# Patient Record
Sex: FEMALE | Race: WHITE | Employment: OTHER | URBAN - METROPOLITAN AREA
[De-identification: names, ages, dates, MRNs, and addresses within clinical notes are randomized per-mention and may not be internally consistent; named-entity substitution may affect disease eponyms.]

---

## 2017-03-16 ENCOUNTER — OFFICE VISIT (OUTPATIENT)
Dept: ORTHOPEDIC SURGERY | Age: 57
End: 2017-03-16

## 2017-03-16 VITALS
BODY MASS INDEX: 32.67 KG/M2 | DIASTOLIC BLOOD PRESSURE: 78 MMHG | WEIGHT: 162.04 LBS | SYSTOLIC BLOOD PRESSURE: 130 MMHG | HEART RATE: 78 BPM | HEIGHT: 59 IN

## 2017-03-16 DIAGNOSIS — M25.572 LEFT ANKLE PAIN, UNSPECIFIED CHRONICITY: Primary | ICD-10-CM

## 2017-03-16 DIAGNOSIS — M25.571 RIGHT ANKLE PAIN, UNSPECIFIED CHRONICITY: ICD-10-CM

## 2017-03-16 PROCEDURE — 73610 X-RAY EXAM OF ANKLE: CPT | Performed by: ORTHOPAEDIC SURGERY

## 2017-03-16 PROCEDURE — L3040 FT ARCH SUPRT PREMOLD LONGIT: HCPCS | Performed by: ORTHOPAEDIC SURGERY

## 2017-03-16 PROCEDURE — 3017F COLORECTAL CA SCREEN DOC REV: CPT | Performed by: ORTHOPAEDIC SURGERY

## 2017-03-16 PROCEDURE — 3014F SCREEN MAMMO DOC REV: CPT | Performed by: ORTHOPAEDIC SURGERY

## 2017-03-16 PROCEDURE — G8427 DOCREV CUR MEDS BY ELIG CLIN: HCPCS | Performed by: ORTHOPAEDIC SURGERY

## 2017-03-16 PROCEDURE — 1036F TOBACCO NON-USER: CPT | Performed by: ORTHOPAEDIC SURGERY

## 2017-03-16 PROCEDURE — G8484 FLU IMMUNIZE NO ADMIN: HCPCS | Performed by: ORTHOPAEDIC SURGERY

## 2017-03-16 PROCEDURE — G8417 CALC BMI ABV UP PARAM F/U: HCPCS | Performed by: ORTHOPAEDIC SURGERY

## 2017-03-16 PROCEDURE — 99214 OFFICE O/P EST MOD 30 MIN: CPT | Performed by: ORTHOPAEDIC SURGERY

## 2017-03-16 RX ORDER — BUDESONIDE AND FORMOTEROL FUMARATE DIHYDRATE 160; 4.5 UG/1; UG/1
AEROSOL RESPIRATORY (INHALATION)
Status: ON HOLD | COMMUNITY
Start: 2017-02-16 | End: 2020-04-22 | Stop reason: CLARIF

## 2017-03-16 RX ORDER — ARIPIPRAZOLE 5 MG/1
5 TABLET ORAL
Status: ON HOLD | COMMUNITY
Start: 2011-01-27 | End: 2020-04-22 | Stop reason: CLARIF

## 2017-03-16 RX ORDER — CLOMIPRAMINE HYDROCHLORIDE 75 MG/1
150 CAPSULE ORAL NIGHTLY
COMMUNITY
Start: 2017-02-14

## 2017-03-16 RX ORDER — FLUTICASONE PROPIONATE 50 MCG
SPRAY, SUSPENSION (ML) NASAL
Status: ON HOLD | COMMUNITY
Start: 2017-01-19 | End: 2020-04-23 | Stop reason: CLARIF

## 2017-03-16 RX ORDER — MONTELUKAST SODIUM 10 MG/1
10 TABLET ORAL NIGHTLY
COMMUNITY
Start: 2017-02-15 | End: 2022-01-18

## 2017-03-16 RX ORDER — AZITHROMYCIN 250 MG/1
TABLET, FILM COATED ORAL
Status: ON HOLD | COMMUNITY
Start: 2017-01-04 | End: 2020-04-22 | Stop reason: CLARIF

## 2017-07-21 ENCOUNTER — HOSPITAL ENCOUNTER (OUTPATIENT)
Dept: MAMMOGRAPHY | Age: 57
Discharge: HOME | End: 2017-07-21
Attending: FAMILY MEDICINE | Admitting: FAMILY MEDICINE

## 2017-07-21 DIAGNOSIS — Z12.31 ENCOUNTER FOR SCREENING MAMMOGRAM FOR BREAST CANCER: ICD-10-CM

## 2018-01-26 ENCOUNTER — HOSPITAL ENCOUNTER (OUTPATIENT)
Dept: OTHER | Age: 58
Discharge: HOME | End: 2018-01-26
Attending: FAMILY MEDICINE | Admitting: FAMILY MEDICINE

## 2018-01-26 LAB
ALBUMIN SERPL-MCNC: 4.1 G/DL (ref 3.4–5)
ANION GAP SERPL CALCULATED.3IONS-SCNC: 11 MMOL/L (ref 3–16)
BUN BLDV-MCNC: 11 MG/DL (ref 7–20)
CALCIUM SERPL-MCNC: 9.3 MG/DL (ref 8.3–10.6)
CHLORIDE BLD-SCNC: 96 MMOL/L (ref 99–110)
CO2: 29 MMOL/L (ref 21–32)
CREAT SERPL-MCNC: 0.9 MG/DL (ref 0.6–1.1)
GFR AFRICAN AMERICAN: >60
GFR NON-AFRICAN AMERICAN: >60
GLUCOSE BLD-MCNC: 104 MG/DL (ref 70–99)
PHOSPHORUS: 3 MG/DL (ref 2.5–4.9)
POTASSIUM SERPL-SCNC: 4.5 MMOL/L (ref 3.5–5.1)
SODIUM BLD-SCNC: 136 MMOL/L (ref 136–145)

## 2018-07-17 ENCOUNTER — HOSPITAL ENCOUNTER (OUTPATIENT)
Dept: MAMMOGRAPHY | Age: 58
Discharge: HOME | End: 2018-07-17
Payer: MEDICARE

## 2018-07-17 DIAGNOSIS — Z12.31 ENCOUNTER FOR SCREENING MAMMOGRAM FOR BREAST CANCER: ICD-10-CM

## 2018-07-17 PROCEDURE — 77067 SCR MAMMO BI INCL CAD: CPT

## 2018-07-25 ENCOUNTER — HOSPITAL ENCOUNTER (OUTPATIENT)
Dept: MAMMOGRAPHY | Age: 58
Discharge: HOME | End: 2018-07-25
Payer: MEDICARE

## 2018-07-25 ENCOUNTER — HOSPITAL ENCOUNTER (OUTPATIENT)
Dept: ULTRASOUND IMAGING | Age: 58
Discharge: HOME | End: 2018-07-25
Payer: MEDICARE

## 2018-07-25 DIAGNOSIS — R92.8 ABNORMAL MAMMOGRAM: ICD-10-CM

## 2018-07-25 PROCEDURE — 76642 ULTRASOUND BREAST LIMITED: CPT

## 2018-07-25 PROCEDURE — 77065 DX MAMMO INCL CAD UNI: CPT

## 2019-01-15 ENCOUNTER — HOSPITAL ENCOUNTER (OUTPATIENT)
Dept: MAMMOGRAPHY | Age: 59
Discharge: HOME | End: 2019-01-15
Payer: MEDICARE

## 2019-01-15 DIAGNOSIS — Z09 FOLLOW-UP EXAM, 3-6 MONTHS SINCE PREVIOUS EXAM: ICD-10-CM

## 2019-01-15 PROCEDURE — G0279 TOMOSYNTHESIS, MAMMO: HCPCS

## 2020-04-22 ENCOUNTER — APPOINTMENT (OUTPATIENT)
Dept: CT IMAGING | Age: 60
End: 2020-04-22
Payer: MEDICARE

## 2020-04-22 ENCOUNTER — HOSPITAL ENCOUNTER (INPATIENT)
Age: 60
LOS: 6 days | Discharge: HOME HEALTH | DRG: 177 | End: 2020-04-28
Attending: INTERNAL MEDICINE | Admitting: INTERNAL MEDICINE
Payer: MEDICARE

## 2020-04-22 ENCOUNTER — HOSPITAL ENCOUNTER (EMERGENCY)
Age: 60
Discharge: ANOTHER HOSPITAL | End: 2020-04-22
Attending: EMERGENCY MEDICINE
Payer: MEDICARE

## 2020-04-22 ENCOUNTER — APPOINTMENT (OUTPATIENT)
Dept: GENERAL RADIOLOGY | Age: 60
End: 2020-04-22
Payer: MEDICARE

## 2020-04-22 VITALS
OXYGEN SATURATION: 99 % | WEIGHT: 150 LBS | BODY MASS INDEX: 29.29 KG/M2 | SYSTOLIC BLOOD PRESSURE: 103 MMHG | RESPIRATION RATE: 29 BRPM | HEART RATE: 97 BPM | TEMPERATURE: 98.4 F | DIASTOLIC BLOOD PRESSURE: 71 MMHG

## 2020-04-22 PROBLEM — R56.9 NEW ONSET SEIZURE (HCC): Status: ACTIVE | Noted: 2020-04-22

## 2020-04-22 LAB
A/G RATIO: 1 (ref 1.1–2.2)
ALBUMIN SERPL-MCNC: 4 G/DL (ref 3.4–5)
ALP BLD-CCNC: 169 U/L (ref 40–129)
ALT SERPL-CCNC: 24 U/L (ref 10–40)
ANION GAP SERPL CALCULATED.3IONS-SCNC: 13 MMOL/L (ref 3–16)
AST SERPL-CCNC: 25 U/L (ref 15–37)
BASE EXCESS VENOUS: 4.6 MMOL/L (ref -3–3)
BASOPHILS ABSOLUTE: 0 K/UL (ref 0–0.2)
BASOPHILS RELATIVE PERCENT: 0.5 %
BILIRUB SERPL-MCNC: <0.2 MG/DL (ref 0–1)
BILIRUBIN URINE: NEGATIVE
BLOOD, URINE: NEGATIVE
BUN BLDV-MCNC: 16 MG/DL (ref 7–20)
C-REACTIVE PROTEIN: 28.3 MG/L (ref 0–5.1)
CALCIUM SERPL-MCNC: 8.8 MG/DL (ref 8.3–10.6)
CARBOXYHEMOGLOBIN: 5.6 % (ref 0–1.5)
CHLORIDE BLD-SCNC: 92 MMOL/L (ref 99–110)
CLARITY: CLEAR
CO2: 28 MMOL/L (ref 21–32)
COLOR: YELLOW
CREAT SERPL-MCNC: 1.1 MG/DL (ref 0.6–1.2)
EKG ATRIAL RATE: 86 BPM
EKG DIAGNOSIS: NORMAL
EKG P AXIS: 68 DEGREES
EKG P-R INTERVAL: 170 MS
EKG Q-T INTERVAL: 384 MS
EKG QRS DURATION: 102 MS
EKG QTC CALCULATION (BAZETT): 459 MS
EKG R AXIS: -19 DEGREES
EKG T AXIS: 107 DEGREES
EKG VENTRICULAR RATE: 86 BPM
EOSINOPHILS ABSOLUTE: 0.1 K/UL (ref 0–0.6)
EOSINOPHILS RELATIVE PERCENT: 1 %
GFR AFRICAN AMERICAN: >60
GFR NON-AFRICAN AMERICAN: 51
GLOBULIN: 4 G/DL
GLUCOSE BLD-MCNC: 96 MG/DL (ref 70–99)
GLUCOSE URINE: NEGATIVE MG/DL
HCO3 VENOUS: 31.4 MMOL/L (ref 23–29)
HCT VFR BLD CALC: 40 % (ref 36–48)
HEMOGLOBIN: 13 G/DL (ref 12–16)
KETONES, URINE: NEGATIVE MG/DL
LACTATE DEHYDROGENASE: 245 U/L (ref 100–190)
LACTIC ACID: 1.6 MMOL/L (ref 0.4–2)
LEUKOCYTE ESTERASE, URINE: NEGATIVE
LYMPHOCYTES ABSOLUTE: 1.4 K/UL (ref 1–5.1)
LYMPHOCYTES RELATIVE PERCENT: 15.6 %
MAGNESIUM: 2.3 MG/DL (ref 1.8–2.4)
MCH RBC QN AUTO: 31.6 PG (ref 26–34)
MCHC RBC AUTO-ENTMCNC: 32.5 G/DL (ref 31–36)
MCV RBC AUTO: 97.2 FL (ref 80–100)
METHEMOGLOBIN VENOUS: 0.3 %
MICROSCOPIC EXAMINATION: NORMAL
MONOCYTES ABSOLUTE: 0.4 K/UL (ref 0–1.3)
MONOCYTES RELATIVE PERCENT: 4.5 %
NEUTROPHILS ABSOLUTE: 6.8 K/UL (ref 1.7–7.7)
NEUTROPHILS RELATIVE PERCENT: 78.4 %
NITRITE, URINE: NEGATIVE
O2 CONTENT, VEN: 9 VOL %
O2 SAT, VEN: 45 %
O2 THERAPY: ABNORMAL
PCO2, VEN: 55.8 MMHG (ref 40–50)
PDW BLD-RTO: 13.3 % (ref 12.4–15.4)
PH UA: 7.5 (ref 5–8)
PH VENOUS: 7.37 (ref 7.35–7.45)
PLATELET # BLD: 201 K/UL (ref 135–450)
PMV BLD AUTO: 8.3 FL (ref 5–10.5)
PO2, VEN: 26.2 MMHG (ref 25–40)
POTASSIUM SERPL-SCNC: 4.2 MMOL/L (ref 3.5–5.1)
PRO-BNP: 355 PG/ML (ref 0–124)
PROCALCITONIN: 0.08 NG/ML (ref 0–0.15)
PROTEIN UA: NEGATIVE MG/DL
RAPID INFLUENZA  B AGN: NEGATIVE
RAPID INFLUENZA A AGN: NEGATIVE
RBC # BLD: 4.11 M/UL (ref 4–5.2)
SODIUM BLD-SCNC: 133 MMOL/L (ref 136–145)
SPECIFIC GRAVITY UA: 1.01 (ref 1–1.03)
TCO2 CALC VENOUS: 33 MMOL/L
TOTAL PROTEIN: 8 G/DL (ref 6.4–8.2)
TROPONIN: <0.01 NG/ML
URINE TYPE: NORMAL
UROBILINOGEN, URINE: 0.2 E.U./DL
WBC # BLD: 8.7 K/UL (ref 4–11)

## 2020-04-22 PROCEDURE — 2580000003 HC RX 258: Performed by: EMERGENCY MEDICINE

## 2020-04-22 PROCEDURE — 6360000002 HC RX W HCPCS: Performed by: EMERGENCY MEDICINE

## 2020-04-22 PROCEDURE — 6370000000 HC RX 637 (ALT 250 FOR IP): Performed by: INTERNAL MEDICINE

## 2020-04-22 PROCEDURE — 70450 CT HEAD/BRAIN W/O DYE: CPT

## 2020-04-22 PROCEDURE — 96366 THER/PROPH/DIAG IV INF ADDON: CPT

## 2020-04-22 PROCEDURE — U0002 COVID-19 LAB TEST NON-CDC: HCPCS

## 2020-04-22 PROCEDURE — 94664 DEMO&/EVAL PT USE INHALER: CPT

## 2020-04-22 PROCEDURE — 71045 X-RAY EXAM CHEST 1 VIEW: CPT

## 2020-04-22 PROCEDURE — 83615 LACTATE (LD) (LDH) ENZYME: CPT

## 2020-04-22 PROCEDURE — 96375 TX/PRO/DX INJ NEW DRUG ADDON: CPT

## 2020-04-22 PROCEDURE — 84145 PROCALCITONIN (PCT): CPT

## 2020-04-22 PROCEDURE — 87804 INFLUENZA ASSAY W/OPTIC: CPT

## 2020-04-22 PROCEDURE — 93010 ELECTROCARDIOGRAM REPORT: CPT | Performed by: INTERNAL MEDICINE

## 2020-04-22 PROCEDURE — 83605 ASSAY OF LACTIC ACID: CPT

## 2020-04-22 PROCEDURE — 84484 ASSAY OF TROPONIN QUANT: CPT

## 2020-04-22 PROCEDURE — 81003 URINALYSIS AUTO W/O SCOPE: CPT

## 2020-04-22 PROCEDURE — 82803 BLOOD GASES ANY COMBINATION: CPT

## 2020-04-22 PROCEDURE — 93005 ELECTROCARDIOGRAM TRACING: CPT | Performed by: EMERGENCY MEDICINE

## 2020-04-22 PROCEDURE — 85025 COMPLETE CBC W/AUTO DIFF WBC: CPT

## 2020-04-22 PROCEDURE — 6360000002 HC RX W HCPCS: Performed by: INTERNAL MEDICINE

## 2020-04-22 PROCEDURE — 6370000000 HC RX 637 (ALT 250 FOR IP): Performed by: EMERGENCY MEDICINE

## 2020-04-22 PROCEDURE — 83735 ASSAY OF MAGNESIUM: CPT

## 2020-04-22 PROCEDURE — 6360000004 HC RX CONTRAST MEDICATION: Performed by: EMERGENCY MEDICINE

## 2020-04-22 PROCEDURE — 87040 BLOOD CULTURE FOR BACTERIA: CPT

## 2020-04-22 PROCEDURE — 99285 EMERGENCY DEPT VISIT HI MDM: CPT

## 2020-04-22 PROCEDURE — 71260 CT THORAX DX C+: CPT

## 2020-04-22 PROCEDURE — 86140 C-REACTIVE PROTEIN: CPT

## 2020-04-22 PROCEDURE — 2580000003 HC RX 258: Performed by: INTERNAL MEDICINE

## 2020-04-22 PROCEDURE — 80053 COMPREHEN METABOLIC PANEL: CPT

## 2020-04-22 PROCEDURE — 2060000000 HC ICU INTERMEDIATE R&B

## 2020-04-22 PROCEDURE — 83880 ASSAY OF NATRIURETIC PEPTIDE: CPT

## 2020-04-22 PROCEDURE — 96365 THER/PROPH/DIAG IV INF INIT: CPT

## 2020-04-22 PROCEDURE — 96367 TX/PROPH/DG ADDL SEQ IV INF: CPT

## 2020-04-22 RX ORDER — LOSARTAN POTASSIUM 25 MG/1
50 TABLET ORAL DAILY
Status: ON HOLD | COMMUNITY
End: 2020-04-28 | Stop reason: HOSPADM

## 2020-04-22 RX ORDER — POLYETHYLENE GLYCOL 3350 17 G/17G
17 POWDER, FOR SOLUTION ORAL DAILY PRN
Status: DISCONTINUED | OUTPATIENT
Start: 2020-04-22 | End: 2020-04-28 | Stop reason: HOSPADM

## 2020-04-22 RX ORDER — MONTELUKAST SODIUM 10 MG/1
10 TABLET ORAL NIGHTLY
Status: DISCONTINUED | OUTPATIENT
Start: 2020-04-22 | End: 2020-04-28 | Stop reason: HOSPADM

## 2020-04-22 RX ORDER — ACETAMINOPHEN 650 MG/1
650 SUPPOSITORY RECTAL EVERY 6 HOURS PRN
Status: DISCONTINUED | OUTPATIENT
Start: 2020-04-22 | End: 2020-04-28 | Stop reason: HOSPADM

## 2020-04-22 RX ORDER — PROMETHAZINE HYDROCHLORIDE 25 MG/1
12.5 TABLET ORAL EVERY 6 HOURS PRN
Status: DISCONTINUED | OUTPATIENT
Start: 2020-04-22 | End: 2020-04-28 | Stop reason: HOSPADM

## 2020-04-22 RX ORDER — 0.9 % SODIUM CHLORIDE 0.9 %
500 INTRAVENOUS SOLUTION INTRAVENOUS ONCE
Status: COMPLETED | OUTPATIENT
Start: 2020-04-22 | End: 2020-04-22

## 2020-04-22 RX ORDER — PANTOPRAZOLE SODIUM 40 MG/1
40 TABLET, DELAYED RELEASE ORAL
Status: DISCONTINUED | OUTPATIENT
Start: 2020-04-23 | End: 2020-04-28 | Stop reason: HOSPADM

## 2020-04-22 RX ORDER — ACETAMINOPHEN 325 MG/1
650 TABLET ORAL EVERY 6 HOURS PRN
Status: DISCONTINUED | OUTPATIENT
Start: 2020-04-22 | End: 2020-04-28 | Stop reason: HOSPADM

## 2020-04-22 RX ORDER — ONDANSETRON 2 MG/ML
4 INJECTION INTRAMUSCULAR; INTRAVENOUS EVERY 6 HOURS PRN
Status: DISCONTINUED | OUTPATIENT
Start: 2020-04-22 | End: 2020-04-28 | Stop reason: HOSPADM

## 2020-04-22 RX ORDER — BUPROPION HYDROCHLORIDE 100 MG/1
100 TABLET ORAL 2 TIMES DAILY
Status: ON HOLD | COMMUNITY
End: 2020-04-22 | Stop reason: CLARIF

## 2020-04-22 RX ORDER — SODIUM CHLORIDE 0.9 % (FLUSH) 0.9 %
10 SYRINGE (ML) INJECTION PRN
Status: DISCONTINUED | OUTPATIENT
Start: 2020-04-22 | End: 2020-04-28 | Stop reason: HOSPADM

## 2020-04-22 RX ORDER — ISOSORBIDE MONONITRATE 30 MG/1
30 TABLET, EXTENDED RELEASE ORAL DAILY
Status: ON HOLD | COMMUNITY
End: 2020-04-28 | Stop reason: HOSPADM

## 2020-04-22 RX ORDER — FLUTICASONE PROPIONATE 50 MCG
1 SPRAY, SUSPENSION (ML) NASAL DAILY
Status: DISCONTINUED | OUTPATIENT
Start: 2020-04-22 | End: 2020-04-28 | Stop reason: HOSPADM

## 2020-04-22 RX ORDER — FUROSEMIDE 10 MG/ML
20 INJECTION INTRAMUSCULAR; INTRAVENOUS ONCE
Status: COMPLETED | OUTPATIENT
Start: 2020-04-22 | End: 2020-04-22

## 2020-04-22 RX ORDER — ARIPIPRAZOLE 2 MG/1
5 TABLET ORAL DAILY
Status: DISCONTINUED | OUTPATIENT
Start: 2020-04-23 | End: 2020-04-28 | Stop reason: HOSPADM

## 2020-04-22 RX ORDER — DIPHENHYDRAMINE HYDROCHLORIDE 50 MG/ML
12.5 INJECTION INTRAMUSCULAR; INTRAVENOUS ONCE
Status: COMPLETED | OUTPATIENT
Start: 2020-04-22 | End: 2020-04-22

## 2020-04-22 RX ORDER — CARVEDILOL 3.12 MG/1
3.12 TABLET ORAL 2 TIMES DAILY WITH MEALS
Status: DISCONTINUED | OUTPATIENT
Start: 2020-04-22 | End: 2020-04-28 | Stop reason: HOSPADM

## 2020-04-22 RX ORDER — LORAZEPAM 2 MG/ML
0.5 INJECTION INTRAMUSCULAR ONCE
Status: COMPLETED | OUTPATIENT
Start: 2020-04-22 | End: 2020-04-22

## 2020-04-22 RX ORDER — ALBUTEROL SULFATE 2.5 MG/3ML
2.5 SOLUTION RESPIRATORY (INHALATION) EVERY 6 HOURS PRN
Status: DISCONTINUED | OUTPATIENT
Start: 2020-04-22 | End: 2020-04-22

## 2020-04-22 RX ORDER — SODIUM CHLORIDE 0.9 % (FLUSH) 0.9 %
10 SYRINGE (ML) INJECTION EVERY 12 HOURS SCHEDULED
Status: DISCONTINUED | OUTPATIENT
Start: 2020-04-22 | End: 2020-04-28 | Stop reason: HOSPADM

## 2020-04-22 RX ORDER — ONDANSETRON 2 MG/ML
4 INJECTION INTRAMUSCULAR; INTRAVENOUS ONCE
Status: COMPLETED | OUTPATIENT
Start: 2020-04-22 | End: 2020-04-22

## 2020-04-22 RX ORDER — BUDESONIDE AND FORMOTEROL FUMARATE DIHYDRATE 160; 4.5 UG/1; UG/1
2 AEROSOL RESPIRATORY (INHALATION) 2 TIMES DAILY
Status: DISCONTINUED | OUTPATIENT
Start: 2020-04-22 | End: 2020-04-23 | Stop reason: ALTCHOICE

## 2020-04-22 RX ORDER — BUPROPION HYDROCHLORIDE 100 MG/1
200 TABLET, EXTENDED RELEASE ORAL DAILY
Status: DISCONTINUED | OUTPATIENT
Start: 2020-04-23 | End: 2020-04-28 | Stop reason: HOSPADM

## 2020-04-22 RX ORDER — SPIRONOLACTONE 25 MG/1
25 TABLET ORAL DAILY
Status: ON HOLD | COMMUNITY
End: 2020-04-28 | Stop reason: HOSPADM

## 2020-04-22 RX ORDER — BUPROPION HYDROCHLORIDE 200 MG/1
200 TABLET, EXTENDED RELEASE ORAL DAILY
COMMUNITY

## 2020-04-22 RX ORDER — 0.9 % SODIUM CHLORIDE 0.9 %
500 INTRAVENOUS SOLUTION INTRAVENOUS ONCE
Status: DISCONTINUED | OUTPATIENT
Start: 2020-04-22 | End: 2020-04-22

## 2020-04-22 RX ORDER — BUDESONIDE AND FORMOTEROL FUMARATE DIHYDRATE 160; 4.5 UG/1; UG/1
2 AEROSOL RESPIRATORY (INHALATION) 2 TIMES DAILY
Status: DISCONTINUED | OUTPATIENT
Start: 2020-04-22 | End: 2020-04-22 | Stop reason: SDUPTHER

## 2020-04-22 RX ORDER — FUROSEMIDE 40 MG/1
40 TABLET ORAL 2 TIMES DAILY
COMMUNITY

## 2020-04-22 RX ORDER — ACETAMINOPHEN 325 MG/1
650 TABLET ORAL ONCE
Status: COMPLETED | OUTPATIENT
Start: 2020-04-22 | End: 2020-04-22

## 2020-04-22 RX ORDER — ALBUTEROL SULFATE 90 UG/1
2 AEROSOL, METERED RESPIRATORY (INHALATION) EVERY 6 HOURS PRN
Status: DISCONTINUED | OUTPATIENT
Start: 2020-04-22 | End: 2020-04-28 | Stop reason: HOSPADM

## 2020-04-22 RX ORDER — METOCLOPRAMIDE HYDROCHLORIDE 5 MG/ML
10 INJECTION INTRAMUSCULAR; INTRAVENOUS ONCE
Status: COMPLETED | OUTPATIENT
Start: 2020-04-22 | End: 2020-04-22

## 2020-04-22 RX ORDER — ATORVASTATIN CALCIUM 10 MG/1
10 TABLET, FILM COATED ORAL NIGHTLY
Status: DISCONTINUED | OUTPATIENT
Start: 2020-04-22 | End: 2020-04-25

## 2020-04-22 RX ADMIN — DIPHENHYDRAMINE HYDROCHLORIDE 12.5 MG: 50 INJECTION, SOLUTION INTRAMUSCULAR; INTRAVENOUS at 14:59

## 2020-04-22 RX ADMIN — CEFTRIAXONE SODIUM 1 G: 1 INJECTION, POWDER, FOR SOLUTION INTRAMUSCULAR; INTRAVENOUS at 14:57

## 2020-04-22 RX ADMIN — FLUTICASONE PROPIONATE 1 SPRAY: 50 SPRAY, METERED NASAL at 23:24

## 2020-04-22 RX ADMIN — IOPAMIDOL 75 ML: 755 INJECTION, SOLUTION INTRAVENOUS at 13:06

## 2020-04-22 RX ADMIN — SODIUM CHLORIDE 500 ML: 9 INJECTION, SOLUTION INTRAVENOUS at 11:51

## 2020-04-22 RX ADMIN — AZITHROMYCIN DIHYDRATE 500 MG: 500 INJECTION, POWDER, LYOPHILIZED, FOR SOLUTION INTRAVENOUS at 16:05

## 2020-04-22 RX ADMIN — ATORVASTATIN CALCIUM 10 MG: 10 TABLET, FILM COATED ORAL at 23:23

## 2020-04-22 RX ADMIN — LORAZEPAM 0.5 MG: 2 INJECTION INTRAMUSCULAR; INTRAVENOUS at 14:57

## 2020-04-22 RX ADMIN — MONTELUKAST 10 MG: 10 TABLET, FILM COATED ORAL at 23:23

## 2020-04-22 RX ADMIN — FUROSEMIDE 20 MG: 10 INJECTION, SOLUTION INTRAVENOUS at 15:04

## 2020-04-22 RX ADMIN — Medication 10 ML: at 23:24

## 2020-04-22 RX ADMIN — ACETAMINOPHEN 650 MG: 325 TABLET ORAL at 14:54

## 2020-04-22 RX ADMIN — ONDANSETRON HYDROCHLORIDE 4 MG: 2 INJECTION, SOLUTION INTRAMUSCULAR; INTRAVENOUS at 13:51

## 2020-04-22 RX ADMIN — METOCLOPRAMIDE 10 MG: 5 INJECTION, SOLUTION INTRAMUSCULAR; INTRAVENOUS at 15:01

## 2020-04-22 RX ADMIN — ENOXAPARIN SODIUM 40 MG: 40 INJECTION SUBCUTANEOUS at 23:23

## 2020-04-22 ASSESSMENT — PAIN SCALES - GENERAL
PAINLEVEL_OUTOF10: 0
PAINLEVEL_OUTOF10: 8
PAINLEVEL_OUTOF10: 2

## 2020-04-22 ASSESSMENT — ENCOUNTER SYMPTOMS
SHORTNESS OF BREATH: 1
BACK PAIN: 0
SORE THROAT: 1
COLOR CHANGE: 0
VOMITING: 1
NAUSEA: 1
ABDOMINAL PAIN: 0
STRIDOR: 0
COUGH: 1

## 2020-04-22 ASSESSMENT — PAIN DESCRIPTION - LOCATION: LOCATION: THROAT

## 2020-04-22 NOTE — ED PROVIDER NOTES
Range    Rapid Influenza A Ag Negative Negative    Rapid Influenza B Ag Negative Negative   CBC auto differential   Result Value Ref Range    WBC 8.7 4.0 - 11.0 K/uL    RBC 4.11 4.00 - 5.20 M/uL    Hemoglobin 13.0 12.0 - 16.0 g/dL    Hematocrit 40.0 36.0 - 48.0 %    MCV 97.2 80.0 - 100.0 fL    MCH 31.6 26.0 - 34.0 pg    MCHC 32.5 31.0 - 36.0 g/dL    RDW 13.3 12.4 - 15.4 %    Platelets 987 424 - 948 K/uL    MPV 8.3 5.0 - 10.5 fL    Neutrophils % 78.4 %    Lymphocytes % 15.6 %    Monocytes % 4.5 %    Eosinophils % 1.0 %    Basophils % 0.5 %    Neutrophils Absolute 6.8 1.7 - 7.7 K/uL    Lymphocytes Absolute 1.4 1.0 - 5.1 K/uL    Monocytes Absolute 0.4 0.0 - 1.3 K/uL    Eosinophils Absolute 0.1 0.0 - 0.6 K/uL    Basophils Absolute 0.0 0.0 - 0.2 K/uL   Comprehensive Metabolic Panel   Result Value Ref Range    Sodium 133 (L) 136 - 145 mmol/L    Potassium 4.2 3.5 - 5.1 mmol/L    Chloride 92 (L) 99 - 110 mmol/L    CO2 28 21 - 32 mmol/L    Anion Gap 13 3 - 16    Glucose 96 70 - 99 mg/dL    BUN 16 7 - 20 mg/dL    CREATININE 1.1 0.6 - 1.2 mg/dL    GFR Non- 51 (A) >60    GFR African American >60 >60    Calcium 8.8 8.3 - 10.6 mg/dL    Total Protein 8.0 6.4 - 8.2 g/dL    Alb 4.0 3.4 - 5.0 g/dL    Albumin/Globulin Ratio 1.0 (L) 1.1 - 2.2    Total Bilirubin <0.2 0.0 - 1.0 mg/dL    Alkaline Phosphatase 169 (H) 40 - 129 U/L    ALT 24 10 - 40 U/L    AST 25 15 - 37 U/L    Globulin 4.0 g/dL   Troponin   Result Value Ref Range    Troponin <0.01 <0.01 ng/mL   Magnesium   Result Value Ref Range    Magnesium 2.30 1.80 - 2.40 mg/dL   Urinalysis, reflex to microscopic   Result Value Ref Range    Color, UA Yellow Straw/Yellow    Clarity, UA Clear Clear    Glucose, Ur Negative Negative mg/dL    Bilirubin Urine Negative Negative    Ketones, Urine Negative Negative mg/dL    Specific Gravity, UA 1.015 1.005 - 1.030    Blood, Urine Negative Negative    pH, UA 7.5 5.0 - 8.0    Protein, UA Negative Negative mg/dL    Urobilinogen,

## 2020-04-22 NOTE — H&P
04/22/20  1100   TROPONINI <0.01       Urinalysis:      Lab Results   Component Value Date    NITRU Negative 04/22/2020    WBCUA 0-3 01/05/2011    BACTERIA 2+ 01/05/2011    RBCUA 0-2 01/05/2011    BLOODU Negative 04/22/2020    SPECGRAV 1.015 04/22/2020    GLUCOSEU Negative 04/22/2020    GLUCOSEU NEGATIVE 01/05/2011       Radiology:     CXR: I have reviewed the CXR with the following interpretation:   Cardiomegaly with bilateral lung opacities/airspace disease    EKG:  I have reviewed the EKG with the following interpretation:  Sinus rhythm with premature ventricular complexes. ASSESSMENT:    Active Hospital Problems    Diagnosis Date Noted    New onset seizure ([facility] [address] [R56.9] 04/22/2020       PLAN:    Acute hypoxic respiratory failure secondary to multifocal bilateral pneumonia (bacterial vs viral from suspected COVID):  Continue oxygen, as tolerated to keep sats more than 90%  Continue IV antibiotics  Follow cultures    Suspected COVID-19:  CT chest with multifocal bilateral pneumonia  Elevated LDH and CRP  COVID test pending  Droplet plus isolation precautions    Asthma:  Continue inhalers and breathing treatments    New onset seizures:  Patient does not have history of seizures  Was noted to have shakiness, bit her tongue and was incontinent of urine  Returned to baseline within 30 minutes to 1 hour  Neurology consulted    Chronic systolic heart failure/hypertension:  Continue home medication with hold parameters    Diabetes mellitus type 2:  Monitor blood glucose  Carb controlled diet and sliding scale insulin    Mental retardation:  Continue home medication    DVT Prophylaxis: Subcu heparin  Diet: DIET CARB CONTROL; Carb Control: 4 carb choices (60 gms)/meal  Code Status: Full Code    PT/OT Eval Status: Once able    Dispo -anticipate discharge in 50 to 67 hours       Enrique Hodgson MD    Thank you Guillaume Larios for the opportunity to be involved in this patient's care.  If you have any questions or concerns please feel free to contact me at [phone].

## 2020-04-22 NOTE — ED PROVIDER NOTES
[address] ED  EMERGENCY DEPARTMENT ENCOUNTER        Pt Name: Angela Zamora  MRN: [id number]  [place] 1960  Date of evaluation: 4/22/2020  Provider: Donna Wilkinson MD  PCP: [address] [place] [address]       Chief Complaint   Patient presents with    Altered Mental Status     pt was on her way to her job this morning. staff states she started shaking / wet her pants and went unresponsive. slowly came around. seemed dazed. pt is awake and talking at this time. HISTORY OFPRESENT ILLNESS   (Location/Symptom, Timing/Onset, Context/Setting, Quality, Duration, Modifying Factors,Severity)  Note limiting factors. Angela Zamora is a 61 y.o. female presenting today due to concern for altered mental status along with possibility of seizure-like activity prior to arrival.  She was having some reported trouble breathing and a sore throat this morning. At the group home, she reportedly vomited at one point and then suddenly became unresponsive with some jerking movements of her head while sitting without any reported falls or trauma. It took her roughly 30 minutes to 1 hour to return to her normal baseline. She did bite the tip of her tongue along with urinated on herself. No reported history of seizures per her sister who is at bedside and no reported history of syncope. She does have a history of CHF. No reported fever. At the group home, there is no reported coronavirus. At this time, she is currently back to baseline mentally per sister. She does have a history of mental retardation and therefore history is somewhat limited although she is able to answer most questions appropriately. She does complain of a mild headache. She denies any neck or back pain. No chest pain or abdominal pain. She briefly had some pain to her right knee when I was talking to her but this resolved quickly and she denies any pain to the arms or legs at this time.         REVIEW OF process tenderness or muscular tenderness. Trachea: Trachea normal. No tracheal deviation. Cardiovascular:      Rate and Rhythm: Regular rhythm. Tachycardia present. Pulses: Normal pulses. Pulmonary:      Effort: Tachypnea and respiratory distress (mild) present. No bradypnea, accessory muscle usage, prolonged expiration or retractions. She is not intubated. Breath sounds: Normal air entry. No stridor, decreased air movement or transmitted upper airway sounds. Examination of the right-upper field reveals wheezing. Examination of the left-upper field reveals wheezing. Examination of the right-middle field reveals wheezing. Examination of the left-middle field reveals wheezing. Examination of the right-lower field reveals wheezing. Examination of the left-lower field reveals wheezing. Wheezing and rhonchi present. No decreased breath sounds or rales. Chest:      Chest wall: No tenderness. Abdominal:      General: Bowel sounds are normal. There is no distension. Palpations: Abdomen is soft. Abdomen is not rigid. Tenderness: There is no abdominal tenderness. There is no right CVA tenderness, left CVA tenderness, guarding or rebound. Negative signs include Flores's sign and McBurney's sign. Musculoskeletal: Normal range of motion. General: No swelling, tenderness, deformity or signs of injury. Right lower leg: No edema. Left lower leg: No edema. Comments: MSK: Normal range of motion of bilateral shoulders, elbows, wrists, hips, knees, ankles and nontender to palpation of all joints      Skin:     General: Skin is warm and dry. Coloration: Skin is not jaundiced or pale. Findings: No bruising, erythema, lesion or rash. Neurological:      General: No focal deficit present. Mental Status: She is alert. Mental status is at baseline. GCS: GCS eye subscore is 4. GCS verbal subscore is 5. GCS motor subscore is 6.       Cranial Nerves: No cranial nerve

## 2020-04-22 NOTE — ED NOTES
Report given to [facility] at this time. Pt being transferred to [facility] via stretcher at this time. Pt stable upon transport.       Kamari López RN  04/22/20 1108

## 2020-04-23 LAB
A/G RATIO: 1.1 (ref 1.1–2.2)
ALBUMIN SERPL-MCNC: 3.9 G/DL (ref 3.4–5)
ALP BLD-CCNC: 166 U/L (ref 40–129)
ALT SERPL-CCNC: 45 U/L (ref 10–40)
ANION GAP SERPL CALCULATED.3IONS-SCNC: 14 MMOL/L (ref 3–16)
AST SERPL-CCNC: 43 U/L (ref 15–37)
BASOPHILS ABSOLUTE: 0 K/UL (ref 0–0.2)
BASOPHILS RELATIVE PERCENT: 0.2 %
BILIRUB SERPL-MCNC: 0.7 MG/DL (ref 0–1)
BUN BLDV-MCNC: 19 MG/DL (ref 7–20)
CALCIUM SERPL-MCNC: 9.3 MG/DL (ref 8.3–10.6)
CHLORIDE BLD-SCNC: 95 MMOL/L (ref 99–110)
CO2: 27 MMOL/L (ref 21–32)
CREAT SERPL-MCNC: 1.2 MG/DL (ref 0.6–1.2)
EOSINOPHILS ABSOLUTE: 0 K/UL (ref 0–0.6)
EOSINOPHILS RELATIVE PERCENT: 0.2 %
GFR AFRICAN AMERICAN: 55
GFR NON-AFRICAN AMERICAN: 46
GLOBULIN: 3.4 G/DL
GLUCOSE BLD-MCNC: 93 MG/DL (ref 70–99)
HCT VFR BLD CALC: 39.2 % (ref 36–48)
HEMOGLOBIN: 12.8 G/DL (ref 12–16)
LYMPHOCYTES ABSOLUTE: 2.2 K/UL (ref 1–5.1)
LYMPHOCYTES RELATIVE PERCENT: 11.8 %
MCH RBC QN AUTO: 32.7 PG (ref 26–34)
MCHC RBC AUTO-ENTMCNC: 32.7 G/DL (ref 31–36)
MCV RBC AUTO: 99.8 FL (ref 80–100)
MONOCYTES ABSOLUTE: 0.7 K/UL (ref 0–1.3)
MONOCYTES RELATIVE PERCENT: 3.7 %
NEUTROPHILS ABSOLUTE: 15.5 K/UL (ref 1.7–7.7)
NEUTROPHILS RELATIVE PERCENT: 84.1 %
PDW BLD-RTO: 13.8 % (ref 12.4–15.4)
PLATELET # BLD: 161 K/UL (ref 135–450)
PMV BLD AUTO: 8.1 FL (ref 5–10.5)
POTASSIUM REFLEX MAGNESIUM: 4 MMOL/L (ref 3.5–5.1)
RBC # BLD: 3.93 M/UL (ref 4–5.2)
SARS-COV-2, PCR: NOT DETECTED
SODIUM BLD-SCNC: 136 MMOL/L (ref 136–145)
TOTAL PROTEIN: 7.3 G/DL (ref 6.4–8.2)
WBC # BLD: 18.4 K/UL (ref 4–11)

## 2020-04-23 PROCEDURE — 6370000000 HC RX 637 (ALT 250 FOR IP): Performed by: INTERNAL MEDICINE

## 2020-04-23 PROCEDURE — 2060000000 HC ICU INTERMEDIATE R&B

## 2020-04-23 PROCEDURE — 94640 AIRWAY INHALATION TREATMENT: CPT

## 2020-04-23 PROCEDURE — 2580000003 HC RX 258: Performed by: INTERNAL MEDICINE

## 2020-04-23 PROCEDURE — 85025 COMPLETE CBC W/AUTO DIFF WBC: CPT

## 2020-04-23 PROCEDURE — 80053 COMPREHEN METABOLIC PANEL: CPT

## 2020-04-23 PROCEDURE — 6360000002 HC RX W HCPCS: Performed by: INTERNAL MEDICINE

## 2020-04-23 RX ORDER — DIAPER,BRIEF,INFANT-TODD,DISP
EACH MISCELLANEOUS 2 TIMES DAILY PRN
COMMUNITY

## 2020-04-23 RX ORDER — GUAIFENESIN 600 MG/1
1200 TABLET, EXTENDED RELEASE ORAL 2 TIMES DAILY PRN
COMMUNITY
End: 2021-06-28 | Stop reason: CLARIF

## 2020-04-23 RX ORDER — IBUPROFEN 400 MG/1
400 TABLET ORAL EVERY 6 HOURS PRN
Status: ON HOLD | COMMUNITY
End: 2020-04-28 | Stop reason: HOSPADM

## 2020-04-23 RX ORDER — BUDESONIDE AND FORMOTEROL FUMARATE DIHYDRATE 160; 4.5 UG/1; UG/1
2 AEROSOL RESPIRATORY (INHALATION) 2 TIMES DAILY
COMMUNITY
End: 2021-06-28 | Stop reason: CLARIF

## 2020-04-23 RX ORDER — DIPHENHYDRAMINE HCL 25 MG
25 TABLET ORAL EVERY 8 HOURS PRN
COMMUNITY
End: 2021-06-28 | Stop reason: CLARIF

## 2020-04-23 RX ORDER — MAGNESIUM OXIDE 400 MG/1
400 TABLET ORAL DAILY
COMMUNITY

## 2020-04-23 RX ORDER — LORATADINE 10 MG/1
10 TABLET ORAL DAILY PRN
COMMUNITY
End: 2021-08-17

## 2020-04-23 RX ORDER — ACETAMINOPHEN 500 MG
1000 TABLET ORAL EVERY 6 HOURS PRN
COMMUNITY
End: 2021-06-28 | Stop reason: CLARIF

## 2020-04-23 RX ADMIN — ARIPIPRAZOLE 5 MG: 2 TABLET ORAL at 09:06

## 2020-04-23 RX ADMIN — CARVEDILOL 3.12 MG: 3.12 TABLET, FILM COATED ORAL at 16:47

## 2020-04-23 RX ADMIN — ATORVASTATIN CALCIUM 10 MG: 10 TABLET, FILM COATED ORAL at 20:26

## 2020-04-23 RX ADMIN — ACETAMINOPHEN 650 MG: 325 TABLET ORAL at 11:07

## 2020-04-23 RX ADMIN — Medication 10 ML: at 20:26

## 2020-04-23 RX ADMIN — ENOXAPARIN SODIUM 40 MG: 40 INJECTION SUBCUTANEOUS at 08:59

## 2020-04-23 RX ADMIN — BUPROPION HYDROCHLORIDE 200 MG: 100 TABLET, FILM COATED, EXTENDED RELEASE ORAL at 08:59

## 2020-04-23 RX ADMIN — AZITHROMYCIN MONOHYDRATE 250 MG: 500 INJECTION, POWDER, LYOPHILIZED, FOR SOLUTION INTRAVENOUS at 16:38

## 2020-04-23 RX ADMIN — MONTELUKAST 10 MG: 10 TABLET, FILM COATED ORAL at 20:26

## 2020-04-23 RX ADMIN — MOMETASONE FUROATE AND FORMOTEROL FUMARATE DIHYDRATE 2 PUFF: 200; 5 AEROSOL RESPIRATORY (INHALATION) at 20:25

## 2020-04-23 RX ADMIN — PANTOPRAZOLE SODIUM 40 MG: 40 TABLET, DELAYED RELEASE ORAL at 05:13

## 2020-04-23 RX ADMIN — Medication 10 ML: at 09:05

## 2020-04-23 RX ADMIN — FLUTICASONE PROPIONATE 1 SPRAY: 50 SPRAY, METERED NASAL at 08:59

## 2020-04-23 RX ADMIN — MOMETASONE FUROATE AND FORMOTEROL FUMARATE DIHYDRATE 2 PUFF: 200; 5 AEROSOL RESPIRATORY (INHALATION) at 09:34

## 2020-04-23 RX ADMIN — CEFTRIAXONE 1 G: 1 INJECTION, POWDER, FOR SOLUTION INTRAMUSCULAR; INTRAVENOUS at 14:42

## 2020-04-23 RX ADMIN — ACETAMINOPHEN 650 MG: 325 TABLET ORAL at 05:13

## 2020-04-23 ASSESSMENT — PAIN SCALES - GENERAL
PAINLEVEL_OUTOF10: 0
PAINLEVEL_OUTOF10: 3
PAINLEVEL_OUTOF10: 3

## 2020-04-23 NOTE — PROGRESS NOTES
RESPIRATORY THERAPY ASSESSMENT    Name:  Stephen Madrigal Record Number:  [id number]  Age: 61 y.o. Gender: female  : 1960  Today's Date:  2020  Room:  6043    Assessment     Is the patient being admitted for a COPD or Asthma exacerbation? No   (If yes the patient will be seen every 4 hours for the first 24 hours and then reassessed)    Patient Admission Diagnosis - Seizures, pneumonia, r/o COVID      Allergies  Allergies   Allergen Reactions    Penicillins        Minimum Predicted Vital Capacity:     714          Actual Vital Capacity:      Pt wasn't able to understand to take breath in              Pulmonary History:CHF/Pulmonary Edema  Home Oxygen Therapy:  room air  Home Respiratory Therapy:Budesonide/Formoterol , Albuterol  Current Respiratory Therapy:  HHN Albuterol Q6 prn, Symbicort BID           Respiratory Severity Index(RSI)   Patients with orders for inhalation medications, oxygen, or any therapeutic treatment modality will be placed on Respiratory Protocol. They will be assessed with the first treatment and at least every 72 hours thereafter. The following severity scale will be used to determine frequency of treatment intervention.     Smoking History: No Smoking History = 0    Social History  Social History     Tobacco Use    Smoking status: Never Smoker    Smokeless tobacco: Never Used   Substance Use Topics    Alcohol use: No    Drug use: No       Recent Surgical History: None = 0  Past Surgical History  Past Surgical History:   Procedure Laterality Date    CHOLECYSTECTOMY      HYSTERECTOMY         Level of Consciousness: Alert, Follows Commands but Disoriented = 1    Level of Activity: Walking with assistance = 1    Respiratory Pattern: Regular Pattern; RR 8-20 = 0    Breath Sounds: Diminshed bilaterally and/or crackles = 2    Sputum   ,  ,    Cough: Strong, spontaneous, non-productive = 0    Vital Signs   BP 92/70   Pulse 88   Temp 97.8 °F (36.6 °C) (Oral) d. RR > 30 bpm   5. Bronchodilators will be delivered via Metered Dose Inhaler (MDI), HHN, Aerogen to intubated patients on mechanical ventilation. 6. Inhalation medication orders will be delivered and/or substituted as outlined below. Aerosolized Medications Ordering and Administration Guidelines:    1. All Medications will be ordered by a physician, and their frequency and/or modality will be adjusted as defined by the patients Respiratory Severity Index (RSI) score. 2. If the patient does not have documented COPD, consider discontinuing anticholinergics when RSI is less than 9.  3. If the bronchospasm worsens (increased RSI), then the bronchodilator frequency can be increased to a maximum of every 4 hours. If greater than every 4 hours is required, the physician will be contacted. 4. If the bronchospasm improves, the frequency of the bronchodilator can be decreased, based on the patient's RSI, but not less than home treatment regimen frequency. 5. Bronchodilator(s) will be discontinued if patient has a RSI less than 9 and has received no scheduled or as needed treatment for 72  Hrs. Patients Ordered on a Mucolytic Agent:    1. Must always be administered with a bronchodilator. 2. Discontinue if patient experiences worsened bronchospasm, or secretions have lessened to the point that the patient is able to clear them with a cough. Anti-inflammatory and Combination Medications:    1. If the patient lacks prior history of lung disease, is not using inhaled anti-inflammatory medication at home, and lacks wheezing by examination or by history for at least 24 hours, contact physician for possible discontinuation.

## 2020-04-23 NOTE — PROGRESS NOTES
Hospitalist Progress Note      PCP: Ladi Levin    Date of Admission: 4/22/2020    Chief Complaint: Altered mental status    Hospital Course: 60 y.o. female with past medical history of mental retardation, chronic systolic heart failure, hypertension, hyperlipidemia, diabetes mellitus, thyroid disease, menopause presented to [facility] with altered mental status. Admitted for acute hypoxic respiratory failure secondary pneumonia      Subjective: no acute events reported overnight. Overall she seems to be improving. Denies any new concerns. Medications:  Reviewed    Infusion Medications   Scheduled Medications    mometasone-formoterol  2 puff Inhalation BID    ARIPiprazole  5 mg Oral Daily    atorvastatin  10 mg Oral Nightly    carvedilol  3.125 mg Oral BID WC    fluticasone  1 spray Nasal Daily    montelukast  10 mg Oral Nightly    pantoprazole  40 mg Oral QAM AC    sodium chloride flush  10 mL Intravenous 2 times per day    enoxaparin  40 mg Subcutaneous Daily    cefTRIAXone (ROCEPHIN) IV  1 g Intravenous Q24H    azithromycin  250 mg Intravenous Q24H    buPROPion  200 mg Oral Daily     PRN Meds: sodium chloride flush, acetaminophen **OR** acetaminophen, polyethylene glycol, promethazine **OR** ondansetron, albuterol sulfate HFA      Intake/Output Summary (Last 24 hours) at 4/23/2020 1305  Last data filed at 4/23/2020 1101  Gross per 24 hour   Intake 580 ml   Output 650 ml   Net -70 ml       Physical Exam Performed:    BP (!) 111/59   Pulse 100   Temp 97.4 °F (36.3 °C)   Resp 22   Ht 5' (1.524 m)   Wt 146 lb (66.2 kg)   SpO2 90%   BMI 28.51 kg/m²     General appearance:  No apparent distress, appears stated age and cooperative. HEENT:  Normal cephalic, atraumatic without obvious deformity. Pupils equal, round, and reactive to light. Extra ocular muscles intact. Conjunctivae/corneas clear. Neck: Supple, with full range of motion. No jugular venous distention.  Trachea precautions     Acute encephalopathy-improved   -Continue monitor    New onset seizures:  -Neurology following, recommend EEG, MRI of the brain with and without trena     Asthma:  -Continue inhalers and breathing treatments    Chronic systolic heart failure/hypertension:  -Continue home medication with hold parameters     Diabetes mellitus type 2:  -Carb controlled diet and sliding scale insulin     Mental retardation:  -stable    DVT Prophylaxis: Lovenox  Diet: DIET CARB CONTROL; Safety Tray; Safety Tray (Disposables No Utensils)  Code Status: Full Code    PT/OT Eval Status: Pending    Dispo -pending clinical improvement    Eunice Martinez MD

## 2020-04-23 NOTE — CONSULTS
weight 146 lb (66.2 kg), SpO2 95 %, not currently breastfeeding. Per bedside RN:   Patient is alert , oriented to self , knows that she is in the hospital, following commands well, ambulating well, moving all extremities well, some garbled speech, tracks RN around the room. Seems to be at her baseline. Labs  BUN: 19  Creatinine: 1.2  Lactate: 1.6  ALT: 45  AST: 43  WBC: 8.7-->18.4    UA:   Ref. Range 4/22/2020 11:50   Nitrite, Urine Latest Ref Range: Negative  Negative   Leukocyte Esterase, Urine Latest Ref Range: Negative  Negative       Flu negative    COVID-19: Pending      Studies  CT head  No acute intracranial abnormality. EKG: Sinus rhythm     Impression:  1. Transient alteration in awareness  2. Encephalopathy  3. Acute respiratory failure   4. Mental retardation   5. Hypotension      Jess Lancaster is a 61 y.o. female who presented with an episode of transient alteration in awareness followed by  30-60 minutes of encephalopathy in the setting of acute respiratory failure concerning for new onset seizures. Also noted to be hypotensive during admission.      Recommendations:  - Routine EEG when able  - MRI brain w/wo trena when able  - No AED given 1st ever suspected seizure unless EEG or MRI abnormal or she has further events  - Syncope workup is reasonable as well given hypotension      A copy of this note was provided for MD Cornelius Gallegos [address] Neurology  [phone]  Evenings, weekends, and off weeks please discuss neurologist on-call

## 2020-04-24 ENCOUNTER — APPOINTMENT (OUTPATIENT)
Dept: MRI IMAGING | Age: 60
DRG: 177 | End: 2020-04-24
Attending: INTERNAL MEDICINE
Payer: MEDICARE

## 2020-04-24 PROBLEM — J18.9 RLL PNEUMONIA: Status: ACTIVE | Noted: 2020-04-24

## 2020-04-24 PROBLEM — J96.01 ACUTE RESPIRATORY FAILURE WITH HYPOXIA (HCC): Status: ACTIVE | Noted: 2020-04-24

## 2020-04-24 LAB
A/G RATIO: 0.9 (ref 1.1–2.2)
ALBUMIN SERPL-MCNC: 3.3 G/DL (ref 3.4–5)
ALP BLD-CCNC: 154 U/L (ref 40–129)
ALT SERPL-CCNC: 27 U/L (ref 10–40)
ANION GAP SERPL CALCULATED.3IONS-SCNC: 14 MMOL/L (ref 3–16)
AST SERPL-CCNC: 23 U/L (ref 15–37)
BASOPHILS ABSOLUTE: 0 K/UL (ref 0–0.2)
BASOPHILS RELATIVE PERCENT: 0.3 %
BILIRUB SERPL-MCNC: 0.5 MG/DL (ref 0–1)
BUN BLDV-MCNC: 14 MG/DL (ref 7–20)
CALCIUM SERPL-MCNC: 9.4 MG/DL (ref 8.3–10.6)
CHLORIDE BLD-SCNC: 96 MMOL/L (ref 99–110)
CO2: 25 MMOL/L (ref 21–32)
CREAT SERPL-MCNC: 1.2 MG/DL (ref 0.6–1.2)
EOSINOPHILS ABSOLUTE: 0 K/UL (ref 0–0.6)
EOSINOPHILS RELATIVE PERCENT: 0.2 %
GFR AFRICAN AMERICAN: 55
GFR NON-AFRICAN AMERICAN: 46
GLOBULIN: 3.7 G/DL
GLUCOSE BLD-MCNC: 105 MG/DL (ref 70–99)
HCT VFR BLD CALC: 38.2 % (ref 36–48)
HEMOGLOBIN: 12.6 G/DL (ref 12–16)
LYMPHOCYTES ABSOLUTE: 1.6 K/UL (ref 1–5.1)
LYMPHOCYTES RELATIVE PERCENT: 11.3 %
MCH RBC QN AUTO: 32.2 PG (ref 26–34)
MCHC RBC AUTO-ENTMCNC: 32.9 G/DL (ref 31–36)
MCV RBC AUTO: 97.9 FL (ref 80–100)
MONOCYTES ABSOLUTE: 0.6 K/UL (ref 0–1.3)
MONOCYTES RELATIVE PERCENT: 4.2 %
NEUTROPHILS ABSOLUTE: 12 K/UL (ref 1.7–7.7)
NEUTROPHILS RELATIVE PERCENT: 84 %
PDW BLD-RTO: 13.8 % (ref 12.4–15.4)
PLATELET # BLD: 151 K/UL (ref 135–450)
PMV BLD AUTO: 8.4 FL (ref 5–10.5)
POTASSIUM REFLEX MAGNESIUM: 4.2 MMOL/L (ref 3.5–5.1)
RBC # BLD: 3.91 M/UL (ref 4–5.2)
REPORT: NORMAL
RESPIRATORY PANEL PCR: NORMAL
SODIUM BLD-SCNC: 135 MMOL/L (ref 136–145)
TOTAL PROTEIN: 7 G/DL (ref 6.4–8.2)
WBC # BLD: 14.3 K/UL (ref 4–11)

## 2020-04-24 PROCEDURE — 6370000000 HC RX 637 (ALT 250 FOR IP): Performed by: INTERNAL MEDICINE

## 2020-04-24 PROCEDURE — 2500000003 HC RX 250 WO HCPCS: Performed by: STUDENT IN AN ORGANIZED HEALTH CARE EDUCATION/TRAINING PROGRAM

## 2020-04-24 PROCEDURE — 95819 EEG AWAKE AND ASLEEP: CPT

## 2020-04-24 PROCEDURE — 80053 COMPREHEN METABOLIC PANEL: CPT

## 2020-04-24 PROCEDURE — A9579 GAD-BASE MR CONTRAST NOS,1ML: HCPCS | Performed by: NURSE PRACTITIONER

## 2020-04-24 PROCEDURE — 0100U HC RESPIRPTHGN MULT REV TRANS & AMP PRB TECH 21 TRGT: CPT

## 2020-04-24 PROCEDURE — 85025 COMPLETE CBC W/AUTO DIFF WBC: CPT

## 2020-04-24 PROCEDURE — 6370000000 HC RX 637 (ALT 250 FOR IP): Performed by: NURSE PRACTITIONER

## 2020-04-24 PROCEDURE — 94761 N-INVAS EAR/PLS OXIMETRY MLT: CPT

## 2020-04-24 PROCEDURE — 6360000002 HC RX W HCPCS: Performed by: STUDENT IN AN ORGANIZED HEALTH CARE EDUCATION/TRAINING PROGRAM

## 2020-04-24 PROCEDURE — 94640 AIRWAY INHALATION TREATMENT: CPT

## 2020-04-24 PROCEDURE — 2580000003 HC RX 258: Performed by: INTERNAL MEDICINE

## 2020-04-24 PROCEDURE — 6360000004 HC RX CONTRAST MEDICATION: Performed by: NURSE PRACTITIONER

## 2020-04-24 PROCEDURE — 1200000000 HC SEMI PRIVATE

## 2020-04-24 PROCEDURE — 6360000002 HC RX W HCPCS: Performed by: INTERNAL MEDICINE

## 2020-04-24 PROCEDURE — 2580000003 HC RX 258: Performed by: STUDENT IN AN ORGANIZED HEALTH CARE EDUCATION/TRAINING PROGRAM

## 2020-04-24 PROCEDURE — 70553 MRI BRAIN STEM W/O & W/DYE: CPT

## 2020-04-24 RX ORDER — LEVETIRACETAM 500 MG/1
500 TABLET ORAL 2 TIMES DAILY
Status: DISCONTINUED | OUTPATIENT
Start: 2020-04-24 | End: 2020-04-28 | Stop reason: HOSPADM

## 2020-04-24 RX ORDER — LORAZEPAM 2 MG/ML
1 INJECTION INTRAMUSCULAR
Status: COMPLETED | OUTPATIENT
Start: 2020-04-24 | End: 2020-04-24

## 2020-04-24 RX ADMIN — ENOXAPARIN SODIUM 40 MG: 40 INJECTION SUBCUTANEOUS at 08:30

## 2020-04-24 RX ADMIN — GADOTERIDOL 14 ML: 279.3 INJECTION, SOLUTION INTRAVENOUS at 15:44

## 2020-04-24 RX ADMIN — ATORVASTATIN CALCIUM 10 MG: 10 TABLET, FILM COATED ORAL at 20:02

## 2020-04-24 RX ADMIN — BUPROPION HYDROCHLORIDE 200 MG: 100 TABLET, FILM COATED, EXTENDED RELEASE ORAL at 08:30

## 2020-04-24 RX ADMIN — METRONIDAZOLE 500 MG: 500 INJECTION, SOLUTION INTRAVENOUS at 23:21

## 2020-04-24 RX ADMIN — PANTOPRAZOLE SODIUM 40 MG: 40 TABLET, DELAYED RELEASE ORAL at 06:14

## 2020-04-24 RX ADMIN — MOMETASONE FUROATE AND FORMOTEROL FUMARATE DIHYDRATE 2 PUFF: 200; 5 AEROSOL RESPIRATORY (INHALATION) at 19:35

## 2020-04-24 RX ADMIN — ACETAMINOPHEN 650 MG: 325 TABLET ORAL at 08:27

## 2020-04-24 RX ADMIN — CEFTRIAXONE 1 G: 1 INJECTION, POWDER, FOR SOLUTION INTRAMUSCULAR; INTRAVENOUS at 14:38

## 2020-04-24 RX ADMIN — CARVEDILOL 3.12 MG: 3.12 TABLET, FILM COATED ORAL at 18:38

## 2020-04-24 RX ADMIN — MONTELUKAST 10 MG: 10 TABLET, FILM COATED ORAL at 20:02

## 2020-04-24 RX ADMIN — FLUTICASONE PROPIONATE 1 SPRAY: 50 SPRAY, METERED NASAL at 08:30

## 2020-04-24 RX ADMIN — LEVETIRACETAM 500 MG: 500 TABLET ORAL at 20:02

## 2020-04-24 RX ADMIN — ACETAMINOPHEN 650 MG: 325 TABLET ORAL at 00:38

## 2020-04-24 RX ADMIN — LEVETIRACETAM 500 MG: 500 TABLET ORAL at 08:27

## 2020-04-24 RX ADMIN — LORAZEPAM 1 MG: 2 INJECTION, SOLUTION INTRAMUSCULAR; INTRAVENOUS at 14:53

## 2020-04-24 RX ADMIN — MOMETASONE FUROATE AND FORMOTEROL FUMARATE DIHYDRATE 2 PUFF: 200; 5 AEROSOL RESPIRATORY (INHALATION) at 08:35

## 2020-04-24 RX ADMIN — ARIPIPRAZOLE 5 MG: 2 TABLET ORAL at 08:27

## 2020-04-24 RX ADMIN — AZITHROMYCIN MONOHYDRATE 250 MG: 500 INJECTION, POWDER, LYOPHILIZED, FOR SOLUTION INTRAVENOUS at 19:33

## 2020-04-24 RX ADMIN — Medication 10 ML: at 23:28

## 2020-04-24 RX ADMIN — Medication 10 ML: at 08:30

## 2020-04-24 ASSESSMENT — ENCOUNTER SYMPTOMS
WHEEZING: 0
NAUSEA: 0
VOMITING: 0
COUGH: 1

## 2020-04-24 ASSESSMENT — PAIN DESCRIPTION - PROGRESSION
CLINICAL_PROGRESSION: NOT CHANGED
CLINICAL_PROGRESSION: NOT CHANGED

## 2020-04-24 ASSESSMENT — PAIN SCALES - GENERAL
PAINLEVEL_OUTOF10: 0
PAINLEVEL_OUTOF10: 3
PAINLEVEL_OUTOF10: 3
PAINLEVEL_OUTOF10: 0
PAINLEVEL_OUTOF10: 0

## 2020-04-24 ASSESSMENT — PAIN DESCRIPTION - DESCRIPTORS: DESCRIPTORS: ACHING

## 2020-04-24 ASSESSMENT — PAIN DESCRIPTION - ONSET: ONSET: ON-GOING

## 2020-04-24 ASSESSMENT — PAIN DESCRIPTION - PAIN TYPE: TYPE: ACUTE PAIN

## 2020-04-24 ASSESSMENT — PAIN DESCRIPTION - LOCATION: LOCATION: HEAD

## 2020-04-24 ASSESSMENT — PAIN - FUNCTIONAL ASSESSMENT: PAIN_FUNCTIONAL_ASSESSMENT: ACTIVITIES ARE NOT PREVENTED

## 2020-04-24 ASSESSMENT — VISUAL ACUITY: OU: 1

## 2020-04-24 ASSESSMENT — PAIN DESCRIPTION - FREQUENCY: FREQUENCY: CONTINUOUS

## 2020-04-24 NOTE — PROCEDURES
Name: Virgil Painter  MRN: [id number]  : 1960  Interpreting Physician: Devonda Nyhan, MD  Referring Physician: Benitez Hernandez MD  Date of EE/24      Clinical History:  Virgil Painter is a 61 y.o. female with a reported history of stroke with seizure who was referred for EEG. Current Antiepileptic Medications:    levETIRAcetam  500 mg Oral BID    cefTRIAXone (ROCEPHIN) IV  1 g Intravenous Q24H    metroNIDAZOLE  500 mg Intravenous Q8H    mometasone-formoterol  2 puff Inhalation BID    ARIPiprazole  5 mg Oral Daily    atorvastatin  10 mg Oral Nightly    carvedilol  3.125 mg Oral BID WC    fluticasone  1 spray Nasal Daily    montelukast  10 mg Oral Nightly    pantoprazole  40 mg Oral QAM AC    sodium chloride flush  10 mL Intravenous 2 times per day    enoxaparin  40 mg Subcutaneous Daily    azithromycin  250 mg Intravenous Q24H    buPROPion  200 mg Oral Daily       Indication:  seizure    Technical Summary:  20 channels of EEG were recorded in a digital format on a patient who is reported to be awake and drowsy during the recording. The patient was not sleep deprived prior to the EEG. The recording revealed a normal background rhythm in the alpha frequency range. There was prominent left hemisphere slowing. Photic stimulation without clear occipital driving response    During the recording stage II sleep  was not seen. The EKG lead revealed no rhythm abnormalties. EEG Interpretation:   The EEG was abnormal due to the presence of:  Left hemisphere Focal slowing which is consistent with a focal disturbance of cerebral function and an underlying structural lesion should be considered. Clinical correlation is recommended.   The absence of epileptiform discharges on a single EEG does not rule out a diagnosis of  epilepsy or rule out non-convulsive or complex partial status epilepticus as a cause of altered mental status

## 2020-04-24 NOTE — PROGRESS NOTES
HENT:      Head: Normocephalic and atraumatic. Eyes:      General: Vision grossly intact. Conjunctiva/sclera: Conjunctivae normal.   Neck:      Musculoskeletal: Full passive range of motion without pain, normal range of motion and neck supple. Cardiovascular:      Rate and Rhythm: Normal rate and regular rhythm. Pulses: Normal pulses. Heart sounds: Normal heart sounds, S1 normal and S2 normal. No murmur. No friction rub. No gallop. Pulmonary:      Effort: Pulmonary effort is normal. No respiratory distress. Breath sounds: Normal air entry. No stridor. Rales (in anterior upper chest and crackles in b/l LLB) present. No wheezing. Chest:      Chest wall: No tenderness. Abdominal:      General: Bowel sounds are normal. There is no distension. Palpations: Abdomen is soft. Tenderness: There is no abdominal tenderness. Musculoskeletal: Normal range of motion. Right lower leg: No edema. Left lower leg: No edema. Lymphadenopathy:      Cervical: No cervical adenopathy. Skin:     Capillary Refill: Capillary refill takes less than 2 seconds. Coloration: Skin is not pale. Neurological:      Mental Status: She is alert. Mental status is at baseline. She is disoriented. Comments: Not oriented to time. Psychiatric:         Mood and Affect: Mood normal.         Speech: Speech normal.         Thought Content:  Thought content normal.         Judgment: Judgment normal.         LABS:    CBC:   Recent Labs     04/22/20  1100 04/23/20  0500 04/24/20  0602   WBC 8.7 18.4* 14.3*   HGB 13.0 12.8 12.6   HCT 40.0 39.2 38.2    161 151   MCV 97.2 99.8 97.9     Renal:    Recent Labs     04/22/20  1100 04/23/20  0500 04/24/20  0602   * 136 135*   K 4.2 4.0 4.2   CL 92* 95* 96*   CO2 28 27 25   BUN 16 19 14   CREATININE 1.1 1.2 1.2   GLUCOSE 96 93 105*   CALCIUM 8.8 9.3 9.4   MG 2.30  --   --    ANIONGAP 13 14 14     Hepatic:   Recent Labs     04/22/20  1100

## 2020-04-24 NOTE — CARE COORDINATION
Case Management Assessment           Initial Evaluation                Date / Time of Evaluation: 4/24/2020 11:52 AM                 Assessment Completed by: Alice Lim    Patient Name: Beth Paredes     YOB: 1960  Diagnosis: New onset seizure [facility]) [R56.9]     Date / Time: 4/22/2020  7:50 PM    Patient Admission Status: Inpatient    If patient is discharged prior to next notation, then this note serves as note for discharge by case management. Current PCP: Yaritza Valladares [address] Patient: No    Chart Reviewed: Yes  Patient/ Family Interviewed: Yes    Initial assessment completed at bedside with: spoke with sisterEmeli, legal guardian    Hospitalization in the last 30 days: No    Emergency Contacts:  Extended Emergency Contact Information  Primary Emergency Contact: [address] [place] [address] [address] Phone: [phone]  Relation: Other  Secondary Emergency Contact: [facility]  Address: [address] [place] [address] Phone: [phone]  Work Phone: [phone]  Relation: [address] Directives:   Code Status: [address] [place] [address]: No  Agent: Mirta Michaud (legal guardian)  Contact Number: [phone]    Copy present: No     In paper Chart: No    Scanned into EMR No    Financial  Payor: MEDICARE / Plan: MEDICARE PART A AND B / Product Type: *No Product type* /     Pre-cert required for SNF: No    Pharmacy    [facility] [address] [place] [address],[address]  [address] [place] [address] [address] [id number]  Phone: [phone] Fax: [phone]      Potential assistance Purchasing Medications: Potential Assistance Purchasing Medications: No  Does Patient want to participate in local refill/ meds to beds program?: Yes    Meds To Beds General Rules:  1. Can ONLY be done Monday- Friday between 8:30am-5pm  2.  Prescription(s) must be in

## 2020-04-24 NOTE — PROGRESS NOTES
Spoke with patient's sister and she stated the patient will not do well with the MRI and she will need to be sedated.  Perfect serve sent to Dr. Sims Erps

## 2020-04-25 LAB
A/G RATIO: 0.9 (ref 1.1–2.2)
ALBUMIN SERPL-MCNC: 3.1 G/DL (ref 3.4–5)
ALP BLD-CCNC: 177 U/L (ref 40–129)
ALT SERPL-CCNC: 24 U/L (ref 10–40)
ANION GAP SERPL CALCULATED.3IONS-SCNC: 14 MMOL/L (ref 3–16)
AST SERPL-CCNC: 18 U/L (ref 15–37)
BASOPHILS ABSOLUTE: 0 K/UL (ref 0–0.2)
BASOPHILS RELATIVE PERCENT: 0.3 %
BILIRUB SERPL-MCNC: 0.5 MG/DL (ref 0–1)
BUN BLDV-MCNC: 11 MG/DL (ref 7–20)
CALCIUM SERPL-MCNC: 9.5 MG/DL (ref 8.3–10.6)
CHLORIDE BLD-SCNC: 98 MMOL/L (ref 99–110)
CO2: 25 MMOL/L (ref 21–32)
CREAT SERPL-MCNC: 0.8 MG/DL (ref 0.6–1.2)
EOSINOPHILS ABSOLUTE: 0 K/UL (ref 0–0.6)
EOSINOPHILS RELATIVE PERCENT: 0.4 %
GFR AFRICAN AMERICAN: >60
GFR NON-AFRICAN AMERICAN: >60
GLOBULIN: 3.5 G/DL
GLUCOSE BLD-MCNC: 120 MG/DL (ref 70–99)
HCT VFR BLD CALC: 32.8 % (ref 36–48)
HEMOGLOBIN: 10.9 G/DL (ref 12–16)
LYMPHOCYTES ABSOLUTE: 1.1 K/UL (ref 1–5.1)
LYMPHOCYTES RELATIVE PERCENT: 8.9 %
MCH RBC QN AUTO: 32.8 PG (ref 26–34)
MCHC RBC AUTO-ENTMCNC: 33.3 G/DL (ref 31–36)
MCV RBC AUTO: 98.6 FL (ref 80–100)
MONOCYTES ABSOLUTE: 0.7 K/UL (ref 0–1.3)
MONOCYTES RELATIVE PERCENT: 5.5 %
NEUTROPHILS ABSOLUTE: 10.4 K/UL (ref 1.7–7.7)
NEUTROPHILS RELATIVE PERCENT: 84.9 %
PDW BLD-RTO: 13.4 % (ref 12.4–15.4)
PLATELET # BLD: 157 K/UL (ref 135–450)
PMV BLD AUTO: 8.7 FL (ref 5–10.5)
POTASSIUM REFLEX MAGNESIUM: 4.3 MMOL/L (ref 3.5–5.1)
RBC # BLD: 3.32 M/UL (ref 4–5.2)
SODIUM BLD-SCNC: 137 MMOL/L (ref 136–145)
TOTAL PROTEIN: 6.6 G/DL (ref 6.4–8.2)
WBC # BLD: 12.2 K/UL (ref 4–11)

## 2020-04-25 PROCEDURE — 2580000003 HC RX 258: Performed by: INTERNAL MEDICINE

## 2020-04-25 PROCEDURE — 94761 N-INVAS EAR/PLS OXIMETRY MLT: CPT

## 2020-04-25 PROCEDURE — 2700000000 HC OXYGEN THERAPY PER DAY

## 2020-04-25 PROCEDURE — 1200000000 HC SEMI PRIVATE

## 2020-04-25 PROCEDURE — 2580000003 HC RX 258: Performed by: STUDENT IN AN ORGANIZED HEALTH CARE EDUCATION/TRAINING PROGRAM

## 2020-04-25 PROCEDURE — 6360000002 HC RX W HCPCS: Performed by: INTERNAL MEDICINE

## 2020-04-25 PROCEDURE — 85025 COMPLETE CBC W/AUTO DIFF WBC: CPT

## 2020-04-25 PROCEDURE — 2500000003 HC RX 250 WO HCPCS: Performed by: STUDENT IN AN ORGANIZED HEALTH CARE EDUCATION/TRAINING PROGRAM

## 2020-04-25 PROCEDURE — 36415 COLL VENOUS BLD VENIPUNCTURE: CPT

## 2020-04-25 PROCEDURE — 51798 US URINE CAPACITY MEASURE: CPT

## 2020-04-25 PROCEDURE — 6370000000 HC RX 637 (ALT 250 FOR IP): Performed by: NURSE PRACTITIONER

## 2020-04-25 PROCEDURE — 6370000000 HC RX 637 (ALT 250 FOR IP): Performed by: INTERNAL MEDICINE

## 2020-04-25 PROCEDURE — 80053 COMPREHEN METABOLIC PANEL: CPT

## 2020-04-25 PROCEDURE — 6360000002 HC RX W HCPCS: Performed by: STUDENT IN AN ORGANIZED HEALTH CARE EDUCATION/TRAINING PROGRAM

## 2020-04-25 PROCEDURE — 94640 AIRWAY INHALATION TREATMENT: CPT

## 2020-04-25 RX ORDER — ATORVASTATIN CALCIUM 40 MG/1
40 TABLET, FILM COATED ORAL NIGHTLY
Status: DISCONTINUED | OUTPATIENT
Start: 2020-04-25 | End: 2020-04-28 | Stop reason: HOSPADM

## 2020-04-25 RX ORDER — ASPIRIN 81 MG/1
81 TABLET, CHEWABLE ORAL DAILY
Status: DISCONTINUED | OUTPATIENT
Start: 2020-04-25 | End: 2020-04-28 | Stop reason: HOSPADM

## 2020-04-25 RX ADMIN — ASPIRIN 81 MG 81 MG: 81 TABLET ORAL at 10:20

## 2020-04-25 RX ADMIN — ARIPIPRAZOLE 5 MG: 2 TABLET ORAL at 08:39

## 2020-04-25 RX ADMIN — BUPROPION HYDROCHLORIDE 200 MG: 100 TABLET, FILM COATED, EXTENDED RELEASE ORAL at 08:43

## 2020-04-25 RX ADMIN — PANTOPRAZOLE SODIUM 40 MG: 40 TABLET, DELAYED RELEASE ORAL at 06:55

## 2020-04-25 RX ADMIN — CEFTRIAXONE 1 G: 1 INJECTION, POWDER, FOR SOLUTION INTRAMUSCULAR; INTRAVENOUS at 17:28

## 2020-04-25 RX ADMIN — AZITHROMYCIN MONOHYDRATE 250 MG: 500 INJECTION, POWDER, LYOPHILIZED, FOR SOLUTION INTRAVENOUS at 20:01

## 2020-04-25 RX ADMIN — Medication 10 ML: at 21:49

## 2020-04-25 RX ADMIN — MOMETASONE FUROATE AND FORMOTEROL FUMARATE DIHYDRATE 2 PUFF: 200; 5 AEROSOL RESPIRATORY (INHALATION) at 12:46

## 2020-04-25 RX ADMIN — ATORVASTATIN CALCIUM 40 MG: 40 TABLET, FILM COATED ORAL at 21:49

## 2020-04-25 RX ADMIN — Medication 10 ML: at 08:43

## 2020-04-25 RX ADMIN — ENOXAPARIN SODIUM 40 MG: 40 INJECTION SUBCUTANEOUS at 08:43

## 2020-04-25 RX ADMIN — ACETAMINOPHEN 650 MG: 325 TABLET ORAL at 15:48

## 2020-04-25 RX ADMIN — MONTELUKAST 10 MG: 10 TABLET, FILM COATED ORAL at 21:49

## 2020-04-25 RX ADMIN — METRONIDAZOLE 500 MG: 500 INJECTION, SOLUTION INTRAVENOUS at 23:42

## 2020-04-25 RX ADMIN — METRONIDAZOLE 500 MG: 500 INJECTION, SOLUTION INTRAVENOUS at 18:02

## 2020-04-25 RX ADMIN — LEVETIRACETAM 500 MG: 500 TABLET ORAL at 21:49

## 2020-04-25 RX ADMIN — METRONIDAZOLE 500 MG: 500 INJECTION, SOLUTION INTRAVENOUS at 08:45

## 2020-04-25 RX ADMIN — LEVETIRACETAM 500 MG: 500 TABLET ORAL at 08:43

## 2020-04-25 RX ADMIN — MOMETASONE FUROATE AND FORMOTEROL FUMARATE DIHYDRATE 2 PUFF: 200; 5 AEROSOL RESPIRATORY (INHALATION) at 20:52

## 2020-04-25 RX ADMIN — CARVEDILOL 3.12 MG: 3.12 TABLET, FILM COATED ORAL at 17:41

## 2020-04-25 ASSESSMENT — PAIN - FUNCTIONAL ASSESSMENT: PAIN_FUNCTIONAL_ASSESSMENT: PREVENTS OR INTERFERES SOME ACTIVE ACTIVITIES AND ADLS

## 2020-04-25 ASSESSMENT — VISUAL ACUITY: OU: 1

## 2020-04-25 ASSESSMENT — PAIN SCALES - GENERAL
PAINLEVEL_OUTOF10: 10
PAINLEVEL_OUTOF10: 0
PAINLEVEL_OUTOF10: 0

## 2020-04-25 ASSESSMENT — PAIN DESCRIPTION - PAIN TYPE: TYPE: CHRONIC PAIN

## 2020-04-25 ASSESSMENT — PAIN DESCRIPTION - DESCRIPTORS: DESCRIPTORS: ACHING

## 2020-04-25 ASSESSMENT — PAIN DESCRIPTION - FREQUENCY: FREQUENCY: CONTINUOUS

## 2020-04-25 ASSESSMENT — PAIN DESCRIPTION - PROGRESSION: CLINICAL_PROGRESSION: NOT CHANGED

## 2020-04-25 ASSESSMENT — PAIN DESCRIPTION - ORIENTATION: ORIENTATION: RIGHT;LEFT

## 2020-04-25 ASSESSMENT — PAIN DESCRIPTION - ONSET: ONSET: ON-GOING

## 2020-04-25 ASSESSMENT — PAIN DESCRIPTION - LOCATION: LOCATION: KNEE;LEG

## 2020-04-25 NOTE — PLAN OF CARE
Problem: Falls - Risk of:  Goal: Will remain free from falls  Description: Will remain free from falls  Outcome: Ongoing  Note: Patient will remain free from falls. Patient will use call light to notify staff of needs prior to exiting the bed. Problem: Pain:  Goal: Pain level will decrease  Description: Pain level will decrease  Outcome: Ongoing  Note: Patient's pain will continue to be managed per the [facility] Problem: Coping:  Goal: Ability to cope will improve  Description: Ability to cope will improve  Outcome: Ongoing  Note: Patient's ability to cope will continue to improve.

## 2020-04-25 NOTE — PROGRESS NOTES
Progress Note    Admit Date: 4/22/2020  Day: 2  Diet: DIET CARB CONTROL; Safety Tray; Safety Tray (Disposables No Utensils)    CC: AMS    Interval history: Pt was seen and examined at bedside. Says she was more sob yesterday but is better today. Still has cough with green sputum but thinks this is also better. No chest pain, abdominal pain, N/V diarrhea, constipation, or leg edema. Overall feels better. Medications:     Scheduled Meds:   levETIRAcetam  500 mg Oral BID    cefTRIAXone (ROCEPHIN) IV  1 g Intravenous Q24H    metroNIDAZOLE  500 mg Intravenous Q8H    mometasone-formoterol  2 puff Inhalation BID    ARIPiprazole  5 mg Oral Daily    atorvastatin  10 mg Oral Nightly    carvedilol  3.125 mg Oral BID WC    fluticasone  1 spray Nasal Daily    montelukast  10 mg Oral Nightly    pantoprazole  40 mg Oral QAM AC    sodium chloride flush  10 mL Intravenous 2 times per day    enoxaparin  40 mg Subcutaneous Daily    azithromycin  250 mg Intravenous Q24H    buPROPion  200 mg Oral Daily     Continuous Infusions:  PRN Meds:sodium chloride flush, acetaminophen **OR** acetaminophen, polyethylene glycol, promethazine **OR** ondansetron, albuterol sulfate HFA    Objective:   Vitals:   T-max:  Patient Vitals for the past 8 hrs:   BP Temp Temp src Pulse SpO2   04/25/20 0206 114/66 98.1 °F (36.7 °C) Axillary 88 94 %       Intake/Output Summary (Last 24 hours) at 4/25/2020 5313  Last data filed at 4/24/2020 2203  Gross per 24 hour   Intake 120 ml   Output 150 ml   Net -30 ml       Review of Systems    ROS: A 10 point review of systems was conducted, significant findings as noted in HPI. Physical Exam  Vitals signs and nursing note reviewed. Constitutional:       General: She is not in acute distress. Appearance: Normal appearance. She is obese. She is not ill-appearing. HENT:      Head: Normocephalic and atraumatic. Eyes:      General: Vision grossly intact.       Conjunctiva/sclera: Conjunctivae normal.   Neck:      Musculoskeletal: Full passive range of motion without pain, normal range of motion and neck supple. Cardiovascular:      Rate and Rhythm: Normal rate and regular rhythm. Pulses: Normal pulses. Heart sounds: Normal heart sounds, S1 normal and S2 normal. No murmur. No friction rub. No gallop. Pulmonary:      Effort: Pulmonary effort is normal. No respiratory distress. Breath sounds: Normal air entry. No stridor. Rales (in anterior upper chest and crackles in b/l LLB) present. No wheezing. Comments: Decreased breath sounds lower right lung. Chest:      Chest wall: No tenderness. Abdominal:      General: Bowel sounds are normal. There is no distension. Palpations: Abdomen is soft. Tenderness: There is no abdominal tenderness. Musculoskeletal: Normal range of motion. Right lower leg: No edema. Left lower leg: No edema. Lymphadenopathy:      Cervical: No cervical adenopathy. Skin:     Capillary Refill: Capillary refill takes less than 2 seconds. Coloration: Skin is not pale. Neurological:      Mental Status: She is alert. Mental status is at baseline. Comments: Not oriented to time. Psychiatric:         Mood and Affect: Mood normal.         Speech: Speech normal.         Thought Content:  Thought content normal.         Judgment: Judgment normal.         LABS:    CBC:   Recent Labs     04/22/20  1100 04/23/20  0500 04/24/20  0602   WBC 8.7 18.4* 14.3*   HGB 13.0 12.8 12.6   HCT 40.0 39.2 38.2    161 151   MCV 97.2 99.8 97.9     Renal:    Recent Labs     04/22/20  1100 04/23/20  0500 04/24/20  0602   * 136 135*   K 4.2 4.0 4.2   CL 92* 95* 96*   CO2 28 27 25   BUN 16 19 14   CREATININE 1.1 1.2 1.2   GLUCOSE 96 93 105*   CALCIUM 8.8 9.3 9.4   MG 2.30  --   --    ANIONGAP 13 14 14     Hepatic:   Recent Labs     04/22/20  1100 04/23/20  0500 04/24/20  0602   AST 25 43* 23   ALT 24 45* 27   BILITOT <0.2 0.7 0.5   PROT 8.0 7.3 7. 0   LABALBU 4.0 3.9 3.3*   ALKPHOS 169* 166* 154*     Troponin:   Recent Labs     04/22/20  1100   TROPONINI <0.01     BNP: No results for input(s): BNP in the last 72 hours. Lipids: No results for input(s): CHOL, HDL in the last 72 hours. Invalid input(s): LDLCALCU, TRIGLYCERIDE  ABGs:  No results for input(s): PHART, JBK4BEF, PO2ART, UQB8DQD, BEART, THGBART, T8YOTFJQ, NLT9PGX in the last 72 hours. INR: No results for input(s): INR in the last 72 hours. Lactate: No results for input(s): LACTATE in the last 72 hours. Cultures:  -----------------------------------------------------------------  RAD:   MRI BRAIN W WO CONTRAST   Final Result      1. No acute intracranial abnormality. 2. Right parafalcine partially calcified small meningioma. 3. Remote cortical infarcts left frontal and left occipital lobes. 4. Remote lacunar infarct right caudate nucleus. 5. Mild burden T2 hyperintense white matter disease, nonspecific, likely relate to chronic microvascular ischemia. Assessment/Plan:   Daniel Sage is a 61 y.o. female, who was presents w/ AMS after seizure and is being admitted for seizure w/o. Acute hypoxic respiratory failure secondary to multifocal bilateral pneumonia (bacterial vs viral). COVID -ve. Elevated LDH and CRP. On 1L NC now. CT chest with multifocal bilateral pneumonia mainly in RLL. Could be asp pneumonia 2/2 to recent seizure. - Continue IV AZT, rocephin, Flagyl added to cover for possible aspiration also (penicillin allergy, sister cannot provide any further info). - F/u resp cx  - Wean O2 today     Acute encephalopathy-improved   Pt only disoriented to time this AM.  - Continue monitor     New onset seizures  Pt has a witness episode of shaking with tongue biting and urinary incontinence followed but AMS which appeared to be post ictal state.  Has h/o cortical stroke by CT on admission.  - Neurology following  - MRI showing olds strokes, likely seizure focus  -

## 2020-04-26 LAB
A/G RATIO: 0.8 (ref 1.1–2.2)
ALBUMIN SERPL-MCNC: 2.8 G/DL (ref 3.4–5)
ALP BLD-CCNC: 232 U/L (ref 40–129)
ALT SERPL-CCNC: 42 U/L (ref 10–40)
ANION GAP SERPL CALCULATED.3IONS-SCNC: 12 MMOL/L (ref 3–16)
AST SERPL-CCNC: 38 U/L (ref 15–37)
BASOPHILS ABSOLUTE: 0 K/UL (ref 0–0.2)
BASOPHILS RELATIVE PERCENT: 0.3 %
BILIRUB SERPL-MCNC: 0.6 MG/DL (ref 0–1)
BLOOD CULTURE, ROUTINE: NORMAL
BUN BLDV-MCNC: 8 MG/DL (ref 7–20)
CALCIUM SERPL-MCNC: 9.2 MG/DL (ref 8.3–10.6)
CHLORIDE BLD-SCNC: 99 MMOL/L (ref 99–110)
CO2: 25 MMOL/L (ref 21–32)
CREAT SERPL-MCNC: 0.7 MG/DL (ref 0.6–1.2)
CULTURE, BLOOD 2: NORMAL
EOSINOPHILS ABSOLUTE: 0.1 K/UL (ref 0–0.6)
EOSINOPHILS RELATIVE PERCENT: 0.8 %
FOLATE: 7.79 NG/ML (ref 4.78–24.2)
GAMMA GLUTAMYL TRANSFERASE: 256 U/L (ref 5–36)
GFR AFRICAN AMERICAN: >60
GFR NON-AFRICAN AMERICAN: >60
GLOBULIN: 3.5 G/DL
GLUCOSE BLD-MCNC: 121 MG/DL (ref 70–99)
HCT VFR BLD CALC: 34.4 % (ref 36–48)
HEMOGLOBIN: 10.9 G/DL (ref 12–16)
LYMPHOCYTES ABSOLUTE: 1.1 K/UL (ref 1–5.1)
LYMPHOCYTES RELATIVE PERCENT: 10.4 %
MCH RBC QN AUTO: 33.1 PG (ref 26–34)
MCHC RBC AUTO-ENTMCNC: 31.6 G/DL (ref 31–36)
MCV RBC AUTO: 104.6 FL (ref 80–100)
MONOCYTES ABSOLUTE: 0.6 K/UL (ref 0–1.3)
MONOCYTES RELATIVE PERCENT: 5.8 %
NEUTROPHILS ABSOLUTE: 8.6 K/UL (ref 1.7–7.7)
NEUTROPHILS RELATIVE PERCENT: 82.7 %
PDW BLD-RTO: 14.3 % (ref 12.4–15.4)
PLATELET # BLD: 170 K/UL (ref 135–450)
PMV BLD AUTO: 8.2 FL (ref 5–10.5)
POTASSIUM REFLEX MAGNESIUM: 4.2 MMOL/L (ref 3.5–5.1)
RBC # BLD: 3.29 M/UL (ref 4–5.2)
SODIUM BLD-SCNC: 136 MMOL/L (ref 136–145)
TOTAL PROTEIN: 6.3 G/DL (ref 6.4–8.2)
VITAMIN B-12: 446 PG/ML (ref 211–911)
WBC # BLD: 10.4 K/UL (ref 4–11)

## 2020-04-26 PROCEDURE — 6370000000 HC RX 637 (ALT 250 FOR IP): Performed by: INTERNAL MEDICINE

## 2020-04-26 PROCEDURE — 6370000000 HC RX 637 (ALT 250 FOR IP): Performed by: NURSE PRACTITIONER

## 2020-04-26 PROCEDURE — 94150 VITAL CAPACITY TEST: CPT

## 2020-04-26 PROCEDURE — 36415 COLL VENOUS BLD VENIPUNCTURE: CPT

## 2020-04-26 PROCEDURE — 97116 GAIT TRAINING THERAPY: CPT

## 2020-04-26 PROCEDURE — 6360000002 HC RX W HCPCS: Performed by: STUDENT IN AN ORGANIZED HEALTH CARE EDUCATION/TRAINING PROGRAM

## 2020-04-26 PROCEDURE — 85025 COMPLETE CBC W/AUTO DIFF WBC: CPT

## 2020-04-26 PROCEDURE — 6360000002 HC RX W HCPCS: Performed by: INTERNAL MEDICINE

## 2020-04-26 PROCEDURE — 2580000003 HC RX 258: Performed by: INTERNAL MEDICINE

## 2020-04-26 PROCEDURE — 2700000000 HC OXYGEN THERAPY PER DAY

## 2020-04-26 PROCEDURE — 82607 VITAMIN B-12: CPT

## 2020-04-26 PROCEDURE — 1200000000 HC SEMI PRIVATE

## 2020-04-26 PROCEDURE — 6370000000 HC RX 637 (ALT 250 FOR IP): Performed by: STUDENT IN AN ORGANIZED HEALTH CARE EDUCATION/TRAINING PROGRAM

## 2020-04-26 PROCEDURE — 94640 AIRWAY INHALATION TREATMENT: CPT

## 2020-04-26 PROCEDURE — 2500000003 HC RX 250 WO HCPCS: Performed by: STUDENT IN AN ORGANIZED HEALTH CARE EDUCATION/TRAINING PROGRAM

## 2020-04-26 PROCEDURE — 80053 COMPREHEN METABOLIC PANEL: CPT

## 2020-04-26 PROCEDURE — 97162 PT EVAL MOD COMPLEX 30 MIN: CPT

## 2020-04-26 PROCEDURE — 2580000003 HC RX 258: Performed by: STUDENT IN AN ORGANIZED HEALTH CARE EDUCATION/TRAINING PROGRAM

## 2020-04-26 PROCEDURE — 94761 N-INVAS EAR/PLS OXIMETRY MLT: CPT

## 2020-04-26 PROCEDURE — 82746 ASSAY OF FOLIC ACID SERUM: CPT

## 2020-04-26 PROCEDURE — 97530 THERAPEUTIC ACTIVITIES: CPT

## 2020-04-26 PROCEDURE — 82977 ASSAY OF GGT: CPT

## 2020-04-26 RX ORDER — METRONIDAZOLE 500 MG/1
500 TABLET ORAL EVERY 8 HOURS SCHEDULED
Status: DISCONTINUED | OUTPATIENT
Start: 2020-04-26 | End: 2020-04-28 | Stop reason: HOSPADM

## 2020-04-26 RX ORDER — AZITHROMYCIN 250 MG/1
250 TABLET, FILM COATED ORAL DAILY
Status: COMPLETED | OUTPATIENT
Start: 2020-04-26 | End: 2020-04-26

## 2020-04-26 RX ORDER — SODIUM CHLORIDE 9 MG/ML
INJECTION, SOLUTION INTRAVENOUS
Status: DISPENSED
Start: 2020-04-26 | End: 2020-04-26

## 2020-04-26 RX ADMIN — CEFTRIAXONE 1 G: 1 INJECTION, POWDER, FOR SOLUTION INTRAMUSCULAR; INTRAVENOUS at 14:49

## 2020-04-26 RX ADMIN — ARIPIPRAZOLE 5 MG: 2 TABLET ORAL at 08:08

## 2020-04-26 RX ADMIN — BUPROPION HYDROCHLORIDE 200 MG: 100 TABLET, FILM COATED, EXTENDED RELEASE ORAL at 08:03

## 2020-04-26 RX ADMIN — AZITHROMYCIN MONOHYDRATE 250 MG: 250 TABLET ORAL at 17:15

## 2020-04-26 RX ADMIN — METRONIDAZOLE 500 MG: 500 INJECTION, SOLUTION INTRAVENOUS at 08:14

## 2020-04-26 RX ADMIN — ATORVASTATIN CALCIUM 40 MG: 40 TABLET, FILM COATED ORAL at 21:55

## 2020-04-26 RX ADMIN — CARVEDILOL 3.12 MG: 3.12 TABLET, FILM COATED ORAL at 17:15

## 2020-04-26 RX ADMIN — Medication 10 ML: at 21:55

## 2020-04-26 RX ADMIN — METRONIDAZOLE 500 MG: 500 TABLET ORAL at 14:49

## 2020-04-26 RX ADMIN — FLUTICASONE PROPIONATE 1 SPRAY: 50 SPRAY, METERED NASAL at 08:08

## 2020-04-26 RX ADMIN — ACETAMINOPHEN 650 MG: 325 TABLET ORAL at 05:48

## 2020-04-26 RX ADMIN — LEVETIRACETAM 500 MG: 500 TABLET ORAL at 21:55

## 2020-04-26 RX ADMIN — ENOXAPARIN SODIUM 40 MG: 40 INJECTION SUBCUTANEOUS at 08:03

## 2020-04-26 RX ADMIN — PANTOPRAZOLE SODIUM 40 MG: 40 TABLET, DELAYED RELEASE ORAL at 06:35

## 2020-04-26 RX ADMIN — MOMETASONE FUROATE AND FORMOTEROL FUMARATE DIHYDRATE 2 PUFF: 200; 5 AEROSOL RESPIRATORY (INHALATION) at 20:17

## 2020-04-26 RX ADMIN — MONTELUKAST 10 MG: 10 TABLET, FILM COATED ORAL at 21:55

## 2020-04-26 RX ADMIN — MOMETASONE FUROATE AND FORMOTEROL FUMARATE DIHYDRATE 2 PUFF: 200; 5 AEROSOL RESPIRATORY (INHALATION) at 08:49

## 2020-04-26 RX ADMIN — ASPIRIN 81 MG 81 MG: 81 TABLET ORAL at 08:03

## 2020-04-26 RX ADMIN — Medication 10 ML: at 08:04

## 2020-04-26 RX ADMIN — LEVETIRACETAM 500 MG: 500 TABLET ORAL at 08:03

## 2020-04-26 RX ADMIN — METRONIDAZOLE 500 MG: 500 TABLET ORAL at 21:55

## 2020-04-26 ASSESSMENT — PAIN DESCRIPTION - DESCRIPTORS: DESCRIPTORS: HEADACHE

## 2020-04-26 ASSESSMENT — VISUAL ACUITY: OU: 1

## 2020-04-26 ASSESSMENT — PAIN SCALES - GENERAL
PAINLEVEL_OUTOF10: 3
PAINLEVEL_OUTOF10: 0

## 2020-04-26 ASSESSMENT — PAIN - FUNCTIONAL ASSESSMENT: PAIN_FUNCTIONAL_ASSESSMENT: ACTIVITIES ARE NOT PREVENTED

## 2020-04-26 ASSESSMENT — PAIN DESCRIPTION - LOCATION: LOCATION: HEAD

## 2020-04-26 ASSESSMENT — PAIN DESCRIPTION - FREQUENCY: FREQUENCY: INTERMITTENT

## 2020-04-26 ASSESSMENT — PAIN DESCRIPTION - ONSET: ONSET: ON-GOING

## 2020-04-26 ASSESSMENT — PAIN DESCRIPTION - PROGRESSION: CLINICAL_PROGRESSION: NOT CHANGED

## 2020-04-26 ASSESSMENT — PAIN DESCRIPTION - PAIN TYPE: TYPE: ACUTE PAIN

## 2020-04-26 NOTE — PLAN OF CARE
Problem: Falls - Risk of:  Goal: Will remain free from falls  Description: Fall precautions in place. Bed is in lowest position, wheels locked, alarm on, non-skid socks on. Call light and bedside table within reach. Patient calls out appropriately. Patient is up x1 assist with a walker. Will continue to assess and monitor. 4/26/2020 0754 by Marjorie Mclain RN  Outcome: Ongoing         Problem: Respiratory:  Goal: Ability to maintain a clear airway will improve  Description: Ability to maintain a clear airway will improve. Pt coughs frequently, still on 2 L nasal cannula.   Outcome: Ongoing

## 2020-04-26 NOTE — PROGRESS NOTES
Pt is alert and oriented, still disoriented to time. Pt tolerating diet well, denies nausea/vomiting. No seizures since admission. Up x1 with walker, goes to the bathroom frequently to void and have solid bowel movements. VSS, on 1L nasal cannula. Pt frequently takes off her oxygen tubing.

## 2020-04-26 NOTE — PROGRESS NOTES
Patient alert and oriented x4. Up x1 with walker. No c/o pain. Pt has garbled speech at baseline. Fall precautions in place, call light within reach, bed alarm on, bed in lowest position, and non skid socks on. VSS. Denies needs at this time. Will continue to monitor.

## 2020-04-26 NOTE — PROGRESS NOTES
carryover noted  Ambulation  Ambulation?: Yes  Ambulation 1  Device: Rolling Walker  Assistance: Contact guard assistance  Quality of Gait: slow, fairly steady overall, min cues for turning, symmetrical step length  Distance: 15 ft; 100 ft; 5 ft     Balance  Sitting - Static: Good  Sitting - Dynamic: Good  Standing - Static: Good(SBA while standing at sink to wash hands)  Standing - Dynamic: Fair      Treatment included gait and transfer training, pt education. Plan   Plan  Times per week: 2-5  Current Treatment Recommendations: Transfer Training, Patient/Caregiver Education & Training, Gait Training, Balance Training, Functional Mobility Training, Stair training, Equipment Evaluation, Education, & procurement  Safety Devices  Type of devices: Call light within reach, Left in bed, Bed alarm in place, Nurse notified, Gait belt    G-Code       OutComes Score                                                  AM-PAC Score  AM-PAC Inpatient Mobility Raw Score : 19 (04/26/20 1337)  AM-PAC Inpatient T-Scale Score : 45.44 (04/26/20 1337)  Mobility Inpatient CMS 0-100% Score: 41.77 (04/26/20 1337)  Mobility Inpatient CMS G-Code Modifier : CK (04/26/20 1337)          Goals  Short term goals  Time Frame for Short term goals: discharge  Short term goal 1: Pt will transfer supine <--> sit with mod I  Short term goal 2: pt will transfer sit <--> stand with supervision  Short term goal 3: Pt will amb 150 ft with RW and supervision  Short term goal 4: Pt will negotiate 4 steps with rail and CGA  Patient Goals   Patient goals : return home       Therapy Time   Individual Concurrent Group Co-treatment   Time In 1132         Time Out 1217         Minutes 45                 Timed Code Treatment Minutes:  30    Total Treatment Minutes:  45    If patient is discharged prior to next treatment, this note will serve as the discharge summary.   Luisa Hackett, PT, DPT  [id number]

## 2020-04-26 NOTE — PROGRESS NOTES
Recent Labs     04/24/20  0602 04/25/20  0902 04/26/20  0509   * 137 136   K 4.2 4.3 4.2   CL 96* 98* 99   CO2 25 25 25   BUN 14 11 8   CREATININE 1.2 0.8 0.7   GLUCOSE 105* 120* 121*   CALCIUM 9.4 9.5 9.2   ANIONGAP 14 14 12     Hepatic:   Recent Labs     04/24/20  0602 04/25/20  0902 04/26/20  0509   AST 23 18 38*   ALT 27 24 42*   BILITOT 0.5 0.5 0.6   PROT 7.0 6.6 6.3*   LABALBU 3.3* 3.1* 2.8*   ALKPHOS 154* 177* 232*     Troponin:   No results for input(s): TROPONINI in the last 72 hours. BNP: No results for input(s): BNP in the last 72 hours. Lipids: No results for input(s): CHOL, HDL in the last 72 hours. Invalid input(s): LDLCALCU, TRIGLYCERIDE  ABGs:  No results for input(s): PHART, BIT1AFK, PO2ART, CAM0ZPR, BEART, THGBART, A1VXZIPC, ZKC7KOF in the last 72 hours. INR: No results for input(s): INR in the last 72 hours. Lactate: No results for input(s): LACTATE in the last 72 hours. Cultures:  -----------------------------------------------------------------  RAD:   MRI BRAIN W WO CONTRAST   Final Result      1. No acute intracranial abnormality. 2. Right parafalcine partially calcified small meningioma. 3. Remote cortical infarcts left frontal and left occipital lobes. 4. Remote lacunar infarct right caudate nucleus. 5. Mild burden T2 hyperintense white matter disease, nonspecific, likely relate to chronic microvascular ischemia. Assessment/Plan:   Barry Aguila is a 61 y.o. female, who was presents w/ AMS after seizure and is being admitted for seizure w/o. Acute hypoxic respiratory failure secondary to multifocal bilateral pneumonia (bacterial vs viral). COVID -ve. Elevated LDH and CRP. On 1L NC now. CT chest with multifocal bilateral pneumonia mainly in RLL. Could be asp pneumonia 2/2 to recent seizure. On RA. Res panel -ve. - Continue Antibiotics: AZT, Rocephin and Flagyl (penicillin allergy, sister cannot provide any further info).    - F/u resp cx  -

## 2020-04-27 ENCOUNTER — APPOINTMENT (OUTPATIENT)
Dept: ULTRASOUND IMAGING | Age: 60
DRG: 177 | End: 2020-04-27
Attending: INTERNAL MEDICINE
Payer: MEDICARE

## 2020-04-27 LAB
A/G RATIO: 0.9 (ref 1.1–2.2)
ALBUMIN SERPL-MCNC: 3.1 G/DL (ref 3.4–5)
ALP BLD-CCNC: 219 U/L (ref 40–129)
ALT SERPL-CCNC: 35 U/L (ref 10–40)
ANION GAP SERPL CALCULATED.3IONS-SCNC: 12 MMOL/L (ref 3–16)
AST SERPL-CCNC: 26 U/L (ref 15–37)
BASOPHILS ABSOLUTE: 0 K/UL (ref 0–0.2)
BASOPHILS RELATIVE PERCENT: 0.5 %
BILIRUB SERPL-MCNC: 0.7 MG/DL (ref 0–1)
BUN BLDV-MCNC: 8 MG/DL (ref 7–20)
CALCIUM SERPL-MCNC: 9.5 MG/DL (ref 8.3–10.6)
CHLORIDE BLD-SCNC: 100 MMOL/L (ref 99–110)
CO2: 26 MMOL/L (ref 21–32)
CREAT SERPL-MCNC: 0.7 MG/DL (ref 0.6–1.2)
EOSINOPHILS ABSOLUTE: 0.1 K/UL (ref 0–0.6)
EOSINOPHILS RELATIVE PERCENT: 1.2 %
GFR AFRICAN AMERICAN: >60
GFR NON-AFRICAN AMERICAN: >60
GLOBULIN: 3.4 G/DL
GLUCOSE BLD-MCNC: 89 MG/DL (ref 70–99)
HCT VFR BLD CALC: 33.6 % (ref 36–48)
HEMOGLOBIN: 10.9 G/DL (ref 12–16)
LYMPHOCYTES ABSOLUTE: 1.1 K/UL (ref 1–5.1)
LYMPHOCYTES RELATIVE PERCENT: 12.7 %
MCH RBC QN AUTO: 32.6 PG (ref 26–34)
MCHC RBC AUTO-ENTMCNC: 32.5 G/DL (ref 31–36)
MCV RBC AUTO: 100.5 FL (ref 80–100)
MONOCYTES ABSOLUTE: 0.7 K/UL (ref 0–1.3)
MONOCYTES RELATIVE PERCENT: 7.5 %
NEUTROPHILS ABSOLUTE: 6.9 K/UL (ref 1.7–7.7)
NEUTROPHILS RELATIVE PERCENT: 78.1 %
PDW BLD-RTO: 14 % (ref 12.4–15.4)
PLATELET # BLD: 196 K/UL (ref 135–450)
PMV BLD AUTO: 8.6 FL (ref 5–10.5)
POTASSIUM REFLEX MAGNESIUM: 4.4 MMOL/L (ref 3.5–5.1)
PRO-BNP: 4632 PG/ML (ref 0–124)
RBC # BLD: 3.34 M/UL (ref 4–5.2)
SODIUM BLD-SCNC: 138 MMOL/L (ref 136–145)
TOTAL PROTEIN: 6.5 G/DL (ref 6.4–8.2)
WBC # BLD: 8.9 K/UL (ref 4–11)

## 2020-04-27 PROCEDURE — 94799 UNLISTED PULMONARY SVC/PX: CPT

## 2020-04-27 PROCEDURE — 94150 VITAL CAPACITY TEST: CPT

## 2020-04-27 PROCEDURE — 97530 THERAPEUTIC ACTIVITIES: CPT

## 2020-04-27 PROCEDURE — 6370000000 HC RX 637 (ALT 250 FOR IP): Performed by: NURSE PRACTITIONER

## 2020-04-27 PROCEDURE — 6360000002 HC RX W HCPCS: Performed by: STUDENT IN AN ORGANIZED HEALTH CARE EDUCATION/TRAINING PROGRAM

## 2020-04-27 PROCEDURE — 6370000000 HC RX 637 (ALT 250 FOR IP): Performed by: STUDENT IN AN ORGANIZED HEALTH CARE EDUCATION/TRAINING PROGRAM

## 2020-04-27 PROCEDURE — 6370000000 HC RX 637 (ALT 250 FOR IP): Performed by: INTERNAL MEDICINE

## 2020-04-27 PROCEDURE — 2580000003 HC RX 258: Performed by: STUDENT IN AN ORGANIZED HEALTH CARE EDUCATION/TRAINING PROGRAM

## 2020-04-27 PROCEDURE — 94640 AIRWAY INHALATION TREATMENT: CPT

## 2020-04-27 PROCEDURE — 2700000000 HC OXYGEN THERAPY PER DAY

## 2020-04-27 PROCEDURE — 36415 COLL VENOUS BLD VENIPUNCTURE: CPT

## 2020-04-27 PROCEDURE — C8929 TTE W OR WO FOL WCON,DOPPLER: HCPCS

## 2020-04-27 PROCEDURE — 85025 COMPLETE CBC W/AUTO DIFF WBC: CPT

## 2020-04-27 PROCEDURE — 80053 COMPREHEN METABOLIC PANEL: CPT

## 2020-04-27 PROCEDURE — 94761 N-INVAS EAR/PLS OXIMETRY MLT: CPT

## 2020-04-27 PROCEDURE — 2580000003 HC RX 258: Performed by: INTERNAL MEDICINE

## 2020-04-27 PROCEDURE — 97165 OT EVAL LOW COMPLEX 30 MIN: CPT

## 2020-04-27 PROCEDURE — 97116 GAIT TRAINING THERAPY: CPT

## 2020-04-27 PROCEDURE — 83880 ASSAY OF NATRIURETIC PEPTIDE: CPT

## 2020-04-27 PROCEDURE — 1200000000 HC SEMI PRIVATE

## 2020-04-27 PROCEDURE — 76705 ECHO EXAM OF ABDOMEN: CPT

## 2020-04-27 RX ORDER — BISMUTH SUBSALICYLATE 262 MG/1
524 TABLET, CHEWABLE ORAL
Status: DISCONTINUED | OUTPATIENT
Start: 2020-04-27 | End: 2020-04-28 | Stop reason: HOSPADM

## 2020-04-27 RX ORDER — FUROSEMIDE 40 MG/1
40 TABLET ORAL 2 TIMES DAILY
Status: DISCONTINUED | OUTPATIENT
Start: 2020-04-27 | End: 2020-04-28 | Stop reason: HOSPADM

## 2020-04-27 RX ORDER — FUROSEMIDE 10 MG/ML
20 INJECTION INTRAMUSCULAR; INTRAVENOUS ONCE
Status: COMPLETED | OUTPATIENT
Start: 2020-04-27 | End: 2020-04-27

## 2020-04-27 RX ADMIN — FUROSEMIDE 40 MG: 40 TABLET ORAL at 20:43

## 2020-04-27 RX ADMIN — PANTOPRAZOLE SODIUM 40 MG: 40 TABLET, DELAYED RELEASE ORAL at 06:36

## 2020-04-27 RX ADMIN — FUROSEMIDE 20 MG: 10 INJECTION, SOLUTION INTRAMUSCULAR; INTRAVENOUS at 14:23

## 2020-04-27 RX ADMIN — CARVEDILOL 3.12 MG: 3.12 TABLET, FILM COATED ORAL at 08:09

## 2020-04-27 RX ADMIN — ACETAMINOPHEN 650 MG: 325 TABLET ORAL at 05:31

## 2020-04-27 RX ADMIN — METRONIDAZOLE 500 MG: 500 TABLET ORAL at 06:36

## 2020-04-27 RX ADMIN — METRONIDAZOLE 500 MG: 500 TABLET ORAL at 14:23

## 2020-04-27 RX ADMIN — BUPROPION HYDROCHLORIDE 200 MG: 100 TABLET, FILM COATED, EXTENDED RELEASE ORAL at 08:09

## 2020-04-27 RX ADMIN — ACETAMINOPHEN 650 MG: 325 TABLET ORAL at 17:39

## 2020-04-27 RX ADMIN — BISMUTH SUBSALICYLATE 524 MG: 262 TABLET, CHEWABLE ORAL at 17:34

## 2020-04-27 RX ADMIN — FLUTICASONE PROPIONATE 1 SPRAY: 50 SPRAY, METERED NASAL at 08:24

## 2020-04-27 RX ADMIN — ARIPIPRAZOLE 5 MG: 2 TABLET ORAL at 08:21

## 2020-04-27 RX ADMIN — CARVEDILOL 3.12 MG: 3.12 TABLET, FILM COATED ORAL at 17:34

## 2020-04-27 RX ADMIN — ATORVASTATIN CALCIUM 40 MG: 40 TABLET, FILM COATED ORAL at 20:43

## 2020-04-27 RX ADMIN — Medication 10 ML: at 08:10

## 2020-04-27 RX ADMIN — LEVETIRACETAM 500 MG: 500 TABLET ORAL at 08:09

## 2020-04-27 RX ADMIN — MONTELUKAST 10 MG: 10 TABLET, FILM COATED ORAL at 20:43

## 2020-04-27 RX ADMIN — METRONIDAZOLE 500 MG: 500 TABLET ORAL at 21:42

## 2020-04-27 RX ADMIN — ASPIRIN 81 MG 81 MG: 81 TABLET ORAL at 08:09

## 2020-04-27 RX ADMIN — CEFTRIAXONE 1 G: 1 INJECTION, POWDER, FOR SOLUTION INTRAMUSCULAR; INTRAVENOUS at 14:23

## 2020-04-27 RX ADMIN — BISMUTH SUBSALICYLATE 524 MG: 262 TABLET, CHEWABLE ORAL at 20:43

## 2020-04-27 RX ADMIN — LEVETIRACETAM 500 MG: 500 TABLET ORAL at 20:43

## 2020-04-27 RX ADMIN — BISMUTH SUBSALICYLATE 524 MG: 262 TABLET, CHEWABLE ORAL at 11:04

## 2020-04-27 RX ADMIN — MOMETASONE FUROATE AND FORMOTEROL FUMARATE DIHYDRATE 2 PUFF: 200; 5 AEROSOL RESPIRATORY (INHALATION) at 20:31

## 2020-04-27 RX ADMIN — Medication 10 ML: at 21:48

## 2020-04-27 RX ADMIN — MOMETASONE FUROATE AND FORMOTEROL FUMARATE DIHYDRATE 2 PUFF: 200; 5 AEROSOL RESPIRATORY (INHALATION) at 08:51

## 2020-04-27 ASSESSMENT — PAIN SCALES - GENERAL
PAINLEVEL_OUTOF10: 2
PAINLEVEL_OUTOF10: 3
PAINLEVEL_OUTOF10: 4

## 2020-04-27 ASSESSMENT — PAIN DESCRIPTION - DESCRIPTORS
DESCRIPTORS: HEADACHE
DESCRIPTORS: HEADACHE

## 2020-04-27 ASSESSMENT — PAIN DESCRIPTION - ONSET
ONSET: ON-GOING
ONSET: ON-GOING

## 2020-04-27 ASSESSMENT — PAIN DESCRIPTION - LOCATION
LOCATION: HEAD
LOCATION: HEAD

## 2020-04-27 ASSESSMENT — PAIN - FUNCTIONAL ASSESSMENT
PAIN_FUNCTIONAL_ASSESSMENT: ACTIVITIES ARE NOT PREVENTED
PAIN_FUNCTIONAL_ASSESSMENT: ACTIVITIES ARE NOT PREVENTED

## 2020-04-27 ASSESSMENT — PAIN DESCRIPTION - FREQUENCY
FREQUENCY: INTERMITTENT
FREQUENCY: INTERMITTENT

## 2020-04-27 ASSESSMENT — PAIN DESCRIPTION - ORIENTATION: ORIENTATION: RIGHT;LEFT

## 2020-04-27 ASSESSMENT — VISUAL ACUITY: OU: 1

## 2020-04-27 ASSESSMENT — PAIN DESCRIPTION - PROGRESSION
CLINICAL_PROGRESSION: NOT CHANGED
CLINICAL_PROGRESSION: NOT CHANGED

## 2020-04-27 ASSESSMENT — PAIN DESCRIPTION - PAIN TYPE
TYPE: ACUTE PAIN
TYPE: ACUTE PAIN

## 2020-04-27 NOTE — PROGRESS NOTES
significant findings as noted in HPI. Physical Exam  Vitals signs and nursing note reviewed. Constitutional:       General: She is not in acute distress. Appearance: Normal appearance. She is obese. She is not ill-appearing. HENT:      Head: Normocephalic and atraumatic. Eyes:      General: Vision grossly intact. Conjunctiva/sclera: Conjunctivae normal.   Neck:      Musculoskeletal: Full passive range of motion without pain, normal range of motion and neck supple. Cardiovascular:      Rate and Rhythm: Normal rate and regular rhythm. Pulses: Normal pulses. Heart sounds: Normal heart sounds, S1 normal and S2 normal. No murmur. No friction rub. No gallop. Pulmonary:      Effort: Pulmonary effort is normal. No respiratory distress. Breath sounds: Normal air entry. No stridor. Rales (Crackles in all lung areas ausculated) present. No wheezing. Comments: Decreased breath sounds throughout. On 1L NC. Attempt to wean off dropped pt to 84% O2 sats  Chest:      Chest wall: No tenderness. Abdominal:      General: Bowel sounds are normal. There is no distension. Palpations: Abdomen is soft. Tenderness: There is no abdominal tenderness. Musculoskeletal: Normal range of motion. Right lower leg: No edema. Left lower leg: No edema. Lymphadenopathy:      Cervical: No cervical adenopathy. Skin:     Capillary Refill: Capillary refill takes less than 2 seconds. Coloration: Skin is not pale. Neurological:      Mental Status: She is alert and oriented to person, place, and time. Mental status is at baseline. Psychiatric:         Mood and Affect: Mood normal.         Speech: Speech normal.         Thought Content:  Thought content normal.         Judgment: Judgment normal.         LABS:    CBC:   Recent Labs     04/25/20  0902 04/26/20  0509 04/27/20  0447   WBC 12.2* 10.4 8.9   HGB 10.9* 10.9* 10.9*   HCT 32.8* 34.4* 33.6*    170 196   MCV 98.6 104.6* 100.5*     Renal:    Recent Labs     04/25/20  0902 04/26/20  0509 04/27/20  0447    136 138   K 4.3 4.2 4.4   CL 98* 99 100   CO2 25 25 26   BUN 11 8 8   CREATININE 0.8 0.7 0.7   GLUCOSE 120* 121* 89   CALCIUM 9.5 9.2 9.5   ANIONGAP 14 12 12     Hepatic:   Recent Labs     04/25/20  0902 04/26/20  0509 04/27/20 0447   AST 18 38* 26   ALT 24 42* 35   BILITOT 0.5 0.6 0.7   PROT 6.6 6.3* 6.5   LABALBU 3.1* 2.8* 3.1*   ALKPHOS 177* 232* 219*     Troponin:   No results for input(s): TROPONINI in the last 72 hours. BNP: No results for input(s): BNP in the last 72 hours. Lipids: No results for input(s): CHOL, HDL in the last 72 hours. Invalid input(s): LDLCALCU, TRIGLYCERIDE  ABGs:  No results for input(s): PHART, PZM9IHZ, PO2ART, AEW1GBB, BEART, THGBART, S1YZPLHT, KUD1FNP in the last 72 hours. INR: No results for input(s): INR in the last 72 hours. Lactate: No results for input(s): LACTATE in the last 72 hours. Cultures:  -----------------------------------------------------------------  RAD:   MRI BRAIN W WO CONTRAST   Final Result      1. No acute intracranial abnormality. 2. Right parafalcine partially calcified small meningioma. 3. Remote cortical infarcts left frontal and left occipital lobes. 4. Remote lacunar infarct right caudate nucleus. 5. Mild burden T2 hyperintense white matter disease, nonspecific, likely relate to chronic microvascular ischemia. US GALLBLADDER RUQ    (Results Pending)       Assessment/Plan:   Pete Lan is a 61 y.o. female, who was presents w/ AMS after seizure and is being admitted for seizure w/o. Acute hypoxic respiratory failure secondary to multifocal bilateral pneumonia (bacterial vs viral). COVID -ve. Elevated LDH and CRP. On 1L NC now. CT chest with multifocal bilateral pneumonia mainly in RLL. Could be asp pneumonia 2/2 to recent seizure. On RA. Res panel -ve.   - Continue Antibiotics: AZT, Rocephin and Flagyl (penicillin allergy)  - F/u resp cx  - Cont IS    Acute on Chronic systolic heart failure  EF on echo 12/19 40-45%. Now on 1-2L NC. Pt has pneumonia but crackles worsening. On 40 Lasix BID at home but held due to blow BPs  - Cont home Coreg w hold parameters. - Give lasix 20mg IV once  - Start home lasix 40mg PO BID tonight     New onset seizures  Pt had a witness episode of shaking with tongue biting and urinary incontinence followed but AMS which appeared to be post ictal state. Has h/o cortical stroke by CT on admission. MRI suggestive of old strokes likely seizure focus. EEG showed focal slowing.   - Neurology following - Appreciate recs  - continue Keppra 500 mg BID    Abnormal liver enzymes  Alk phos grossly up trending since admission. Now 232. AST and ALT slightly elevated. Etiology unknown. Denies RUQ pain  - Check GGT. If elevated, RUQ US. Macrocytic Anemia. Etiology unknown. Hgb stable at 10.9 the last 2 days. .6 this AM.  - Check B12 and folate. CVA hx  - F/u Echo w/bubble, telemetry  - Cont ASA and statin   - Cont PT/OT     Acute encephalopathy-Resoled  Pt AAOx3 this AM.  - Continue monitor    Asthma:  - Continue inhalers and breathing treatments    Hypertension:  - Cont home Coreg w hold parameters.   - hold other home BP medications as BPs are still low normal.      Diabetes mellitus type 2  Glucose WNL during admission  - Carb controlled diet      Mental retardation   - Stable  - Cont home Abilify and Wellbutrin       Code Status: Full code  FEN: DIET CARB CONTROL; Safety Tray; Safety Tray (Disposables No Utensils)   PPX:       [x]Lovenox  DISPO: [x] GMF       This patient will be staffed and discussed with Ruiz Contreras MD.   ----------------------------------  Floridalma Brownlee MD, PGY-3  04/27/20  9:56 AM

## 2020-04-27 NOTE — CARE COORDINATION
Case Management Assessment           Daily Note                 Date/ Time of Note: 4/27/2020 2:46 PM         Note completed by: Haroon Lyons    Patient Name: Joy Barrera  YOB: 1960    Diagnosis:New onset seizure [facility]) [R56.9]  Patient Admission Status: Inpatient    Date of Admission:4/22/2020  7:50 PM Length of Stay: 5 GLOS:      Current Plan of Care: Admitted   From  Group Home :  MR  :  W/ AMS after  Seizure:   Acute resp Failure:  Pneumonia:  IV atbx:    New onset seizures  Neurology consulted:  MRI completed:  Echo pending   ________________________________________________________________________________________  PT AM-PAC: 19 / 24 per last evaluation on: 04/27/2020    OT AM-PAC: 21 / 24 per last evaluation on: 04/27/2020    DME Needs for discharge: walker  RW  @ D/C   ________________________________________________________________________________________  Discharge Plan: [facility] home  Return  Hen stable: Sister Mony Araiza to transport . Tentative discharge date: 4/27/-4/28/2020    Current barriers to discharge: Neurology consulted  ,  Echo and  Poss  Cardiac  Event monitor  At  D/C. Wean O 2      Referrals completed: DME: [facility]      Resources/ information provided: Not indicated at this time  ________________________________________________________________________________________  Case Management Notes:Cm  Cont to follow for  D/C planning ,  Pt to return to Group home when stable , sister to transport,  May benefit from PT OT at  D/C. Covid (-) :   Cont to wean  O2  Currently on 1 L  Nc . Jess and her family were provided with choice of provider; she and her family are in agreement with the discharge plan.     Care Transition Patient: Daksha Lyons RN  [facility]  Case Management Department  Ph: [phone]

## 2020-04-27 NOTE — PROGRESS NOTES
Time   Individual Concurrent Group Co-treatment   Time In 1100         Time Out 1125         Minutes 25           Timed Code Treatment Minutes: 25      Total Treatment Minutes:  25       Juni Proctor PT

## 2020-04-27 NOTE — PLAN OF CARE
Problem: Falls - Risk of:  Goal: Will remain free from falls  Description: Will remain free from falls  Outcome: Ongoing  Note: Fall precautions in place, call light within reach, bed alarm on, bed in lowest position, and non skid socks on. VSS. Problem: Pain:  Goal: Pain level will decrease  Description: Pain level will decrease  Outcome: Ongoing  Note: Patients pain level is being monitored. Pain scale is used to assess pain and interventions are implemented as needed.

## 2020-04-27 NOTE — PROGRESS NOTES
Patient alert and oriented to self, place and situation but disoriented to time. VSS. Patient has had complaints of headache pain and tylenol given per the mar. Patient has an episode of vomiting early in shift, pepto bismal ordered and given with meals, vomiting resolved. Patient ambulating to bathroom with front wheel walker and gait belt without issue and voiding adequately. Patient has has 3 small formed bowel movements during shift. Patient currently on 0.5 L nasal cannula. Fall precautions in place, will continue to monitor.

## 2020-04-27 NOTE — PROGRESS NOTES
Interval History:  No significant changes overnight.      Current Medications:    Current Facility-Administered Medications:     bismuth subsalicylate (PEPTO BISMOL) chewable tablet 524 mg, 524 mg, Oral, 4x Daily AC & HS, Lorraine Goldberg MD    metroNIDAZOLE (FLAGYL) tablet 500 mg, 500 mg, Oral, 3 times per day, Chase Hughes MD, 500 mg at 04/27/20 0636    perflutren lipid microspheres (DEFINITY) injection 1.65 mg, 1.5 mL, Intravenous, ONCE PRN, Toyin Black, DO    aspirin chewable tablet 81 mg, 81 mg, Oral, Daily, Toyin Black, DO, 81 mg at 04/27/20 0809    atorvastatin (LIPITOR) tablet 40 mg, 40 mg, Oral, Nightly, Toyin Black, DO, 40 mg at 04/26/20 2155    levETIRAcetam (KEPPRA) tablet 500 mg, 500 mg, Oral, BID, Armond Patches, APRN - CNP, 500 mg at 04/27/20 0809    cefTRIAXone (ROCEPHIN) 1 g IVPB in 50 mL D5W minibag, 1 g, Intravenous, Q24H, Chase Hughes MD, Stopped at 04/26/20 1519    mometasone-formoterol (DULERA) 200-5 MCG/ACT inhaler 2 puff, 2 puff, Inhalation, BID, Reda Schilder, MD, 2 puff at 04/27/20 0851    ARIPiprazole (ABILIFY) tablet 5 mg, 5 mg, Oral, Daily, Reda Schilder, MD, 5 mg at 04/27/20 0821    carvedilol (COREG) tablet 3.125 mg, 3.125 mg, Oral, BID WC, Reda Schilder, MD, 3.125 mg at 04/27/20 0809    fluticasone (FLONASE) 50 MCG/ACT nasal spray 1 spray, 1 spray, Nasal, Daily, Reda Schilder, MD, 1 spray at 04/27/20 0824    montelukast (SINGULAIR) tablet 10 mg, 10 mg, Oral, Nightly, Reda Schilder, MD, 10 mg at 04/26/20 2155    pantoprazole (PROTONIX) tablet 40 mg, 40 mg, Oral, QAM AC, Reda Schilder, MD, 40 mg at 04/27/20 0636    sodium chloride flush 0.9 % injection 10 mL, 10 mL, Intravenous, 2 times per day, Reda Schilder, MD, 10 mL at 04/27/20 0810    sodium chloride flush 0.9 % injection 10 mL, 10 mL, Intravenous, PRN, Reda Schilder, MD    acetaminophen (TYLENOL) tablet 650 mg, 650 mg, Oral, Q6H PRN, 650 mg at 04/27/20 0531 **OR** acetaminophen (TYLENOL)

## 2020-04-27 NOTE — PROGRESS NOTES
Patient pulled out IV this morning, several attempts made by several different nurses to regain IV access. Unable to succeed. Medical residents notified, awaiting orders at this time.

## 2020-04-28 VITALS
BODY MASS INDEX: 29.09 KG/M2 | SYSTOLIC BLOOD PRESSURE: 113 MMHG | WEIGHT: 148.2 LBS | DIASTOLIC BLOOD PRESSURE: 71 MMHG | RESPIRATION RATE: 18 BRPM | OXYGEN SATURATION: 94 % | HEART RATE: 97 BPM | TEMPERATURE: 97.9 F | HEIGHT: 60 IN

## 2020-04-28 LAB
ALBUMIN SERPL-MCNC: 3.3 G/DL (ref 3.4–5)
ANION GAP SERPL CALCULATED.3IONS-SCNC: 11 MMOL/L (ref 3–16)
BASOPHILS ABSOLUTE: 0.1 K/UL (ref 0–0.2)
BASOPHILS RELATIVE PERCENT: 0.6 %
BUN BLDV-MCNC: 11 MG/DL (ref 7–20)
CALCIUM SERPL-MCNC: 9.1 MG/DL (ref 8.3–10.6)
CHLORIDE BLD-SCNC: 97 MMOL/L (ref 99–110)
CO2: 27 MMOL/L (ref 21–32)
CREAT SERPL-MCNC: 0.8 MG/DL (ref 0.6–1.2)
EOSINOPHILS ABSOLUTE: 0.1 K/UL (ref 0–0.6)
EOSINOPHILS RELATIVE PERCENT: 1.5 %
GFR AFRICAN AMERICAN: >60
GFR NON-AFRICAN AMERICAN: >60
GLUCOSE BLD-MCNC: 84 MG/DL (ref 70–99)
HCT VFR BLD CALC: 33 % (ref 36–48)
HEMOGLOBIN: 10.7 G/DL (ref 12–16)
LYMPHOCYTES ABSOLUTE: 1.8 K/UL (ref 1–5.1)
LYMPHOCYTES RELATIVE PERCENT: 18.8 %
MAGNESIUM: 2 MG/DL (ref 1.8–2.4)
MCH RBC QN AUTO: 32.6 PG (ref 26–34)
MCHC RBC AUTO-ENTMCNC: 32.5 G/DL (ref 31–36)
MCV RBC AUTO: 100.1 FL (ref 80–100)
MONOCYTES ABSOLUTE: 0.6 K/UL (ref 0–1.3)
MONOCYTES RELATIVE PERCENT: 6.4 %
NEUTROPHILS ABSOLUTE: 7.1 K/UL (ref 1.7–7.7)
NEUTROPHILS RELATIVE PERCENT: 72.7 %
PDW BLD-RTO: 13.8 % (ref 12.4–15.4)
PHOSPHORUS: 3.6 MG/DL (ref 2.5–4.9)
PLATELET # BLD: 226 K/UL (ref 135–450)
PMV BLD AUTO: 8.2 FL (ref 5–10.5)
POTASSIUM SERPL-SCNC: 3.9 MMOL/L (ref 3.5–5.1)
RBC # BLD: 3.3 M/UL (ref 4–5.2)
SODIUM BLD-SCNC: 135 MMOL/L (ref 136–145)
WBC # BLD: 9.8 K/UL (ref 4–11)

## 2020-04-28 PROCEDURE — 83735 ASSAY OF MAGNESIUM: CPT

## 2020-04-28 PROCEDURE — 94680 O2 UPTK RST&XERS DIR SIMPLE: CPT

## 2020-04-28 PROCEDURE — 6370000000 HC RX 637 (ALT 250 FOR IP): Performed by: NURSE PRACTITIONER

## 2020-04-28 PROCEDURE — 97530 THERAPEUTIC ACTIVITIES: CPT

## 2020-04-28 PROCEDURE — 36415 COLL VENOUS BLD VENIPUNCTURE: CPT

## 2020-04-28 PROCEDURE — 2580000003 HC RX 258: Performed by: INTERNAL MEDICINE

## 2020-04-28 PROCEDURE — 6360000002 HC RX W HCPCS: Performed by: INTERNAL MEDICINE

## 2020-04-28 PROCEDURE — 80069 RENAL FUNCTION PANEL: CPT

## 2020-04-28 PROCEDURE — 6370000000 HC RX 637 (ALT 250 FOR IP): Performed by: INTERNAL MEDICINE

## 2020-04-28 PROCEDURE — 94640 AIRWAY INHALATION TREATMENT: CPT

## 2020-04-28 PROCEDURE — 85025 COMPLETE CBC W/AUTO DIFF WBC: CPT

## 2020-04-28 PROCEDURE — 2700000000 HC OXYGEN THERAPY PER DAY

## 2020-04-28 PROCEDURE — 97535 SELF CARE MNGMENT TRAINING: CPT

## 2020-04-28 PROCEDURE — 94150 VITAL CAPACITY TEST: CPT

## 2020-04-28 PROCEDURE — 97116 GAIT TRAINING THERAPY: CPT

## 2020-04-28 PROCEDURE — 6370000000 HC RX 637 (ALT 250 FOR IP): Performed by: STUDENT IN AN ORGANIZED HEALTH CARE EDUCATION/TRAINING PROGRAM

## 2020-04-28 PROCEDURE — 94761 N-INVAS EAR/PLS OXIMETRY MLT: CPT

## 2020-04-28 RX ORDER — CEFUROXIME AXETIL 500 MG/1
500 TABLET ORAL 2 TIMES DAILY
Qty: 6 TABLET | Refills: 0 | Status: SHIPPED | OUTPATIENT
Start: 2020-04-28 | End: 2020-05-01

## 2020-04-28 RX ORDER — ASPIRIN 81 MG/1
81 TABLET, CHEWABLE ORAL DAILY
Qty: 30 TABLET | Refills: 3 | Status: SHIPPED | OUTPATIENT
Start: 2020-04-29

## 2020-04-28 RX ORDER — LEVETIRACETAM 500 MG/1
500 TABLET ORAL 2 TIMES DAILY
Qty: 60 TABLET | Refills: 3 | Status: SHIPPED | OUTPATIENT
Start: 2020-04-28

## 2020-04-28 RX ORDER — METRONIDAZOLE 500 MG/1
500 TABLET ORAL EVERY 8 HOURS SCHEDULED
Qty: 9 TABLET | Refills: 0 | Status: SHIPPED | OUTPATIENT
Start: 2020-04-28 | End: 2020-05-01

## 2020-04-28 RX ADMIN — MOMETASONE FUROATE AND FORMOTEROL FUMARATE DIHYDRATE 2 PUFF: 200; 5 AEROSOL RESPIRATORY (INHALATION) at 10:39

## 2020-04-28 RX ADMIN — FLUTICASONE PROPIONATE 1 SPRAY: 50 SPRAY, METERED NASAL at 09:38

## 2020-04-28 RX ADMIN — Medication 10 ML: at 09:41

## 2020-04-28 RX ADMIN — ARIPIPRAZOLE 5 MG: 2 TABLET ORAL at 10:07

## 2020-04-28 RX ADMIN — CARVEDILOL 3.12 MG: 3.12 TABLET, FILM COATED ORAL at 09:38

## 2020-04-28 RX ADMIN — ACETAMINOPHEN 650 MG: 325 TABLET ORAL at 05:03

## 2020-04-28 RX ADMIN — METRONIDAZOLE 500 MG: 500 TABLET ORAL at 06:27

## 2020-04-28 RX ADMIN — ASPIRIN 81 MG 81 MG: 81 TABLET ORAL at 09:38

## 2020-04-28 RX ADMIN — FUROSEMIDE 40 MG: 40 TABLET ORAL at 09:38

## 2020-04-28 RX ADMIN — LEVETIRACETAM 500 MG: 500 TABLET ORAL at 09:38

## 2020-04-28 RX ADMIN — BUPROPION HYDROCHLORIDE 200 MG: 100 TABLET, FILM COATED, EXTENDED RELEASE ORAL at 09:38

## 2020-04-28 RX ADMIN — BISMUTH SUBSALICYLATE 524 MG: 262 TABLET, CHEWABLE ORAL at 06:27

## 2020-04-28 RX ADMIN — PANTOPRAZOLE SODIUM 40 MG: 40 TABLET, DELAYED RELEASE ORAL at 06:27

## 2020-04-28 RX ADMIN — ENOXAPARIN SODIUM 40 MG: 40 INJECTION SUBCUTANEOUS at 09:39

## 2020-04-28 ASSESSMENT — PAIN SCALES - GENERAL
PAINLEVEL_OUTOF10: 3
PAINLEVEL_OUTOF10: 0

## 2020-04-28 ASSESSMENT — PAIN - FUNCTIONAL ASSESSMENT: PAIN_FUNCTIONAL_ASSESSMENT: ACTIVITIES ARE NOT PREVENTED

## 2020-04-28 ASSESSMENT — PAIN DESCRIPTION - DESCRIPTORS: DESCRIPTORS: HEADACHE

## 2020-04-28 ASSESSMENT — PAIN DESCRIPTION - ORIENTATION: ORIENTATION: RIGHT;LEFT

## 2020-04-28 ASSESSMENT — PAIN DESCRIPTION - LOCATION: LOCATION: HEAD

## 2020-04-28 ASSESSMENT — PAIN DESCRIPTION - FREQUENCY: FREQUENCY: CONTINUOUS

## 2020-04-28 ASSESSMENT — PAIN DESCRIPTION - ONSET: ONSET: ON-GOING

## 2020-04-28 ASSESSMENT — PAIN DESCRIPTION - PAIN TYPE: TYPE: ACUTE PAIN

## 2020-04-28 ASSESSMENT — PAIN DESCRIPTION - PROGRESSION: CLINICAL_PROGRESSION: GRADUALLY WORSENING

## 2020-04-28 NOTE — PLAN OF CARE
Problem: Falls - Risk of:  Goal: Will remain free from falls  Description: Will remain free from falls  4/28/2020 0202 by Wilberto Phoenix RN  Outcome: Ongoing   Patient is at risk for falls. Patient is in bed with bed alarm on, fall risk ID, Nonskid socks, Bed in lowest position. Call light and bedside table are within reach. Patient calls out approprietly. Camera in room for safety. Will continue to monitor. Problem: Respiratory:  Goal: Ability to maintain a clear airway will improve  Description: Ability to maintain a clear airway will improve  Outcome: Ongoing   Patient has crackles in lungs, coughs spontaneously to clear her lungs.  Will continue to monitor

## 2020-04-28 NOTE — CARE COORDINATION
Case Management Assessment            Discharge Note                    Date / Time of Note: 4/28/2020 2:37 PM                  Discharge Note Completed by: Darrick Bobby     Discharge order in place and home O2 testing completed. Pt does not need home O2 at d/c. Pt's sister Michelle Sheppard prefers that she not come home with the walker since she did not use one before and she feels like she will use it to get sympathy and become reliant on it instead of working on getting stronger. She is on her way here to pick her up and asked that we have [address] come in for home care. Referral made with [facility] and faxed orders to them for start of care. Prescriptions were filled here through meds to beds. Patient Name: Edilberto Conner   YOB: 1960  Diagnosis: New onset seizure [facility]) [R56.9]   Date / Time: 4/22/2020  7:50 PM    Current PCP: Yaritza Syed patient: No    Hospitalization in the last 30 days: No    Advance Directives:  Code Status: Full Code  [place] DNR form completed and on chart: No    Financial:  Payor: MEDICARE / Plan: MEDICARE PART A AND B / Product Type: *No Product type* /      Pharmacy:    [facility] [address] [place] [address],[address]  [address] [place] [address] [address] [id number]  Phone: [phone] Fax: [phone]      Assistance purchasing medications?: Potential Assistance Purchasing Medications: No  Assistance provided by Case Management: None at this time    Does patient want to participate in local refill/ meds to beds program?: Yes    Meds To Beds General Rules:  1. Can ONLY be done Monday- Friday between 8:30am-5pm  2. Prescription(s) must be in pharmacy by 3pm to be filled same day  3. Copy of patient's insurance/ prescription drug card and patient face sheet must be sent along with the prescription(s)  4. Cost of Rx cannot be added to hospital bill.  If financial assistance is needed, please contact unit

## 2020-04-28 NOTE — PROGRESS NOTES
[facility]    Respiratory Therapy     Home Oxygen Evaluation        Name: Setphen Madrigal Record Number: [id number]  Age: 61 y.o. Gender:  female   : 1960  Today's date: 2020  Room: 55/5527-01      Assessment        /71   Pulse 97   Temp 97.9 °F (36.6 °C) (Oral)   Resp 18   Ht 5' (1.524 m)   Wt 148 lb 3.2 oz (67.2 kg)   SpO2 94%   BMI 28.94 kg/m²     Patient Active Problem List   Diagnosis    Pneumonia    HTN (hypertension)    Depression    Closed fracture of shaft of fibula    Pilon fracture    [facility]    RLL pneumonia ([facility]    Acute respiratory failure with hypoxia ([facility]       Social History:  Social History     Tobacco Use    Smoking status: Never Smoker    Smokeless tobacco: Never Used   Substance Use Topics    Alcohol use: No    Drug use: No       Patient Room Air saturation at rest 94  %  Patient Room Air saturation upon ambulation 90 %    Oxygen saturations of 88% or less on RA qualifies patient for Home Oxygen    Patient resting on 1  lmp  with an oxygen saturation of  95 %     Patient resting on 0 lpm with an oxygen saturation of 94%    Patient ambulated on 0 lpm with an oxygen saturation of 90%    In your clinical assessment does the Patient Require Portable Oxygen Tanks? No pt does not qualify for home 02.               Patient/caregiver was educated on Home Oxygen process:  yes     Level of patient/caregiver understanding able to:   [x] Verbalize understanding   [x] Demonstrate understanding       [] Teach back        [] Needs reinforcement        []  No available caregiver               []  Other:     Response to education:  Good     Time Spent with Home O2 Set Up:  10  minutes     Hayley Arreguin RCP on 2020 at 12:26 PM                 .

## 2020-04-28 NOTE — PROGRESS NOTES
Time   Individual Concurrent Group Co-treatment   Time In 1056         Time Out 1121         Minutes 25         Timed Code Treatment Minutes: 25 Minutes    Total treatment Minutes: ELIEL Hensley/EMILY

## 2020-04-28 NOTE — DISCHARGE SUMMARY
is warm and dry. Coloration: Skin is not pale. Findings: No erythema. Neurological:      General: No focal deficit present. Mental Status: She is alert and oriented to person, place, and time. Mental status is at baseline. Cranial Nerves: No cranial nerve deficit. Sensory: No sensory deficit. Motor: No weakness or abnormal muscle tone. Coordination: Coordination normal.      Deep Tendon Reflexes: Reflexes normal.   Psychiatric:         Mood and Affect: Mood normal.         Behavior: Behavior normal.          LABS:  Recent Labs     04/28/20  1144   WBC 9.8   HGB 10.7*         Recent Labs     04/28/20  1144   *   K 3.9   CL 97*   CO2 27   BUN 11   CREATININE 0.8   GLUCOSE 84     No results for input(s): INR in the last 72 hours. Significant Diagnostic Studies:    US ABDOMEN LIMITED   Final Result      1. Prior cholecystectomy. 2. Slight biliary dilatation without evidence of obstructing lesion. 3. Benign-appearing right renal cyst.            MRI BRAIN W WO CONTRAST   Final Result      1. No acute intracranial abnormality. 2. Right parafalcine partially calcified small meningioma. 3. Remote cortical infarcts left frontal and left occipital lobes. 4. Remote lacunar infarct right caudate nucleus. 5. Mild burden T2 hyperintense white matter disease, nonspecific, likely relate to chronic microvascular ischemia. Consults:     IP CONSULT TO PHARMACY  IP CONSULT TO NEUROLOGY  IP CONSULT TO CASE MANAGEMENT  IP CONSULT TO HOME CARE NEEDS    Treatments: Refer to brief hospital course.     Discharge Medications:   Maury Alcala   Home Medication Instructions KJE:[id number]    Printed on:04/28/20 3206   Medication Information                      acetaminophen (TYLENOL) 500 MG tablet  Take 1,000 mg by mouth every 6 hours as needed (headaches)             aripiprazole (ABILIFY) 5 MG tablet  Take 5 mg by mouth Daily with lunch              aspirin 81

## 2020-04-28 NOTE — PROGRESS NOTES
Multifocal bilateral pneumonia, renal lesion; COVID negative; brain MRI: (-) acute, (+) remote infarcts; PMHx: DM, CHF, mental retardation, depression, cholecystectomy, hysterectomy  Subjective  Subjective: Pt found sitting in chair. Agreeable to PT. Pain Screening  Patient Currently in Pain: Denies  Vital Signs  Patient Currently in Pain: Denies       Orientation     Cognition      Objective      Transfers  Sit to Stand: Stand by assistance(cues for hand placement - pulls up on walker when walker in front)  Stand to sit: Stand by assistance(cues for safety- backing up to chair and reaching back prior to sitting)  Ambulation  Ambulation?: Yes  Ambulation 1  Device: No Device  Assistance: Contact guard assistance  Quality of Gait: reaches out for UE support, slightly unsteady  Distance: 8'   Pt stating feeling like she was going to fall without walker - wanted to use  Comments: Pt amb 70' and 20' x 2 with rolling walker on RA with SB/supervision. Sats 87% after ambulation. Increased to low 90s after 1-2 minutes rest and cues for pursed lip breathing. Amb 150' with rolling walker on 1L O2 with SB/supervision. Pulse ox 94% after ambulation  Stairs/Curb  Stairs?: Yes(Up/down 2 steps with bilat UE support on rails CGA, step to pattern)                                 G-Code     OutComes Score                                                     AM-PAC Score  AM-PAC Inpatient Mobility Raw Score : 19 (04/28/20 0928)  AM-PAC Inpatient T-Scale Score : 45.44 (04/28/20 0928)  Mobility Inpatient CMS 0-100% Score: 41.77 (04/28/20 8508)  Mobility Inpatient CMS G-Code Modifier : CK (04/28/20 6323)          Goals  Short term goals  Time Frame for Short term goals: discharge  Short term goal 1: Pt will transfer supine <--> sit with mod I   ongoing  Short term goal 2: pt will transfer sit <--> stand with supervision   ongoing  Short term goal 3: Pt will amb 150 ft with RW and supervision   MET 4/28.   Revised goal:  Pt will amb

## 2020-05-04 ENCOUNTER — TELEPHONE (OUTPATIENT)
Dept: CARDIOLOGY CLINIC | Age: 60
End: 2020-05-04

## 2020-05-04 NOTE — PROGRESS NOTES
Attempted to call patient. No answer on phone. Patient referred for monitor to assess for AF as a cause of her recent CVA. She follows with Dr. Lobo Tsai at [facility]. Would like to know if she is going to wear a monitor through them or with us. Can we check with her and see what she wants.

## 2020-05-04 NOTE — TELEPHONE ENCOUNTER
Called patient's place of residence-[facility]. Spoke with RN at the facility. Patient has been scheduled to see her cardiologist with [facility] on Tuesday.        ----- Message from LILIA Fermin CNP sent at 5/4/2020  2:37 PM EDT -----   Call LILIA Fermin CNP.      Specialty: Certified Nurse Practitioner   Why: for placement of a heart monitor for possible paroxysmal afib   Contact information:   [address] [place] [address] No. [place]   [phone]       ----- Message -----  From: Coty Crowley MD  Sent: 4/28/2020   5:07 PM EDT  To: LILAI Fermin CNP

## 2020-11-03 PROBLEM — J18.9 RLL PNEUMONIA: Status: RESOLVED | Noted: 2020-04-24 | Resolved: 2020-11-03

## 2020-12-20 ENCOUNTER — APPOINTMENT (OUTPATIENT)
Dept: GENERAL RADIOLOGY | Age: 60
DRG: 177 | End: 2020-12-20
Payer: MEDICARE

## 2020-12-20 ENCOUNTER — HOSPITAL ENCOUNTER (INPATIENT)
Age: 60
LOS: 2 days | Discharge: HOME | DRG: 177 | End: 2020-12-22
Attending: STUDENT IN AN ORGANIZED HEALTH CARE EDUCATION/TRAINING PROGRAM | Admitting: INTERNAL MEDICINE
Payer: MEDICARE

## 2020-12-20 PROBLEM — U07.1 COVID-19: Status: ACTIVE | Noted: 2020-12-20

## 2020-12-20 PROBLEM — R62.50 DEVELOPMENTAL DELAY: Status: ACTIVE | Noted: 2020-12-20

## 2020-12-20 PROBLEM — J96.00 ACUTE RESPIRATORY FAILURE DUE TO COVID-19 (HCC): Status: ACTIVE | Noted: 2020-12-20

## 2020-12-20 PROBLEM — U07.1 ACUTE RESPIRATORY FAILURE DUE TO COVID-19 (HCC): Status: ACTIVE | Noted: 2020-12-20

## 2020-12-20 LAB
A/G RATIO: 0.8 (ref 1.1–2.2)
ABO/RH: NORMAL
ALBUMIN SERPL-MCNC: 3.3 G/DL (ref 3.4–5)
ALP BLD-CCNC: 222 U/L (ref 40–129)
ALT SERPL-CCNC: 30 U/L (ref 10–40)
ANION GAP SERPL CALCULATED.3IONS-SCNC: 9 MMOL/L (ref 3–16)
ANTIBODY SCREEN: NORMAL
AST SERPL-CCNC: 40 U/L (ref 15–37)
BASE EXCESS VENOUS: 7.6 MMOL/L (ref -3–3)
BASOPHILS ABSOLUTE: 0 K/UL (ref 0–0.2)
BASOPHILS RELATIVE PERCENT: 0.4 %
BILIRUB SERPL-MCNC: 0.3 MG/DL (ref 0–1)
BUN BLDV-MCNC: 12 MG/DL (ref 7–20)
CALCIUM SERPL-MCNC: 9.3 MG/DL (ref 8.3–10.6)
CARBOXYHEMOGLOBIN: 2.3 % (ref 0–1.5)
CHLORIDE BLD-SCNC: 92 MMOL/L (ref 99–110)
CO2: 33 MMOL/L (ref 21–32)
CREAT SERPL-MCNC: 0.9 MG/DL (ref 0.6–1.2)
D DIMER: 601 NG/ML DDU (ref 0–229)
EKG ATRIAL RATE: 82 BPM
EKG DIAGNOSIS: NORMAL
EKG P-R INTERVAL: 136 MS
EKG Q-T INTERVAL: 416 MS
EKG QRS DURATION: 102 MS
EKG QTC CALCULATION (BAZETT): 486 MS
EKG R AXIS: -23 DEGREES
EKG T AXIS: 55 DEGREES
EKG VENTRICULAR RATE: 82 BPM
EOSINOPHILS ABSOLUTE: 0 K/UL (ref 0–0.6)
EOSINOPHILS RELATIVE PERCENT: 0.2 %
GFR AFRICAN AMERICAN: >60
GFR NON-AFRICAN AMERICAN: >60
GLOBULIN: 4 G/DL
GLUCOSE BLD-MCNC: 96 MG/DL (ref 70–99)
HCO3 VENOUS: 34.6 MMOL/L (ref 23–29)
HCT VFR BLD CALC: 36.2 % (ref 36–48)
HEMOGLOBIN: 12.1 G/DL (ref 12–16)
LYMPHOCYTES ABSOLUTE: 1.2 K/UL (ref 1–5.1)
LYMPHOCYTES RELATIVE PERCENT: 31.1 %
MCH RBC QN AUTO: 31.9 PG (ref 26–34)
MCHC RBC AUTO-ENTMCNC: 33.5 G/DL (ref 31–36)
MCV RBC AUTO: 95.3 FL (ref 80–100)
METHEMOGLOBIN VENOUS: 0.3 %
MONOCYTES ABSOLUTE: 0.4 K/UL (ref 0–1.3)
MONOCYTES RELATIVE PERCENT: 10.2 %
NEUTROPHILS ABSOLUTE: 2.3 K/UL (ref 1.7–7.7)
NEUTROPHILS RELATIVE PERCENT: 58.1 %
O2 CONTENT, VEN: 14 VOL %
O2 SAT, VEN: 81 %
O2 THERAPY: ABNORMAL
PCO2, VEN: 59.7 MMHG (ref 40–50)
PDW BLD-RTO: 13.5 % (ref 12.4–15.4)
PH VENOUS: 7.38 (ref 7.35–7.45)
PLATELET # BLD: 219 K/UL (ref 135–450)
PMV BLD AUTO: 7.7 FL (ref 5–10.5)
PO2, VEN: 47 MMHG (ref 25–40)
POTASSIUM REFLEX MAGNESIUM: 3.9 MMOL/L (ref 3.5–5.1)
PRO-BNP: 1017 PG/ML (ref 0–124)
PROCALCITONIN: 0.14 NG/ML (ref 0–0.15)
RBC # BLD: 3.8 M/UL (ref 4–5.2)
SARS-COV-2, NAAT: DETECTED
SODIUM BLD-SCNC: 134 MMOL/L (ref 136–145)
TCO2 CALC VENOUS: 36 MMOL/L
TOTAL PROTEIN: 7.3 G/DL (ref 6.4–8.2)
TROPONIN: <0.01 NG/ML
WBC # BLD: 3.9 K/UL (ref 4–11)

## 2020-12-20 PROCEDURE — 84145 PROCALCITONIN (PCT): CPT

## 2020-12-20 PROCEDURE — 2700000000 HC OXYGEN THERAPY PER DAY

## 2020-12-20 PROCEDURE — 2580000003 HC RX 258: Performed by: INTERNAL MEDICINE

## 2020-12-20 PROCEDURE — XW13325 TRANSFUSION OF CONVALESCENT PLASMA (NONAUTOLOGOUS) INTO PERIPHERAL VEIN, PERCUTANEOUS APPROACH, NEW TECHNOLOGY GROUP 5: ICD-10-PCS | Performed by: INTERNAL MEDICINE

## 2020-12-20 PROCEDURE — 82803 BLOOD GASES ANY COMBINATION: CPT

## 2020-12-20 PROCEDURE — 6370000000 HC RX 637 (ALT 250 FOR IP): Performed by: PHYSICIAN ASSISTANT

## 2020-12-20 PROCEDURE — U0002 COVID-19 LAB TEST NON-CDC: HCPCS

## 2020-12-20 PROCEDURE — 36415 COLL VENOUS BLD VENIPUNCTURE: CPT

## 2020-12-20 PROCEDURE — 2500000003 HC RX 250 WO HCPCS: Performed by: INTERNAL MEDICINE

## 2020-12-20 PROCEDURE — 85379 FIBRIN DEGRADATION QUANT: CPT

## 2020-12-20 PROCEDURE — 86850 RBC ANTIBODY SCREEN: CPT

## 2020-12-20 PROCEDURE — 94640 AIRWAY INHALATION TREATMENT: CPT

## 2020-12-20 PROCEDURE — 86901 BLOOD TYPING SEROLOGIC RH(D): CPT

## 2020-12-20 PROCEDURE — 99284 EMERGENCY DEPT VISIT MOD MDM: CPT

## 2020-12-20 PROCEDURE — 6360000002 HC RX W HCPCS: Performed by: PHYSICIAN ASSISTANT

## 2020-12-20 PROCEDURE — 99223 1ST HOSP IP/OBS HIGH 75: CPT | Performed by: INTERNAL MEDICINE

## 2020-12-20 PROCEDURE — 93005 ELECTROCARDIOGRAM TRACING: CPT | Performed by: STUDENT IN AN ORGANIZED HEALTH CARE EDUCATION/TRAINING PROGRAM

## 2020-12-20 PROCEDURE — 6370000000 HC RX 637 (ALT 250 FOR IP): Performed by: STUDENT IN AN ORGANIZED HEALTH CARE EDUCATION/TRAINING PROGRAM

## 2020-12-20 PROCEDURE — XW033E5 INTRODUCTION OF REMDESIVIR ANTI-INFECTIVE INTO PERIPHERAL VEIN, PERCUTANEOUS APPROACH, NEW TECHNOLOGY GROUP 5: ICD-10-PCS | Performed by: INTERNAL MEDICINE

## 2020-12-20 PROCEDURE — 84484 ASSAY OF TROPONIN QUANT: CPT

## 2020-12-20 PROCEDURE — 85025 COMPLETE CBC W/AUTO DIFF WBC: CPT

## 2020-12-20 PROCEDURE — 86900 BLOOD TYPING SEROLOGIC ABO: CPT

## 2020-12-20 PROCEDURE — 80053 COMPREHEN METABOLIC PANEL: CPT

## 2020-12-20 PROCEDURE — 6360000002 HC RX W HCPCS: Performed by: STUDENT IN AN ORGANIZED HEALTH CARE EDUCATION/TRAINING PROGRAM

## 2020-12-20 PROCEDURE — 71045 X-RAY EXAM CHEST 1 VIEW: CPT

## 2020-12-20 PROCEDURE — 93010 ELECTROCARDIOGRAM REPORT: CPT | Performed by: INTERNAL MEDICINE

## 2020-12-20 PROCEDURE — 1200000000 HC SEMI PRIVATE

## 2020-12-20 PROCEDURE — 2580000003 HC RX 258: Performed by: PHYSICIAN ASSISTANT

## 2020-12-20 PROCEDURE — 83880 ASSAY OF NATRIURETIC PEPTIDE: CPT

## 2020-12-20 RX ORDER — 0.9 % SODIUM CHLORIDE 0.9 %
30 INTRAVENOUS SOLUTION INTRAVENOUS PRN
Status: DISCONTINUED | OUTPATIENT
Start: 2020-12-20 | End: 2020-12-22 | Stop reason: HOSPADM

## 2020-12-20 RX ORDER — LEVETIRACETAM 500 MG/1
500 TABLET ORAL 2 TIMES DAILY
Status: DISCONTINUED | OUTPATIENT
Start: 2020-12-20 | End: 2020-12-22 | Stop reason: HOSPADM

## 2020-12-20 RX ORDER — ACETAMINOPHEN 325 MG/1
650 TABLET ORAL EVERY 6 HOURS PRN
Status: DISCONTINUED | OUTPATIENT
Start: 2020-12-20 | End: 2020-12-22 | Stop reason: HOSPADM

## 2020-12-20 RX ORDER — MONTELUKAST SODIUM 10 MG/1
10 TABLET ORAL NIGHTLY
Status: DISCONTINUED | OUTPATIENT
Start: 2020-12-20 | End: 2020-12-22 | Stop reason: HOSPADM

## 2020-12-20 RX ORDER — ARIPIPRAZOLE 10 MG/1
5 TABLET ORAL
Status: DISCONTINUED | OUTPATIENT
Start: 2020-12-20 | End: 2020-12-22 | Stop reason: HOSPADM

## 2020-12-20 RX ORDER — GUAIFENESIN/DEXTROMETHORPHAN 100-10MG/5
5 SYRUP ORAL EVERY 4 HOURS PRN
Status: DISCONTINUED | OUTPATIENT
Start: 2020-12-20 | End: 2020-12-22 | Stop reason: HOSPADM

## 2020-12-20 RX ORDER — FUROSEMIDE 40 MG/1
40 TABLET ORAL 2 TIMES DAILY
Status: DISCONTINUED | OUTPATIENT
Start: 2020-12-20 | End: 2020-12-22 | Stop reason: HOSPADM

## 2020-12-20 RX ORDER — POLYETHYLENE GLYCOL 3350 17 G/17G
17 POWDER, FOR SOLUTION ORAL DAILY PRN
Status: DISCONTINUED | OUTPATIENT
Start: 2020-12-20 | End: 2020-12-22 | Stop reason: HOSPADM

## 2020-12-20 RX ORDER — SODIUM CHLORIDE 0.9 % (FLUSH) 0.9 %
10 SYRINGE (ML) INJECTION PRN
Status: DISCONTINUED | OUTPATIENT
Start: 2020-12-20 | End: 2020-12-22 | Stop reason: HOSPADM

## 2020-12-20 RX ORDER — DOXYCYCLINE HYCLATE 100 MG
100 TABLET ORAL ONCE
Status: COMPLETED | OUTPATIENT
Start: 2020-12-20 | End: 2020-12-20

## 2020-12-20 RX ORDER — METOCLOPRAMIDE 10 MG/1
5 TABLET ORAL
Status: DISCONTINUED | OUTPATIENT
Start: 2020-12-20 | End: 2020-12-22 | Stop reason: HOSPADM

## 2020-12-20 RX ORDER — BUPROPION HYDROCHLORIDE 100 MG/1
200 TABLET, EXTENDED RELEASE ORAL DAILY
Status: DISCONTINUED | OUTPATIENT
Start: 2020-12-20 | End: 2020-12-22 | Stop reason: HOSPADM

## 2020-12-20 RX ORDER — CLOMIPRAMINE HYDROCHLORIDE 25 MG/1
150 CAPSULE ORAL NIGHTLY
Status: DISCONTINUED | OUTPATIENT
Start: 2020-12-20 | End: 2020-12-22 | Stop reason: HOSPADM

## 2020-12-20 RX ORDER — GUAIFENESIN 600 MG/1
1200 TABLET, EXTENDED RELEASE ORAL 2 TIMES DAILY PRN
Status: DISCONTINUED | OUTPATIENT
Start: 2020-12-20 | End: 2020-12-22 | Stop reason: HOSPADM

## 2020-12-20 RX ORDER — 0.9 % SODIUM CHLORIDE 0.9 %
20 INTRAVENOUS SOLUTION INTRAVENOUS ONCE
Status: COMPLETED | OUTPATIENT
Start: 2020-12-20 | End: 2020-12-21

## 2020-12-20 RX ORDER — PANTOPRAZOLE SODIUM 40 MG/1
40 TABLET, DELAYED RELEASE ORAL
Status: DISCONTINUED | OUTPATIENT
Start: 2020-12-21 | End: 2020-12-22 | Stop reason: HOSPADM

## 2020-12-20 RX ORDER — ONDANSETRON 2 MG/ML
4 INJECTION INTRAMUSCULAR; INTRAVENOUS EVERY 6 HOURS PRN
Status: DISCONTINUED | OUTPATIENT
Start: 2020-12-20 | End: 2020-12-22 | Stop reason: HOSPADM

## 2020-12-20 RX ORDER — BUDESONIDE AND FORMOTEROL FUMARATE DIHYDRATE 160; 4.5 UG/1; UG/1
2 AEROSOL RESPIRATORY (INHALATION) 2 TIMES DAILY
Status: DISCONTINUED | OUTPATIENT
Start: 2020-12-20 | End: 2020-12-22 | Stop reason: HOSPADM

## 2020-12-20 RX ORDER — PROMETHAZINE HYDROCHLORIDE 25 MG/1
12.5 TABLET ORAL EVERY 6 HOURS PRN
Status: DISCONTINUED | OUTPATIENT
Start: 2020-12-20 | End: 2020-12-22 | Stop reason: HOSPADM

## 2020-12-20 RX ORDER — CARVEDILOL 3.12 MG/1
3.12 TABLET ORAL 2 TIMES DAILY WITH MEALS
Status: DISCONTINUED | OUTPATIENT
Start: 2020-12-20 | End: 2020-12-22 | Stop reason: HOSPADM

## 2020-12-20 RX ORDER — ASPIRIN 81 MG/1
81 TABLET, CHEWABLE ORAL DAILY
Status: DISCONTINUED | OUTPATIENT
Start: 2020-12-20 | End: 2020-12-22 | Stop reason: HOSPADM

## 2020-12-20 RX ORDER — ATORVASTATIN CALCIUM 10 MG/1
10 TABLET, FILM COATED ORAL NIGHTLY
Status: DISCONTINUED | OUTPATIENT
Start: 2020-12-20 | End: 2020-12-22 | Stop reason: HOSPADM

## 2020-12-20 RX ORDER — ACETAMINOPHEN 650 MG/1
650 SUPPOSITORY RECTAL EVERY 6 HOURS PRN
Status: DISCONTINUED | OUTPATIENT
Start: 2020-12-20 | End: 2020-12-22 | Stop reason: HOSPADM

## 2020-12-20 RX ORDER — DOXYCYCLINE HYCLATE 100 MG
100 TABLET ORAL 2 TIMES DAILY
Qty: 10 TABLET | Refills: 0 | Status: SHIPPED | OUTPATIENT
Start: 2020-12-20 | End: 2020-12-22 | Stop reason: HOSPADM

## 2020-12-20 RX ORDER — SODIUM CHLORIDE 0.9 % (FLUSH) 0.9 %
10 SYRINGE (ML) INJECTION EVERY 12 HOURS SCHEDULED
Status: DISCONTINUED | OUTPATIENT
Start: 2020-12-20 | End: 2020-12-22 | Stop reason: HOSPADM

## 2020-12-20 RX ORDER — DEXAMETHASONE SODIUM PHOSPHATE 10 MG/ML
6 INJECTION, SOLUTION INTRAMUSCULAR; INTRAVENOUS ONCE
Status: COMPLETED | OUTPATIENT
Start: 2020-12-20 | End: 2020-12-20

## 2020-12-20 RX ADMIN — ATORVASTATIN CALCIUM 10 MG: 10 TABLET, FILM COATED ORAL at 22:03

## 2020-12-20 RX ADMIN — REMDESIVIR 200 MG: 100 INJECTION, POWDER, LYOPHILIZED, FOR SOLUTION INTRAVENOUS at 15:06

## 2020-12-20 RX ADMIN — Medication 10 ML: at 22:04

## 2020-12-20 RX ADMIN — CARVEDILOL 3.12 MG: 3.12 TABLET, FILM COATED ORAL at 17:05

## 2020-12-20 RX ADMIN — MONTELUKAST SODIUM 10 MG: 10 TABLET, COATED ORAL at 22:03

## 2020-12-20 RX ADMIN — DOXYCYCLINE HYCLATE 100 MG: 100 TABLET, COATED ORAL at 12:26

## 2020-12-20 RX ADMIN — METOCLOPRAMIDE 5 MG: 10 TABLET ORAL at 17:05

## 2020-12-20 RX ADMIN — ASPIRIN 81 MG: 81 TABLET, CHEWABLE ORAL at 15:05

## 2020-12-20 RX ADMIN — SODIUM CHLORIDE 30 ML: 9 INJECTION, SOLUTION INTRAVENOUS at 15:06

## 2020-12-20 RX ADMIN — Medication 2 PUFF: at 19:10

## 2020-12-20 RX ADMIN — BUPROPION HYDROCHLORIDE 200 MG: 100 TABLET, EXTENDED RELEASE ORAL at 15:05

## 2020-12-20 RX ADMIN — DEXAMETHASONE SODIUM PHOSPHATE 6 MG: 10 INJECTION, SOLUTION INTRAMUSCULAR; INTRAVENOUS at 12:26

## 2020-12-20 RX ADMIN — METOCLOPRAMIDE 5 MG: 10 TABLET ORAL at 22:03

## 2020-12-20 RX ADMIN — ARIPIPRAZOLE 5 MG: 10 TABLET ORAL at 15:05

## 2020-12-20 RX ADMIN — LEVETIRACETAM 500 MG: 500 TABLET ORAL at 22:03

## 2020-12-20 RX ADMIN — Medication 10 ML: at 15:08

## 2020-12-20 RX ADMIN — FUROSEMIDE 40 MG: 40 TABLET ORAL at 17:05

## 2020-12-20 RX ADMIN — LEVETIRACETAM 500 MG: 500 TABLET ORAL at 15:14

## 2020-12-20 NOTE — CONSULTS
TELEHEALTH EVALUATION: Service performed was Audio/Visual (During HXXCB-67 public health emergency)  using [email or web address] device with a stethoscope capability      Patient is being seen at the request of FLORENTINO Bowden  for a consultation for COVID-19 and hypoxia    HISTORY OF PRESENT ILLNESS:   61years old with history of diabetes, hypertension, developmental delay presented from group home with shortness of breath for few days. Recent Covid exposure. Patient found to be hypoxemic in ED, saturation in the upper 80s. Cough with yellow sputum. No fever. Patient with underlying MRDD poor historian not able to obtain further HPI. PAST MEDICAL HISTORY:  Past Medical History:   Diagnosis Date    CHF (congestive heart failure) ([address]     Depression     Diabetes     Fractures     Hyperthyroidism     Mental retardation      PAST SURGICAL HISTORY:  Past Surgical History:   Procedure Laterality Date    CHOLECYSTECTOMY      HYSTERECTOMY         FAMILY HISTORY:  family history includes Breast Cancer in her sister; Cervical Cancer in her sister; [employer] in her father; Ovarian Cancer in her mother. SOCIAL HISTORY:   reports that she has never smoked.  She has never used smokeless tobacco.    Scheduled Meds:   ARIPiprazole  5 mg Oral Lunch    aspirin  81 mg Oral Daily    atorvastatin  10 mg Oral Nightly    budesonide-formoterol  2 puff Inhalation BID    buPROPion  200 mg Oral Daily    carvedilol  3.125 mg Oral BID WC    clomiPRAMINE  150 mg Oral Nightly    levETIRAcetam  500 mg Oral BID    montelukast  10 mg Oral Nightly    metoclopramide  5 mg Oral 4x Daily AC & HS    [START ON 12/21/2020] pantoprazole  40 mg Oral QAM AC    furosemide  40 mg Oral BID    dexamethasone  6 mg Oral Daily    sodium chloride flush  10 mL Intravenous 2 times per day    enoxaparin  40 mg Subcutaneous Daily    [START ON 12/21/2020] remdesivir IVPB  100 mg Intravenous Q24H    sodium chloride  20 mL Intravenous Once    influenza virus vaccine  0.5 mL Intramuscular Prior to discharge     Continuous Infusions:    PRN Meds:  guaiFENesin, acetaminophen **OR** acetaminophen, guaiFENesin-dextromethorphan, sodium chloride flush, promethazine **OR** ondansetron, polyethylene glycol, sodium chloride    ALLERGIES:  Patient is allergic to penicillins. REVIEW OF SYSTEMS: Poor unreliable historian limited to obtain  Constitutional: +fever  HENT: Negative for sore throat  Eyes: Negative for redness   Respiratory: + dyspnea, cough  Cardiovascular: Negative for chest pain  Gastrointestinal: Negative for vomiting, diarrhea   Genitourinary: Negative for hematuria   Musculoskeletal: + arthralgias   Skin: Negative for rash  Neurological: Negative for syncope  Hematological: Negative for adenopathy  Psychiatric/Behavorial: Negative for anxiety    PHYSICAL EXAM:  /84   Pulse 91   Temp 97.6 °F (36.4 °C) (Oral)   Resp 18   Ht 5' (1.524 m)   Wt 149 lb 8 oz (67.8 kg)   SpO2 99%   BMI 29.20 kg/m²  on 2 LPM   Gen/Constitutional: + distress. Appears well-developed and nourished  Eyes: No sclera icterus. EOM intact- L eye blindness. No conjunctival injection. No visible discharge  HENT: Head is normocephalic and atraumatic. Mucus membranes are moist and the tongue appears normal. No discharge. Pharynx clear. Normal appearing nose. External Ears normal.    Neck: Trachea midline. No obvious mass. Resp: + accessory muscle use.  + crackles. No wheezes. No rhonchi. CV: Regular rate. Regular rhythm. No murmur or rub. No LE edema. GI: Non-distended. No hernia. + BS   Skin: No significant exanthematous lesions or discoloration noted on facial skin. No nodule on exposed extremities. M/S: No cyanosis. No joint deformity. No clubbing. Normal range of motion in the neck. Neuro: Awake. Alert. Moves all four extremities. Following commands. No facial asymmetry. No gaze palsy. Psych: No agitation. No hallucinations. Oriented x 1.  Mild anxiety. LABS:  CBC:   Recent Labs     12/20/20  0951   WBC 3.9*   HGB 12.1   HCT 36.2   MCV 95.3        BMP:   Recent Labs     12/20/20  0951   *   K 3.9   CL 92*   CO2 33*   BUN 12   CREATININE 0.9     LIVER PROFILE:   Recent Labs     12/20/20  0951   AST 40*   ALT 30   BILITOT 0.3   ALKPHOS 222*     PT/INR: No results for input(s): PROTIME, INR in the last 72 hours. APTT: No results for input(s): APTT in the last 72 hours. UA:No results for input(s): NITRITE, COLORU, PHUR, LABCAST, WBCUA, RBCUA, MUCUS, TRICHOMONAS, YEAST, BACTERIA, CLARITYU, SPECGRAV, LEUKOCYTESUR, UROBILINOGEN, BILIRUBINUR, BLOODU, GLUCOSEU, AMORPHOUS in the last 72 hours. Invalid input(s): KETONESU  No results for input(s): PHART, [id number], PO2ART in the last 72 hours. Chest x-ray 12/20 imaging was reviewed by me and showed   Bilateral airspace disease, predominantly lower lobe    ASSESSMENT:  · Acute hypoxemic respite failure  · COVID-19 viral pneumonia  · Multifocal pneumonia  · Leukopenia  · Developmental delay    PLAN:  Supplemental oxygen to maintain SaO2 >92%; wean as tolerated  Droplet plus isolation (surgical mask, eye protection, gown, glove)  IV antibiotics to include Zithromax  Blood culture and sputum culture  Moved to negative pressure room in case needs aerosolized procedure  Avoid NEBs as able-albuterol MDI strongly preferred   Dexamethasone 6 mg for 10 days or until discharge, whichever is shorter  Remdesivir 200 mg IV on day 1 followed by 100 mg daily for 5 days. Monitor LFTs, renal and CBC daily while on medication. 1 units of convalescence plasma  Lovenox BID         Jess Duffy is a 61 y.o. female being evaluated by a Virtual Visit (video visit) using [employer] robot with stethoscope capability to address concerns as mentioned above. A caregiver was present when appropriate.  Due to this being a TeleHealth encounter (During BOXAP-23 public health emergency), evaluation of the following organ systems was limited: Vitals/Constitutional/EENT/Resp/CV/GI//MS/Neuro/Skin/Heme-Lymph-Imm. Pursuant to the emergency declaration under the [address] [place] [address] and the [employer] Act, this Virtual Visit was conducted with patient's (and/or legal guardian's) consent, to reduce the patient's risk of exposure to COVID-19 and provide necessary medical care. Services were provided through a video synchronous discussion virtually to substitute for in-person clinic visit. --Moe Whittaker MD on 12/20/2020 at 4:46 PM    An electronic signature was used to authenticate this note.

## 2020-12-20 NOTE — ED NOTES
1 - Perfect served [place] [id number] for admission     Myranda Pat  12/20/20 [facility] called back.       Myranda Pat  12/20/20 1980

## 2020-12-20 NOTE — PROGRESS NOTES
Consult has been called to Dr. Ayesha Newberry on 12/20/2020. Spoke with Osvaldo Farmer.  3:41 PM    Deisi Zazueta  12/20/2020

## 2020-12-20 NOTE — ED NOTES
Per provider Dr. Mohinder Neri, RN removed O2 NC and allowed pt to remain on RA. Pt dropped to 93% lowest O2 on RA.       Marissa Hughes RN  12/20/20 2805

## 2020-12-20 NOTE — FLOWSHEET NOTE
12/20/20 1328   Vital Signs   Temp 97.6 °F (36.4 °C)   Temp Source Oral   Pulse 91   Heart Rate Source Monitor   Resp 20   /84   BP Location Left upper arm   Level of Consciousness Alert (0)   MEWS Score 1   Height and Weight   Height 5' (1.524 m)   Weight 149 lb 8 oz (67.8 kg)   Weight Method Standing scale   BSA (Calculated - sq m) 1.69 sq meters   BMI (Calculated) 29.3   Oxygen Therapy   SpO2 98 %   O2 Device Nasal cannula   O2 Flow Rate (L/min) 2 L/min      Pt admitted to 2w, pt alert, oriented to person and place, pt oriented to room/ call light. Pt assisted up to chair, general lunch tray provided, call light within reach. Pt denies additional needs at this time. William Watson RN\

## 2020-12-20 NOTE — ED PROVIDER NOTES
[address] ED      CHIEF COMPLAINT  Shortness of Breath (EMS reports that pt SOB at [facility] home; pt wears 2L NC at home, @ 99% on 2L now )     HISTORY OF PRESENT Aline Garza is a 61 y.o. female  who presents to the ED complaining of shortness of breath. Patient has a history of developmental delay, presents from a group home with concerns for shortness of breath and possible Covid exposure. Patient is unable to contribute to the history. She denies any pain. She does not when asked if she is short of breath. Her sister did arrive at bedside who is her legal guardian, states that she has not been feeling well for the past few days and they put her on oxygen for comfort at her group home. She does not normally have an oxygen requirement. The legal guardian did give us permission to obtain laboratory work-up on her. No other complaints, modifying factors or associated symptoms. I have reviewed the following from the nursing documentation.     Past Medical History:   Diagnosis Date    CHF (congestive heart failure) (HCC)     Depression     Diabetes     Fractures     Hyperthyroidism     Mental retardation      Past Surgical History:   Procedure Laterality Date    CHOLECYSTECTOMY      HYSTERECTOMY       Family History   Problem Relation Age of Onset    Ovarian Cancer Mother     Lung Cancer Father     Breast Cancer Sister     Cervical Cancer Sister      Social History     Socioeconomic History    Marital status: Single     Spouse name: Not on file    Number of children: Not on file    Years of education: Not on file    Highest education level: Not on file   Occupational History    Not on file   Social Needs    Financial resource strain: Not on file    Food insecurity     Worry: Not on file     Inability: Not on file    Transportation needs     Medical: Not on file     Non-medical: Not on file   Tobacco Use    Smoking status: Never Smoker    Smokeless tobacco: Never Used   Substance and Sexual Activity    Alcohol use: No    Drug use: No    Sexual activity: Never   Lifestyle    Physical activity     Days per week: Not on file     Minutes per session: Not on file    Stress: Not on file   Relationships    Social connections     Talks on phone: Not on file     Gets together: Not on file     Attends [employer] service: Not on file     Active member of club or organization: Not on file     Attends meetings of clubs or organizations: Not on file     Relationship status: Not on file    Intimate partner violence     Fear of current or ex partner: Not on file     Emotionally abused: Not on file     Physically abused: Not on file     Forced sexual activity: Not on file   Other Topics Concern    Not on file   Social History Narrative    Not on file     No current facility-administered medications for this encounter.       Current Outpatient Medications   Medication Sig Dispense Refill    doxycycline hyclate (VIBRA-TABS) 100 MG tablet Take 1 tablet by mouth 2 times daily for 5 days 10 tablet 0    levETIRAcetam (KEPPRA) 500 MG tablet Take 1 tablet by mouth 2 times daily 60 tablet 3    aspirin 81 MG chewable tablet Take 1 tablet by mouth daily 30 tablet 3    magnesium oxide (MAG-OX) 400 MG tablet Take 400 mg by mouth daily      vitamin D (CHOLECALCIFEROL) 25 MCG (1000 UT) TABS tablet Take 2,000 Units by mouth daily      diphenhydrAMINE (BENADRYL) 25 MG tablet Take 25 mg by mouth every 8 hours as needed for Itching or Allergies      guaiFENesin (MUCINEX) 600 MG extended release tablet Take 1,200 mg by mouth 2 times daily as needed for Congestion      loratadine (CLARITIN) 10 MG tablet Take 10 mg by mouth daily as needed (allergies)      budesonide-formoterol (SYMBICORT) 160-4.5 MCG/ACT AERO Inhale 2 puffs into the lungs 2 times daily      acetaminophen (TYLENOL) 500 MG tablet Take 1,000 mg by mouth every 6 hours as needed (headaches)      hydrocortisone 1 % cream Apply Compartments soft. No deformity. No tenderness in the extremities. All extremities neurovascularly intact. SKIN: Warm and dry. No acute rashes. NEUROLOGICAL: Alert and cooperative. MRDD, at baseline per sister. Moves all extremities spontaneously. No facial droop. PSYCHIATRIC: At baseline per sister    LABS  I have reviewed all labs for this visit.    Results for orders placed or performed during the hospital encounter of 12/20/20   CBC Auto Differential   Result Value Ref Range    WBC 3.9 (L) 4.0 - 11.0 K/uL    RBC 3.80 (L) 4.00 - 5.20 M/uL    Hemoglobin 12.1 12.0 - 16.0 g/dL    Hematocrit 36.2 36.0 - 48.0 %    MCV 95.3 80.0 - 100.0 fL    MCH 31.9 26.0 - 34.0 pg    MCHC 33.5 31.0 - 36.0 g/dL    RDW 13.5 12.4 - 15.4 %    Platelets 637 215 - 795 K/uL    MPV 7.7 5.0 - 10.5 fL    Neutrophils % 58.1 %    Lymphocytes % 31.1 %    Monocytes % 10.2 %    Eosinophils % 0.2 %    Basophils % 0.4 %    Neutrophils Absolute 2.3 1.7 - 7.7 K/uL    Lymphocytes Absolute 1.2 1.0 - 5.1 K/uL    Monocytes Absolute 0.4 0.0 - 1.3 K/uL    Eosinophils Absolute 0.0 0.0 - 0.6 K/uL    Basophils Absolute 0.0 0.0 - 0.2 K/uL   Comprehensive Metabolic Panel w/ Reflex to MG   Result Value Ref Range    Sodium 134 (L) 136 - 145 mmol/L    Potassium reflex Magnesium 3.9 3.5 - 5.1 mmol/L    Chloride 92 (L) 99 - 110 mmol/L    CO2 33 (H) 21 - 32 mmol/L    Anion Gap 9 3 - 16    Glucose 96 70 - 99 mg/dL    BUN 12 7 - 20 mg/dL    CREATININE 0.9 0.6 - 1.2 mg/dL    GFR Non-African American >60 >60    GFR African American >60 >60    Calcium 9.3 8.3 - 10.6 mg/dL    Total Protein 7.3 6.4 - 8.2 g/dL    Alb 3.3 (L) 3.4 - 5.0 g/dL    Albumin/Globulin Ratio 0.8 (L) 1.1 - 2.2    Total Bilirubin 0.3 0.0 - 1.0 mg/dL    Alkaline Phosphatase 222 (H) 40 - 129 U/L    ALT 30 10 - 40 U/L    AST 40 (H) 15 - 37 U/L    Globulin 4.0 g/dL   Troponin   Result Value Ref Range    Troponin <0.01 <0.01 ng/mL   Brain Natriuretic Peptide   Result Value Ref Range    Pro-BNP 1,017

## 2020-12-20 NOTE — ACP (ADVANCE CARE PLANNING)
Advance Care Planning   Healthcare Decision Maker:    Primary Decision Maker: [facility] - Legal Guardian - [phone]  NEXT OF KIN

## 2020-12-20 NOTE — ED NOTES
Pt O2 dropped to 88-89% on RA. Provider Dr. Bryce Miranda aware. RN returned pt to 2L NC. Pt resting quietly in bed at this time.       Nemo Hodge RN  12/20/20 5818

## 2020-12-20 NOTE — CARE COORDINATION
Case Management Assessment  Initial Evaluation      Patient Name: Giorgio Ayala  YOB: 1960  Diagnosis: TPNLE-97 [U07.1]  Date / Time: 12/20/2020  9:17 AM    Admission status/Date:  12/20/2020  Chart Reviewed: Yes      Patient Interviewed: No -MRDD  Family Interviewed:  Sofia Muir    Hospitalization in the last 30 days:  No      Health Care Decision Maker :   Primary Decision Maker: Sebastian Correa  [phone]    (CM - must 1st enter selection under Navigator - emergency contact- [place] Relationship and pick relationship)   Who do you trust or have selected to make healthcare decisions for you      Met with: Sister/LG Tara Velasco conducted  (bedside/phone): phone    Current PCP: Ghazala Latham MD    Financial  Medicaid  Precert required for SNF :      3 night stay required    ADLS  Support Systems/Care Needs:    Transportation: Group Home    Meal Preparation: [facility]    Housing  Living Arrangements: [place] [facility]  Steps:   Intent for return to present living arrangements: Yes  Identified Issues: [address] [facility] [address] with [address] : Yes - [facility] in the past (SN/PT/OT)     Passport/Waiver : No  :                      Phone Number:    Passport/Waiver Services: NA              Home O2 Use : No- +C19 CM following for new home O2 needs r/t C19. Community Service Affiliation  Dialysis:  No    · Agency:  · Location:  · Dialysis Schedule:  · Phone:   · Fax: Other Community Services: (ex:PT/OT,Mental Health,Wound Clinic, Cardio/Pul [address])    DISCHARGE PLAN: Explained Case Management role/services. Chart reviewed. [facility] Lives w/ one other resident and has 24/7 supervision. Per her sister and LG [employer] pt will return to [facility] and if needed can have [place] [phone] w/ [facility]. CM following for possible new home O2 r/t C19.

## 2020-12-20 NOTE — H&P
History and Physical      Chief Complaint   Patient presents with    Shortness of Breath     EMS reports that pt SOB at [facility] home; pt wears 2L NC at home, @ 99% on 2L now        HISTORY OF PRESENT ILLNESS:     Patient is a 20-year-old white female with developmental delay. She lives in a group home. She is short of breath in a group home. There is also possible Covid exposure. Patient sister is a legal guardian. She did present to the emergency room. Apparently patient not been feeling well for the last few days. They are to put her on oxygen. She is not on oxygen at baseline. Work-up in the emergency room showed acute respiratory failure from COVID-19. She is admitted to hospital.  She did drop her oxygen saturation to 88% on room air in the ER. Patient is allergic to penicillins.     Past Medical History:   Diagnosis Date    CHF (congestive heart failure) (HCC)     Depression     Diabetes     Fractures     Hyperthyroidism     Mental retardation        Past Surgical History:   Procedure Laterality Date    CHOLECYSTECTOMY      HYSTERECTOMY         Scheduled Meds:   ARIPiprazole  5 mg Oral Lunch    aspirin  81 mg Oral Daily    atorvastatin  10 mg Oral Nightly    budesonide-formoterol  2 puff Inhalation BID    buPROPion  200 mg Oral Daily    carvedilol  3.125 mg Oral BID WC    clomiPRAMINE  150 mg Oral Nightly    levETIRAcetam  500 mg Oral BID    montelukast  10 mg Oral Nightly    metoclopramide  5 mg Oral 4x Daily AC & HS    [START ON 12/21/2020] pantoprazole  40 mg Oral QAM AC    furosemide  40 mg Oral BID    dexamethasone  6 mg Oral Daily    sodium chloride flush  10 mL Intravenous 2 times per day    enoxaparin  40 mg Subcutaneous Daily    remdesivir IVPB  200 mg Intravenous Once    Followed by   Ashutosh Gandara ON 12/21/2020] remdesivir IVPB  100 mg Intravenous Q24H       Continuous Infusions:      PRN Meds:  guaiFENesin, acetaminophen **OR** acetaminophen, guaiFENesin-dextromethorphan, sodium chloride flush, promethazine **OR** ondansetron, polyethylene glycol, sodium chloride       reports that she has never smoked. She has never used smokeless tobacco.    Family History   Problem Relation Age of Onset    Ovarian Cancer Mother     Lung Cancer Father     Breast Cancer Sister     Cervical Cancer Sister        Social History     Socioeconomic History    Marital status: Single     Spouse name: None    Number of children: None    Years of education: None    Highest education level: None   Occupational History    None   Social Needs    Financial resource strain: None    Food insecurity     Worry: None     Inability: None    Transportation needs     Medical: None     Non-medical: None   Tobacco Use    Smoking status: Never Smoker    Smokeless tobacco: Never Used   Substance and Sexual Activity    Alcohol use: No    Drug use: No    Sexual activity: Never   Lifestyle    Physical activity     Days per week: None     Minutes per session: None    Stress: None   Relationships    Social connections     Talks on phone: None     Gets together: None     Attends Judaism service: None     Active member of club or organization: None     Attends meetings of clubs or organizations: None     Relationship status: None    Intimate partner violence     Fear of current or ex partner: None     Emotionally abused: None     Physically abused: None     Forced sexual activity: None   Other Topics Concern    None   Social History Narrative    None     REVIEW OF SYSTEMS:         Unable to do detailed ROS due to communication barrier. Patient has developmental delay    She has a cough. No fever. No chest pain. VS:   /84   Pulse 91   Temp 97.6 °F (36.4 °C) (Oral)   Resp 20   Ht 5' (1.524 m)   Wt 149 lb 8 oz (67.8 kg)   SpO2 98%   BMI 29.20 kg/m²     Gen: No distress. Alert. Eyes: PERRL. No sclera icterus. No conjunctival injection. ENT: No discharge. Pharynx clear. Neck: Trachea midline. Normal thyroid. Resp: No accessory muscle use. Bibasilar crackles. No wheezes. No rhonchi. No dullness on percussion. CV: Regular rate. Regular rhythm. No murmur or rub. No edema. GI: Non-tender. Non-distended. No masses. No organomegaly. Normal bowel sounds. No hernia. Skin: Warm and dry. No nodule on exposed extremities. No rash on exposed extremities. Lymph: No cervical LAD. No supraclavicular LAD. M/S: No cyanosis. No joint deformity. No clubbing. Neuro: Awake. Reflexes 2+ symmetric bilaterally. Moves all 4 extremities, non focal  Psych: Oriented x 3. No anxiety or agitation. CBC:   Recent Labs     12/20/20  0951   WBC 3.9*   HGB 12.1   HCT 36.2   MCV 95.3        BMP:   Recent Labs     12/20/20  0951   *   K 3.9   CL 92*   CO2 33*   BUN 12   CREATININE 0.9     LIVER PROFILE:   Recent Labs     12/20/20  0951   AST 40*   ALT 30   BILITOT 0.3   ALKPHOS 222*     Results for David Juarez (MRN [id number]) as of 12/20/2020 14:24   Ref. Range 12/20/2020 09:51   Procalcitonin Latest Ref Range: 0.00 - 0.15 ng/mL 0.14       XR CHEST PORTABLE   Final Result   1. Worsening multifocal pneumonia. Recommend chest radiograph in 8 weeks to   confirm resolution. ASSESSMENT:    Principal Problem:    Acute respiratory failure due to COVID-19 [facility])  Active Problems:    HTN (hypertension)    Seizure ([facility] [address]    COVID-19    Developmental delay  Resolved Problems:    * No resolved hospital problems. *      PLAN:    #Acute respiratory failure due to COVID-19. Patient is under the hospital.  Started on supplemental oxygen. Droplet plus precautions. Will contact sister to get consent for convalescent plasma. Will initiate remdesivir and Decadron. Will monitor LFTs and renal function closely. #Hypertension. Blood pressure is normal.  Continue with Coreg. #Seizure disorder. Continue home seizure medications.     #Developmental mental delay.    #Continue PPI for GERD. #Continue home antidepressants. #Lovenox 30 mg subcu twice daily for DVT prophylaxis. Intermittent dosing for COVID-19 infection. Comment: Please note this report has been produced using speech recognition software and may contain errors related to that system including errors in grammar, punctuation, and spelling, as well as words and phrases that may be inappropriate. If there are any questions or concerns please feel free to contact the dictating provider for clarification.                   Lady Acuna 12/20/2020 2:22 PM

## 2020-12-21 LAB
ALBUMIN SERPL-MCNC: 3.3 G/DL (ref 3.4–5)
ALP BLD-CCNC: 194 U/L (ref 40–129)
ALT SERPL-CCNC: 29 U/L (ref 10–40)
ANION GAP SERPL CALCULATED.3IONS-SCNC: 11 MMOL/L (ref 3–16)
AST SERPL-CCNC: 37 U/L (ref 15–37)
BASOPHILS ABSOLUTE: 0 K/UL (ref 0–0.2)
BASOPHILS RELATIVE PERCENT: 0.2 %
BILIRUB SERPL-MCNC: 0.3 MG/DL (ref 0–1)
BILIRUBIN DIRECT: <0.2 MG/DL (ref 0–0.3)
BILIRUBIN, INDIRECT: ABNORMAL MG/DL (ref 0–1)
BLOOD BANK DISPENSE STATUS: NORMAL
BLOOD BANK PRODUCT CODE: NORMAL
BPU ID: NORMAL
BUN BLDV-MCNC: 13 MG/DL (ref 7–20)
CALCIUM SERPL-MCNC: 8.7 MG/DL (ref 8.3–10.6)
CHLORIDE BLD-SCNC: 93 MMOL/L (ref 99–110)
CO2: 31 MMOL/L (ref 21–32)
CREAT SERPL-MCNC: 0.8 MG/DL (ref 0.6–1.2)
D DIMER: 593 NG/ML DDU (ref 0–229)
DESCRIPTION BLOOD BANK: NORMAL
EOSINOPHILS ABSOLUTE: 0 K/UL (ref 0–0.6)
EOSINOPHILS RELATIVE PERCENT: 0 %
GFR AFRICAN AMERICAN: >60
GFR NON-AFRICAN AMERICAN: >60
GLUCOSE BLD-MCNC: 91 MG/DL (ref 70–99)
HCT VFR BLD CALC: 34.1 % (ref 36–48)
HEMOGLOBIN: 11.3 G/DL (ref 12–16)
LYMPHOCYTES ABSOLUTE: 1 K/UL (ref 1–5.1)
LYMPHOCYTES RELATIVE PERCENT: 29.6 %
MAGNESIUM: 2 MG/DL (ref 1.8–2.4)
MCH RBC QN AUTO: 32 PG (ref 26–34)
MCHC RBC AUTO-ENTMCNC: 33.3 G/DL (ref 31–36)
MCV RBC AUTO: 96.2 FL (ref 80–100)
MONOCYTES ABSOLUTE: 0.4 K/UL (ref 0–1.3)
MONOCYTES RELATIVE PERCENT: 11.8 %
NEUTROPHILS ABSOLUTE: 2 K/UL (ref 1.7–7.7)
NEUTROPHILS RELATIVE PERCENT: 58.4 %
PDW BLD-RTO: 13.3 % (ref 12.4–15.4)
PLATELET # BLD: 257 K/UL (ref 135–450)
PMV BLD AUTO: 7.7 FL (ref 5–10.5)
POTASSIUM REFLEX MAGNESIUM: 3.5 MMOL/L (ref 3.5–5.1)
RBC # BLD: 3.54 M/UL (ref 4–5.2)
SODIUM BLD-SCNC: 135 MMOL/L (ref 136–145)
TOTAL PROTEIN: 7.2 G/DL (ref 6.4–8.2)
WBC # BLD: 3.4 K/UL (ref 4–11)

## 2020-12-21 PROCEDURE — 94640 AIRWAY INHALATION TREATMENT: CPT

## 2020-12-21 PROCEDURE — 85379 FIBRIN DEGRADATION QUANT: CPT

## 2020-12-21 PROCEDURE — 6370000000 HC RX 637 (ALT 250 FOR IP): Performed by: PHYSICIAN ASSISTANT

## 2020-12-21 PROCEDURE — 2700000000 HC OXYGEN THERAPY PER DAY

## 2020-12-21 PROCEDURE — 94761 N-INVAS EAR/PLS OXIMETRY MLT: CPT

## 2020-12-21 PROCEDURE — 2500000003 HC RX 250 WO HCPCS: Performed by: INTERNAL MEDICINE

## 2020-12-21 PROCEDURE — 36415 COLL VENOUS BLD VENIPUNCTURE: CPT

## 2020-12-21 PROCEDURE — 2580000003 HC RX 258: Performed by: PHYSICIAN ASSISTANT

## 2020-12-21 PROCEDURE — 83735 ASSAY OF MAGNESIUM: CPT

## 2020-12-21 PROCEDURE — 80048 BASIC METABOLIC PNL TOTAL CA: CPT

## 2020-12-21 PROCEDURE — 99233 SBSQ HOSP IP/OBS HIGH 50: CPT | Performed by: INTERNAL MEDICINE

## 2020-12-21 PROCEDURE — 1200000000 HC SEMI PRIVATE

## 2020-12-21 PROCEDURE — 2580000003 HC RX 258: Performed by: INTERNAL MEDICINE

## 2020-12-21 PROCEDURE — 6360000002 HC RX W HCPCS: Performed by: INTERNAL MEDICINE

## 2020-12-21 PROCEDURE — 99232 SBSQ HOSP IP/OBS MODERATE 35: CPT | Performed by: INTERNAL MEDICINE

## 2020-12-21 PROCEDURE — 85025 COMPLETE CBC W/AUTO DIFF WBC: CPT

## 2020-12-21 PROCEDURE — 80076 HEPATIC FUNCTION PANEL: CPT

## 2020-12-21 PROCEDURE — 6360000002 HC RX W HCPCS: Performed by: PHYSICIAN ASSISTANT

## 2020-12-21 RX ADMIN — Medication 2 PUFF: at 18:54

## 2020-12-21 RX ADMIN — LEVETIRACETAM 500 MG: 500 TABLET ORAL at 09:16

## 2020-12-21 RX ADMIN — REMDESIVIR 100 MG: 100 INJECTION, POWDER, LYOPHILIZED, FOR SOLUTION INTRAVENOUS at 14:57

## 2020-12-21 RX ADMIN — METOCLOPRAMIDE 5 MG: 10 TABLET ORAL at 21:30

## 2020-12-21 RX ADMIN — METOCLOPRAMIDE 5 MG: 10 TABLET ORAL at 05:26

## 2020-12-21 RX ADMIN — FUROSEMIDE 40 MG: 40 TABLET ORAL at 09:16

## 2020-12-21 RX ADMIN — LEVETIRACETAM 500 MG: 500 TABLET ORAL at 21:31

## 2020-12-21 RX ADMIN — SODIUM CHLORIDE 20 ML: 9 INJECTION, SOLUTION INTRAVENOUS at 01:44

## 2020-12-21 RX ADMIN — CLOMIPRAMINE HYDROCHLORIDE 150 MG: 25 CAPSULE ORAL at 02:23

## 2020-12-21 RX ADMIN — MONTELUKAST SODIUM 10 MG: 10 TABLET, COATED ORAL at 21:31

## 2020-12-21 RX ADMIN — CARVEDILOL 3.12 MG: 3.12 TABLET, FILM COATED ORAL at 17:23

## 2020-12-21 RX ADMIN — ENOXAPARIN SODIUM 30 MG: 100 INJECTION SUBCUTANEOUS at 21:31

## 2020-12-21 RX ADMIN — METOCLOPRAMIDE 5 MG: 10 TABLET ORAL at 17:24

## 2020-12-21 RX ADMIN — BUPROPION HYDROCHLORIDE 200 MG: 100 TABLET, EXTENDED RELEASE ORAL at 09:16

## 2020-12-21 RX ADMIN — GUAIFENESIN 1200 MG: 600 TABLET, EXTENDED RELEASE ORAL at 21:31

## 2020-12-21 RX ADMIN — METOCLOPRAMIDE 5 MG: 10 TABLET ORAL at 12:29

## 2020-12-21 RX ADMIN — FUROSEMIDE 40 MG: 40 TABLET ORAL at 17:24

## 2020-12-21 RX ADMIN — CLOMIPRAMINE HYDROCHLORIDE 150 MG: 25 CAPSULE ORAL at 21:41

## 2020-12-21 RX ADMIN — ENOXAPARIN SODIUM 30 MG: 100 INJECTION SUBCUTANEOUS at 09:16

## 2020-12-21 RX ADMIN — ATORVASTATIN CALCIUM 10 MG: 10 TABLET, FILM COATED ORAL at 21:31

## 2020-12-21 RX ADMIN — Medication 2 PUFF: at 07:53

## 2020-12-21 RX ADMIN — Medication 10 ML: at 21:32

## 2020-12-21 RX ADMIN — DEXAMETHASONE 6 MG: 4 TABLET ORAL at 09:15

## 2020-12-21 RX ADMIN — Medication 10 ML: at 09:16

## 2020-12-21 RX ADMIN — CARVEDILOL 3.12 MG: 3.12 TABLET, FILM COATED ORAL at 09:16

## 2020-12-21 RX ADMIN — ARIPIPRAZOLE 5 MG: 10 TABLET ORAL at 12:29

## 2020-12-21 RX ADMIN — PANTOPRAZOLE SODIUM 40 MG: 40 TABLET, DELAYED RELEASE ORAL at 05:27

## 2020-12-21 RX ADMIN — ASPIRIN 81 MG: 81 TABLET, CHEWABLE ORAL at 09:16

## 2020-12-21 RX ADMIN — ACETAMINOPHEN 650 MG: 325 TABLET ORAL at 02:23

## 2020-12-21 ASSESSMENT — PAIN SCALES - GENERAL
PAINLEVEL_OUTOF10: 3
PAINLEVEL_OUTOF10: 0

## 2020-12-21 ASSESSMENT — PAIN DESCRIPTION - PAIN TYPE: TYPE: ACUTE PAIN

## 2020-12-21 ASSESSMENT — PAIN DESCRIPTION - LOCATION: LOCATION: HEAD

## 2020-12-21 ASSESSMENT — PAIN DESCRIPTION - DESCRIPTORS: DESCRIPTORS: ACHING

## 2020-12-21 NOTE — PROGRESS NOTES
Handoff report and transfer of care given at bedside to [facility] [place]. Patient in stable condition,. Call light within reach.

## 2020-12-21 NOTE — PROGRESS NOTES
Daughter Mekarositablaze Amato updated at this time. Daughter reports patients loves tylenol and suggest we monitor giving this. Will pass on.

## 2020-12-21 NOTE — PROGRESS NOTES
Admit: 2020    Name:  Jaden Silverman  Room:  4563/5458-34  MRN:    [id number]    Daily Progress Note for 2020     Interval History:       Says she wants to go home       Scheduled Meds:   ARIPiprazole  5 mg Oral Lunch    aspirin  81 mg Oral Daily    atorvastatin  10 mg Oral Nightly    budesonide-formoterol  2 puff Inhalation BID    buPROPion  200 mg Oral Daily    carvedilol  3.125 mg Oral BID WC    clomiPRAMINE  150 mg Oral Nightly    levETIRAcetam  500 mg Oral BID    montelukast  10 mg Oral Nightly    metoclopramide  5 mg Oral 4x Daily AC & HS    pantoprazole  40 mg Oral QAM AC    furosemide  40 mg Oral BID    dexamethasone  6 mg Oral Daily    sodium chloride flush  10 mL Intravenous 2 times per day    remdesivir IVPB  100 mg Intravenous Q24H    influenza virus vaccine  0.5 mL Intramuscular Prior to discharge    enoxaparin  30 mg Subcutaneous BID       Continuous Infusions:      PRN Meds:  guaiFENesin, acetaminophen **OR** acetaminophen, guaiFENesin-dextromethorphan, sodium chloride flush, promethazine **OR** ondansetron, polyethylene glycol, sodium chloride                  Objective:     Temp  Av.6 °F (36.4 °C)  Min: 97 °F (36.1 °C)  Max: 98 °F (36.7 °C)  Pulse  Av  Min: 81  Max: 91  BP  Min: 118/91  Max: 136/83  SpO2  Av.8 %  Min: 87 %  Max: 100 %  Patient Vitals for the past 4 hrs:   BP Temp Pulse Resp SpO2   20 0839 126/83 97.5 °F (36.4 °C) 82 18 96 %         Intake/Output Summary (Last 24 hours) at 2020 1052  Last data filed at 2020 0115  Gross per 24 hour   Intake 504 ml   Output --   Net 504 ml       Physical Exam:  Gen: No distress. Alert. Eyes: PERRL. No sclera icterus. No conjunctival injection. ENT: No discharge. Pharynx clear. Neck: Trachea midline. Normal thyroid. Resp: No accessory muscle use. Bibasilar crackles. No wheezes. No rhonchi. No dullness on percussion. CV: Regular rate. Regular rhythm. No murmur or rub. No edema.    GI: Non-tender. Non-distended. No masses. No organomegaly. Normal bowel sounds. No hernia. Skin: Warm and dry. No nodule on exposed extremities. No rash on exposed extremities. Lymph: No cervical LAD. No supraclavicular LAD. M/S: No cyanosis. No joint deformity. No clubbing. Neuro: Awake. Moves all 4 extremities, non focal  Psych: Oriented x 3. No anxiety or agitation. Lab Data:  CBC:   Recent Labs     12/20/20  0951 12/21/20  0550   WBC 3.9* 3.4*   RBC 3.80* 3.54*   HGB 12.1 11.3*   HCT 36.2 34.1*   MCV 95.3 96.2   RDW 13.5 13.3    257     BMP:   Recent Labs     12/20/20  0951 12/21/20  0550   * 135*   K 3.9 3.5   CL 92* 93*   CO2 33* 31   BUN 12 13   CREATININE 0.9 0.8     BNP: No results for input(s): BNP in the last 72 hours. PT/INR: No results for input(s): PROTIME, INR in the last 72 hours. APTT:No results for input(s): APTT in the last 72 hours. CARDIAC ENZYMES:   Recent Labs     12/20/20  0951   TROPONINI <0.01     FASTING LIPID PANEL:  Lab Results   Component Value Date    CHOL 137 09/28/2010    HDL 46 09/28/2010    TRIG 99 09/28/2010     LIVER PROFILE:   Recent Labs     12/20/20  0951 12/21/20  0550   AST 40* 37   ALT 30 29   BILIDIR  --  <0.2   BILITOT 0.3 0.3   ALKPHOS 222* 194*         CXR   1. Worsening multifocal pneumonia. Recommend chest radiograph in 8 weeks to   confirm resolution. Assessment & Plan:     Patient Active Problem List    Diagnosis Date Noted    COVID-19 12/20/2020    Acute respiratory failure due to COVID-19 [facility]) 12/20/2020    Developmental delay 12/20/2020    Pneumonia due to COVID-19 virus     Multifocal pneumonia     Leukopenia     Acute respiratory failure with hypoxia ([address] 04/24/2020    Seizure ([facility] [address] 04/22/2020    Closed fracture of shaft of fibula 12/27/2013    Pilon fracture 12/27/2013    Pneumonia 01/05/2011    HTN (hypertension) 01/05/2011    Depression 01/05/2011     #Acute respiratory failure due to COVID-19.    Started on

## 2020-12-21 NOTE — PROGRESS NOTES
Shift assessment completed. Pt is alert and oriented. To self and place. Up to bedside tolerated ambulation well. Vital signs are WNL. Respirations are even & easy. No complaints voiced. Pt denies needs at this time. SR up x 2 and bed in low position. Call light is within reach. Will monitor.

## 2020-12-21 NOTE — PROGRESS NOTES
Pulmonary Progress Note    CC: COVID-19    Subjective:   Pleasant  Appears comfortable  2 L O2        Intake/Output Summary (Last 24 hours) at 12/21/2020 0716  Last data filed at 12/21/2020 0115  Gross per 24 hour   Intake 504 ml   Output --   Net 504 ml       Exam:   /81   Pulse 89   Temp 97 °F (36.1 °C) (Oral)   Resp 18   Ht 5' (1.524 m)   Wt 149 lb 8 oz (67.8 kg)   SpO2 98%   BMI 29.20 kg/m²  on 2 L  Gen: + Distress. Alert. Eyes: PERRL. No sclera icterus. No conjunctival injection. ENT: No discharge. Pharynx clear. Neck: Trachea midline. Normal thyroid. Resp: + Accessory muscle use. Bilateral crackles. No wheezes. No rhonchi. No dullness on percussion. CV: Regular rate. Regular rhythm. No murmur or rub. No edema. GI: Non-tender. Non-distended. No masses. No organomegaly. Normal bowel sounds. No hernia. Skin: Warm and dry. No nodule on exposed extremities. No rash on exposed extremities. Lymph: No cervical LAD. No supraclavicular LAD. M/S: No cyanosis. No joint deformity. No clubbing. Neuro: Awake. Moves all extremities. CN grossly intact. Psych: Disoriented.   + Anxiety    Scheduled Meds:   ARIPiprazole  5 mg Oral Lunch    aspirin  81 mg Oral Daily    atorvastatin  10 mg Oral Nightly    budesonide-formoterol  2 puff Inhalation BID    buPROPion  200 mg Oral Daily    carvedilol  3.125 mg Oral BID WC    clomiPRAMINE  150 mg Oral Nightly    levETIRAcetam  500 mg Oral BID    montelukast  10 mg Oral Nightly    metoclopramide  5 mg Oral 4x Daily AC & HS    pantoprazole  40 mg Oral QAM AC    furosemide  40 mg Oral BID    dexamethasone  6 mg Oral Daily    sodium chloride flush  10 mL Intravenous 2 times per day    remdesivir IVPB  100 mg Intravenous Q24H    influenza virus vaccine  0.5 mL Intramuscular Prior to discharge    enoxaparin  30 mg Subcutaneous BID     Continuous Infusions:    PRN Meds:  guaiFENesin, acetaminophen **OR** acetaminophen, guaiFENesin-dextromethorphan, sodium chloride flush, promethazine **OR** ondansetron, polyethylene glycol, sodium chloride    Labs:  CBC:   Recent Labs     12/20/20  0951 12/21/20  0550   WBC 3.9* 3.4*   HGB 12.1 11.3*   HCT 36.2 34.1*   MCV 95.3 96.2    257     BMP:   Recent Labs     12/20/20  0951 12/21/20  0550   * 135*   K 3.9 3.5   CL 92* 93*   CO2 33* 31   BUN 12 13   CREATININE 0.9 0.8     LIVER PROFILE:   Recent Labs     12/20/20  0951   AST 40*   ALT 30   BILITOT 0.3   ALKPHOS 222*     PT/INR: No results for input(s): PROTIME, INR in the last 72 hours. APTT: No results for input(s): APTT in the last 72 hours. UA:No results for input(s): NITRITE, COLORU, PHUR, LABCAST, WBCUA, RBCUA, MUCUS, TRICHOMONAS, YEAST, BACTERIA, CLARITYU, SPECGRAV, LEUKOCYTESUR, UROBILINOGEN, BILIRUBINUR, BLOODU, GLUCOSEU, AMORPHOUS in the last 72 hours. Invalid input(s): KETONESU  No results for input(s): PHART, SBY1CPF, PO2ART in the last 72 hours. Cultures:   None    Chest x-ray 12/20 imaging was reviewed by me and showed   Bilateral airspace disease, predominantly lower lobe     ASSESSMENT:  · Acute hypoxemic respite failure  · COVID-19 viral pneumonia  · Multifocal pneumonia  · Leukopenia  · Developmental delay     PLAN:  · Supplemental oxygen to maintain SaO2 >92%; wean as tolerated  · Droplet plus isolation (surgical mask, eye protection, gown, glove)  · Moved to negative pressure room in case needs aerosolized procedure  · Avoid NEBs as able-albuterol MDI strongly preferred   · Dexamethasone day #2  · Remdesivir day #2. Monitor LFTs, renal and CBC daily while on medication.   · 1 units of convalescence plasma 12/20  · Lovenox BID

## 2020-12-21 NOTE — CARE COORDINATION
INTERDISCIPLINARY PLAN OF CARE CONFERENCE    Date/Time: 12/21/2020 2:56 PM  Completed by: Kat Carter, Case Management      Patient Name:  Claudia Andrwes  YOB: 1960  Admitting Diagnosis: [id number] [U07.1]     Admit Date/Time:  12/20/2020  9:17 AM    Chart reviewed. Interdisciplinary team contacted or reviewed plan related to patient progress and discharge plans. Disciplines included Case Management, Nursing, and Dietitian. Current Status: Stable  PT/OT recommendation for discharge plan of care: N/A    Expected D[facility]C Disposition[facility]  Confirmed plan with patient and/or family Yes confirmed with: (name)  Sebastian Bhatia L.G    Discharge Plan Comments:  Reviewed chart and spoke with Benji Ewing at [facility] who states pt will return at OR. Also states has home O2 and CPAP. \[facility]\"" O2 2L  supplier is [facility] home patient and CPAP is thru [facility]. Spoke with NAIN who confirms plan is to return to [facility] at [facility]. Will cont to follow. Home O2 in place on admit: Yes  Pt informed of need to bring portable home O2 tank on day of discharge for nursing to connect prior to leaving:  Yes  Verbalized agreement/Understanding:   Yes

## 2020-12-21 NOTE — PROGRESS NOTES
Patient is sitting in chair. Denies needs. No s/s of distress at this time. Call light and bedside table within reach. Will continue to monitor.

## 2020-12-21 NOTE — PROGRESS NOTES
Shift assessment complete. Patient is stable condition at on baseline O2 of 2L. Patient denies any pain at this time. No assistance needed at this time. Will continue to monitor.

## 2020-12-22 VITALS
HEART RATE: 84 BPM | WEIGHT: 149.5 LBS | RESPIRATION RATE: 18 BRPM | BODY MASS INDEX: 29.35 KG/M2 | DIASTOLIC BLOOD PRESSURE: 79 MMHG | HEIGHT: 60 IN | TEMPERATURE: 98.2 F | OXYGEN SATURATION: 100 % | SYSTOLIC BLOOD PRESSURE: 133 MMHG

## 2020-12-22 LAB
ALBUMIN SERPL-MCNC: 2.7 G/DL (ref 3.4–5)
ALP BLD-CCNC: 159 U/L (ref 40–129)
ALT SERPL-CCNC: 21 U/L (ref 10–40)
ANION GAP SERPL CALCULATED.3IONS-SCNC: 11 MMOL/L (ref 3–16)
AST SERPL-CCNC: 23 U/L (ref 15–37)
BASOPHILS ABSOLUTE: 0 K/UL (ref 0–0.2)
BASOPHILS RELATIVE PERCENT: 0.2 %
BILIRUB SERPL-MCNC: 0.3 MG/DL (ref 0–1)
BILIRUBIN DIRECT: <0.2 MG/DL (ref 0–0.3)
BILIRUBIN, INDIRECT: ABNORMAL MG/DL (ref 0–1)
BUN BLDV-MCNC: 15 MG/DL (ref 7–20)
CALCIUM SERPL-MCNC: 8.5 MG/DL (ref 8.3–10.6)
CHLORIDE BLD-SCNC: 95 MMOL/L (ref 99–110)
CO2: 26 MMOL/L (ref 21–32)
CREAT SERPL-MCNC: 0.7 MG/DL (ref 0.6–1.2)
D DIMER: 800 NG/ML DDU (ref 0–229)
EOSINOPHILS ABSOLUTE: 0 K/UL (ref 0–0.6)
EOSINOPHILS RELATIVE PERCENT: 0 %
GFR AFRICAN AMERICAN: >60
GFR NON-AFRICAN AMERICAN: >60
GLUCOSE BLD-MCNC: 77 MG/DL (ref 70–99)
HCT VFR BLD CALC: 31.9 % (ref 36–48)
HEMOGLOBIN: 10.7 G/DL (ref 12–16)
LYMPHOCYTES ABSOLUTE: 1.3 K/UL (ref 1–5.1)
LYMPHOCYTES RELATIVE PERCENT: 27.2 %
MCH RBC QN AUTO: 32.1 PG (ref 26–34)
MCHC RBC AUTO-ENTMCNC: 33.6 G/DL (ref 31–36)
MCV RBC AUTO: 95.3 FL (ref 80–100)
MONOCYTES ABSOLUTE: 0.4 K/UL (ref 0–1.3)
MONOCYTES RELATIVE PERCENT: 8.4 %
NEUTROPHILS ABSOLUTE: 3.1 K/UL (ref 1.7–7.7)
NEUTROPHILS RELATIVE PERCENT: 64.2 %
PDW BLD-RTO: 13.4 % (ref 12.4–15.4)
PLATELET # BLD: 274 K/UL (ref 135–450)
PMV BLD AUTO: 7.9 FL (ref 5–10.5)
POTASSIUM SERPL-SCNC: 3.3 MMOL/L (ref 3.5–5.1)
RBC # BLD: 3.35 M/UL (ref 4–5.2)
SODIUM BLD-SCNC: 132 MMOL/L (ref 136–145)
TOTAL PROTEIN: 6 G/DL (ref 6.4–8.2)
WBC # BLD: 4.8 K/UL (ref 4–11)

## 2020-12-22 PROCEDURE — 6370000000 HC RX 637 (ALT 250 FOR IP): Performed by: INTERNAL MEDICINE

## 2020-12-22 PROCEDURE — 36415 COLL VENOUS BLD VENIPUNCTURE: CPT

## 2020-12-22 PROCEDURE — 94640 AIRWAY INHALATION TREATMENT: CPT

## 2020-12-22 PROCEDURE — 2580000003 HC RX 258: Performed by: PHYSICIAN ASSISTANT

## 2020-12-22 PROCEDURE — 80048 BASIC METABOLIC PNL TOTAL CA: CPT

## 2020-12-22 PROCEDURE — 6360000002 HC RX W HCPCS: Performed by: PHYSICIAN ASSISTANT

## 2020-12-22 PROCEDURE — 85025 COMPLETE CBC W/AUTO DIFF WBC: CPT

## 2020-12-22 PROCEDURE — 99238 HOSP IP/OBS DSCHRG MGMT 30/<: CPT | Performed by: INTERNAL MEDICINE

## 2020-12-22 PROCEDURE — 85379 FIBRIN DEGRADATION QUANT: CPT

## 2020-12-22 PROCEDURE — 80076 HEPATIC FUNCTION PANEL: CPT

## 2020-12-22 PROCEDURE — 99233 SBSQ HOSP IP/OBS HIGH 50: CPT | Performed by: INTERNAL MEDICINE

## 2020-12-22 PROCEDURE — 6370000000 HC RX 637 (ALT 250 FOR IP): Performed by: PHYSICIAN ASSISTANT

## 2020-12-22 RX ORDER — DEXAMETHASONE 6 MG/1
6 TABLET ORAL DAILY
Qty: 7 TABLET | Refills: 0 | Status: SHIPPED | OUTPATIENT
Start: 2020-12-23 | End: 2020-12-30

## 2020-12-22 RX ORDER — POTASSIUM CHLORIDE 20 MEQ/1
20 TABLET, EXTENDED RELEASE ORAL ONCE
Status: COMPLETED | OUTPATIENT
Start: 2020-12-22 | End: 2020-12-22

## 2020-12-22 RX ADMIN — ASPIRIN 81 MG: 81 TABLET, CHEWABLE ORAL at 09:28

## 2020-12-22 RX ADMIN — CARVEDILOL 3.12 MG: 3.12 TABLET, FILM COATED ORAL at 09:29

## 2020-12-22 RX ADMIN — BUPROPION HYDROCHLORIDE 200 MG: 100 TABLET, EXTENDED RELEASE ORAL at 09:29

## 2020-12-22 RX ADMIN — FUROSEMIDE 40 MG: 40 TABLET ORAL at 09:29

## 2020-12-22 RX ADMIN — LEVETIRACETAM 500 MG: 500 TABLET ORAL at 09:28

## 2020-12-22 RX ADMIN — PANTOPRAZOLE SODIUM 40 MG: 40 TABLET, DELAYED RELEASE ORAL at 05:14

## 2020-12-22 RX ADMIN — DEXAMETHASONE 6 MG: 4 TABLET ORAL at 09:28

## 2020-12-22 RX ADMIN — METOCLOPRAMIDE 5 MG: 10 TABLET ORAL at 05:14

## 2020-12-22 RX ADMIN — METOCLOPRAMIDE 5 MG: 10 TABLET ORAL at 11:14

## 2020-12-22 RX ADMIN — ARIPIPRAZOLE 5 MG: 10 TABLET ORAL at 11:14

## 2020-12-22 RX ADMIN — Medication 2 PUFF: at 06:58

## 2020-12-22 RX ADMIN — METOCLOPRAMIDE 5 MG: 10 TABLET ORAL at 09:28

## 2020-12-22 RX ADMIN — Medication 10 ML: at 09:29

## 2020-12-22 RX ADMIN — POTASSIUM CHLORIDE 20 MEQ: 1500 TABLET, EXTENDED RELEASE ORAL at 11:14

## 2020-12-22 ASSESSMENT — PAIN SCALES - GENERAL: PAINLEVEL_OUTOF10: 0

## 2020-12-22 NOTE — PROGRESS NOTES
Judith Luna (caregiver) educated on discharge instructions as well as new medications use, dosage, administration and possible side effects. Patient verified knowledge. IV removed without difficulty and dry dressing in place. Telemetry monitor removed and returned to [facility]. Pt left facility in stable condition to Home with all of their personal belongings.

## 2020-12-22 NOTE — PROGRESS NOTES
O2 Sat at rest on room air is 97 %. O2 Sat with activity on room air is 94 %. O2 Sat at rest with oxygen @ NA  lpm is %. O2 Sat with activity with oxygen @ NA lpm is %.

## 2020-12-22 NOTE — DISCHARGE INSTR - COC
Continuity of Care Form    Patient Name: Telly Oviedo   :  1960  MRN:  [id number]    Admit date:  2020  Discharge date:  20    Code Status Order: Full Code   Advance Directives:     Admitting Physician:  Aubree Cummings MD  PCP: Rafi Cavazos    Discharging Nurse: SOFIA [facility]/Room#: 3797/3165-59  Discharging Unit Phone Number: [phone]    Emergency Contact:   Extended Emergency Contact Information  Primary Emergency Contact: Aure Gusman [address] Phone: [phone]  Relation: Other  Secondary Emergency Contact: [facility]  Address: [address] [place] [address] Phone: [phone]  Work Phone: [phone]  Relation: Legal Guardian    Past Surgical History:  Past Surgical History:   Procedure Laterality Date    CHOLECYSTECTOMY      HYSTERECTOMY         Immunization History:   Immunization History   Administered Date(s) Administered    Influenza Virus Vaccine 2011    Pneumococcal Polysaccharide (Akenjriem06) 2011       Active Problems:  Patient Active Problem List   Diagnosis Code    Pneumonia J18.9    HTN (hypertension) I10    Depression F32.9    Closed fracture of shaft of fibula S82.409A    Pilon fracture S82.873A    Seizure ([address] R56.9    Acute respiratory failure with hypoxia ([address] J96.01    COVID-19 U07.1    Acute respiratory failure due to COVID-19 ([address] U07.1, J96.00    Developmental delay R62.50    Pneumonia due to COVID-19 virus U07.1, J12.89    Multifocal pneumonia J18.9    Leukopenia D72.819       Isolation/Infection:   Isolation          Droplet Plus        Patient Infection Status     Infection Onset Added Last Indicated Last Indicated By Review Planned Expiration Resolved Resolved By    COVID-19 20 COVID-19 20      Resolved    COVID-19 Rule Out 20 COVID-19 (Ordered)   12/20/20 Rule-Out Test Resulted    C-diff Rule Out 04/26/20 04/26/20 04/26/20 Clostridium difficile toxin/antigen (Ordered)   04/27/20 Lelia Yeboah, TROY    COVID-19 Rule Out 04/22/20 04/22/20 04/22/20 COVID-19 (Ordered)   04/23/20 Rule-Out Test Resulted          Nurse Assessment:  Last Vital Signs: /79   Pulse 84   Temp 98.2 °F (36.8 °C) (Oral)   Resp 18   Ht 5' (1.524 m)   Wt 149 lb 8 oz (67.8 kg)   SpO2 100%   BMI 29.20 kg/m²     Last documented pain score (0-10 scale): Pain Level: 0  Last Weight:   Wt Readings from Last 1 Encounters:   12/20/20 149 lb 8 oz (67.8 kg)     Mental Status:  oriented and alert    IV Access:  - None    Nursing Mobility/ADLs:  Walking   Independent  Transfer  Independent  Bathing  Assisted  Dressing  Independent  Toileting  Independent  Feeding  Independent  Med Admin  Dependent  Med Delivery   whole and one at a time    Wound Care Documentation and Therapy:        Elimination:  Continence:   · Bowel: Yes  · Bladder: Yes  Urinary Catheter: None   Colostomy/Ileostomy/Ileal Conduit: No       Date of Last BM: ***    Intake/Output Summary (Last 24 hours) at 12/22/2020 1154  Last data filed at 12/22/2020 0307  Gross per 24 hour   Intake 130 ml   Output 0 ml   Net 130 ml     I/O last 3 completed shifts: In: 130 [P.O.:120; I.V.:10]  Out: 0     Safety Concerns:     None    Impairments/Disabilities:      None    Nutrition Therapy:  Current Nutrition Therapy:   - Oral Diet:  General    Routes of Feeding: Oral  Liquids: No Restrictions  Daily Fluid Restriction: no  Last Modified Barium Swallow with Video (Video Swallowing Test): not done    Treatments at the Time of Hospital Discharge:   Respiratory Treatments: Yes  Oxygen Therapy:  is on oxygen at PRN L/min per nasal cannula.   Ventilator:    - No ventilator support    Rehab Therapies: NA  Weight Bearing Status/Restrictions: No weight bearing restirctions  Other Medical Equipment (for information only, NOT a DME order):  NA  Other Treatments: NA    Patient's personal belongings (please select all that are sent with patient):  None    RN SIGNATURE:  Electronically signed by Antonio Foster RN on 91/95/34 at 11:57 AM EST    CASE MANAGEMENT/SOCIAL WORK SECTION    Inpatient Status Date: NA    Readmission Risk Assessment Score:  Readmission Risk              Risk of Unplanned Readmission:        18           Discharging to Facility/ Agency   · Name:   · Address:  · Phone:  · Fax:    Dialysis Facility (if applicable)   · Name:  · Address:  · Dialysis Schedule:  · Phone:  · Fax:    / signature: Electronically signed by Antonio Foster RN on 80/50/60 at 11:57 AM EST    PHYSICIAN SECTION     Prognosis: {Prognosis:5953901996}    Condition at Discharge: [address] [place] [address] Patient Condition:[id number]}    Rehab Potential (if transferring to Rehab): {Prognosis:1363062887}    Recommended Labs or Other Treatments After Discharge: ***    Physician Certification: I certify the above information and transfer of Nella Krabbe  is necessary for the continuing treatment of the diagnosis listed and that she requires {Admit to Appropriate Level of Care:44927} for {GREATER/LESS:960102086} 30 days.      Update Admission H&P: {CHP DME Changes in GSEKV:722934151}    PHYSICIAN SIGNATURE:  {Esignature:724881066}

## 2020-12-22 NOTE — FLOWSHEET NOTE
12/22/20 0814   Vital Signs   Temp 98.2 °F (36.8 °C)   Temp Source Oral   Pulse 84   Heart Rate Source Monitor   Resp 18   /79   BP Location Left upper arm   Patient Position Sitting   Level of Consciousness Alert (0)   MEWS Score 1   Patient Currently in Pain Denies   Oxygen Therapy   SpO2 100 %   O2 Device Nasal cannula   O2 Flow Rate (L/min) 3 L/min     Shift assessment complete. See flow sheet. Scheduled meds given. See MAR. Pt refused the Lovenox but did take all her pills one by one. Shot Call light and bedside table within reach. No further needs noted at this time. Will continue to monitor.

## 2020-12-22 NOTE — CARE COORDINATION
DISCHARGE ORDER  Date/Time 2020 3:24 PM  Completed by: Emeli Gomez, Case Management    Patient Name: [facility]      : 1960  Admitting Diagnosis: [id number] [U07.1]      Admit order Date and Status: 20 inpt  (verify MD's last order for status of admission)      Noted discharge order. If applicable PT/OT recommendation at Discharge:  N/A  DME recommendation by PT/OT:N/A  Confirmed discharge plan  : Yes  with whom Cyndy Bryan. If pt confirmed DC plan does family need to be contacted by JAYLAN Yes if yes who SANTIAGO Gibbons  Discharge Plan: Order for dc noted. Spoke with NAIN and plan is return to group home. Noted pt is on RA and 96% with activity. Spoke with Fayette Kanner at group home and they will transport home. No other dc needs identified. Reviewed chart. Role of discharge planner explained and patient verbalized understanding. Discharge order is noted. Has Home O2 in place on admit:  Yes-hs  Informed of need to bring portable home O2 tank on day of discharge for nursing to connect prior to leaving:   Not Indicated  Verbalized agreement/Understanding:   Not Indicated  Pt is being d/c'd to home today. Pt's O2 sats are 96% on RA with activity. Discharge timeout done with  Casimiro, JAYLAN and NAIN. All discharge needs and concerns addressed.

## 2020-12-22 NOTE — DISCHARGE SUMMARY
Gen: No distress. Alert. Eyes: PERRL. No sclera icterus. No conjunctival injection. ENT: No discharge. Pharynx clear. Neck: Trachea midline. Normal thyroid. Resp: No accessory muscle use.  Bibasilar crackles. No wheezes. No rhonchi. No dullness on percussion. CV: Regular rate. Regular rhythm. No murmur or rub. No edema. GI: Non-tender. Non-distended. No masses. No organomegaly. Normal bowel sounds. No hernia. Skin: Warm and dry. No nodule on exposed extremities. No rash on exposed extremities. Lymph: No cervical LAD. No supraclavicular LAD. M/S: No cyanosis. No joint deformity. No clubbing. Neuro: Awake. Moves all 4 extremities, non focal  Psych: Oriented x 3. No anxiety or agitation. CBC:   Recent Labs     12/20/20  0951 12/21/20  0550 12/22/20  0622   WBC 3.9* 3.4* 4.8   HGB 12.1 11.3* 10.7*   HCT 36.2 34.1* 31.9*   MCV 95.3 96.2 95.3    257 274     BMP:   Recent Labs     12/20/20  0951 12/21/20  0550 12/22/20  0622   * 135* 132*   K 3.9 3.5 3.3*   CL 92* 93* 95*   CO2 33* 31 26   BUN 12 13 15   CREATININE 0.9 0.8 0.7     LIVER PROFILE:   Recent Labs     12/20/20  0951 12/21/20  0550 12/22/20  0622   AST 40* 37 23   ALT 30 29 21   BILIDIR  --  <0.2 <0.2   BILITOT 0.3 0.3 0.3   ALKPHOS 222* 194* 159*     CULTURES  SARS-CoV-2, NAAT DETECTED     RADIOLOGY  XR CHEST PORTABLE   Final Result   1. Worsening multifocal pneumonia. Recommend chest radiograph in 8 weeks to   confirm resolution.              Discharge Medications     Medication List      CONTINUE taking these medications    acetaminophen 500 MG tablet  Commonly known as: TYLENOL     ARIPiprazole 5 MG tablet  Commonly known as: ABILIFY     aspirin 81 MG chewable tablet  Take 1 tablet by mouth daily     atorvastatin 10 MG tablet  Commonly known as: LIPITOR     buPROPion 200 MG extended release tablet  Commonly known as: WELLBUTRIN SR     carvedilol 3.125 MG tablet  Commonly known as: COREG     clomiPRAMINE 75 MG capsule  Commonly known as: ANAFRANIL     diphenhydrAMINE 25 MG tablet  Commonly known as: BENADRYL     estradiol 0.025 MG/24HR Pttw     fluticasone 50 MCG/ACT nasal spray  Commonly known as: FLONASE     furosemide 40 MG tablet  Commonly known as: LASIX     guaiFENesin 600 MG extended release tablet  Commonly known as: MUCINEX     hydrocortisone 1 % cream     levETIRAcetam 500 MG tablet  Commonly known as: KEPPRA  Take 1 tablet by mouth 2 times daily     loratadine 10 MG tablet  Commonly known as: CLARITIN     magnesium hydroxide 400 MG/5ML suspension  Commonly known as: MILK OF MAGNESIA     magnesium oxide 400 MG tablet  Commonly known as: MAG-OX     metoclopramide 5 MG tablet  Commonly known as: REGLAN     montelukast 10 MG tablet  Commonly known as: SINGULAIR     NexIUM 40 MG delayed release capsule  Generic drug: esomeprazole     Symbicort 160-4.5 MCG/ACT Aero  Generic drug: budesonide-formoterol     Ventolin  (90 Base) MCG/ACT inhaler  Generic drug: albuterol sulfate HFA     vitamin D 25 MCG (1000 UT) Tabs tablet  Commonly known as: CHOLECALCIFEROL        ASK your doctor about these medications    dexamethasone 6 MG tablet  Commonly known as: DECADRON  Take 1 tablet by mouth daily for 7 days  Ask about: Should I take this medication? Where to Get Your Medications      You can get these medications from any pharmacy    Bring a paper prescription for each of these medications  · dexamethasone 6 MG tablet       Discharged in stable condition to group home     Follow Up: Follow up with PCP       Total time spent on discharge is 35 minutes      DORIS Franco.

## 2020-12-22 NOTE — PROGRESS NOTES
Pulmonary Progress Note    CC: COVID-19    Subjective:   Pleasant  Appears comfortable  Room air        Intake/Output Summary (Last 24 hours) at 12/22/2020 0647  Last data filed at 12/22/2020 0307  Gross per 24 hour   Intake 130 ml   Output 0 ml   Net 130 ml       Exam:   /80   Pulse 83   Temp 97 °F (36.1 °C) (Oral)   Resp 17   Ht 5' (1.524 m)   Wt 149 lb 8 oz (67.8 kg)   SpO2 99%   BMI 29.20 kg/m²  on room air  Gen: No distress. Alert. Eyes: PERRL. No sclera icterus. No conjunctival injection. ENT: No discharge. Pharynx clear. Neck: Trachea midline. Normal thyroid. Resp: No accessory muscle use. Bibasilar crackles. No wheezes. No rhonchi. No dullness on percussion. CV: Regular rate. Regular rhythm. No murmur or rub. No edema. GI: Non-tender. Non-distended. No masses. No organomegaly. Normal bowel sounds. No hernia. Skin: Warm and dry. No nodule on exposed extremities. No rash on exposed extremities. Lymph: No cervical LAD. No supraclavicular LAD. M/S: No cyanosis. No joint deformity. No clubbing. Neuro: Awake. Moves all extremities. CN grossly intact. Psych: Disoriented.   No anxiety    Scheduled Meds:   ARIPiprazole  5 mg Oral Lunch    aspirin  81 mg Oral Daily    atorvastatin  10 mg Oral Nightly    budesonide-formoterol  2 puff Inhalation BID    buPROPion  200 mg Oral Daily    carvedilol  3.125 mg Oral BID WC    clomiPRAMINE  150 mg Oral Nightly    levETIRAcetam  500 mg Oral BID    montelukast  10 mg Oral Nightly    metoclopramide  5 mg Oral 4x Daily AC & HS    pantoprazole  40 mg Oral QAM AC    furosemide  40 mg Oral BID    dexamethasone  6 mg Oral Daily    sodium chloride flush  10 mL Intravenous 2 times per day    remdesivir IVPB  100 mg Intravenous Q24H    influenza virus vaccine  0.5 mL Intramuscular Prior to discharge    enoxaparin  30 mg Subcutaneous BID     Continuous Infusions:    PRN Meds:  guaiFENesin, acetaminophen **OR** acetaminophen, guaiFENesin-dextromethorphan, sodium chloride flush, promethazine **OR** ondansetron, polyethylene glycol, sodium chloride    Labs:  CBC:   Recent Labs     12/20/20  0951 12/21/20  0550   WBC 3.9* 3.4*   HGB 12.1 11.3*   HCT 36.2 34.1*   MCV 95.3 96.2    257     BMP:   Recent Labs     12/20/20  0951 12/21/20  0550   * 135*   K 3.9 3.5   CL 92* 93*   CO2 33* 31   BUN 12 13   CREATININE 0.9 0.8     LIVER PROFILE:   Recent Labs     12/20/20  0951 12/21/20  0550   AST 40* 37   ALT 30 29   BILIDIR  --  <0.2   BILITOT 0.3 0.3   ALKPHOS 222* 194*     PT/INR: No results for input(s): PROTIME, INR in the last 72 hours. APTT: No results for input(s): APTT in the last 72 hours. UA:No results for input(s): NITRITE, COLORU, PHUR, LABCAST, WBCUA, RBCUA, MUCUS, TRICHOMONAS, YEAST, BACTERIA, CLARITYU, SPECGRAV, LEUKOCYTESUR, UROBILINOGEN, BILIRUBINUR, BLOODU, GLUCOSEU, AMORPHOUS in the last 72 hours. Invalid input(s): KETONESU  No results for input(s): PHART, PQE4DTZ, PO2ART in the last 72 hours. Cultures:   None    Chest x-ray 12/20 imaging was reviewed by me and showed   Bilateral airspace disease, predominantly lower lobe     ASSESSMENT:  · Acute hypoxemic respite failure  · COVID-19 viral pneumonia  · Multifocal pneumonia  · Leukopenia  · Developmental delay     PLAN:  · Supplemental oxygen to maintain SaO2 >92%; wean as tolerated  · Droplet plus isolation (surgical mask, eye protection, gown, glove)  · Moved to negative pressure room in case needs aerosolized procedure  · Avoid NEBs as able-albuterol MDI strongly preferred   · Dexamethasone day #3  · Remdesivir day #3. Monitor LFTs, renal and CBC daily while on medication.   · 1 units of convalescence plasma 12/20  · Follow labs today  · Lovenox BID   · Discussed with internal medicine

## 2020-12-22 NOTE — PROGRESS NOTES
Admit: 2020    Name:  Marianela Rodriguez  Room:  [address] [place] [address]  MRN:    [id number]    Daily Progress Note for 2020     Interval History:       Doing well.    On RA       Scheduled Meds:   potassium chloride  20 mEq Oral Once    ARIPiprazole  5 mg Oral Lunch    aspirin  81 mg Oral Daily    atorvastatin  10 mg Oral Nightly    budesonide-formoterol  2 puff Inhalation BID    buPROPion  200 mg Oral Daily    carvedilol  3.125 mg Oral BID WC    clomiPRAMINE  150 mg Oral Nightly    levETIRAcetam  500 mg Oral BID    montelukast  10 mg Oral Nightly    metoclopramide  5 mg Oral 4x Daily AC & HS    pantoprazole  40 mg Oral QAM AC    furosemide  40 mg Oral BID    dexamethasone  6 mg Oral Daily    sodium chloride flush  10 mL Intravenous 2 times per day    remdesivir IVPB  100 mg Intravenous Q24H    influenza virus vaccine  0.5 mL Intramuscular Prior to discharge    enoxaparin  30 mg Subcutaneous BID       Continuous Infusions:      PRN Meds:  guaiFENesin, acetaminophen **OR** acetaminophen, guaiFENesin-dextromethorphan, sodium chloride flush, promethazine **OR** ondansetron, polyethylene glycol, sodium chloride                  Objective:     Temp  Av.6 °F (36.4 °C)  Min: 97 °F (36.1 °C)  Max: 98.2 °F (36.8 °C)  Pulse  Av  Min: 80  Max: 84  BP  Min: 122/76  Max: 133/79  SpO2  Av.8 %  Min: 92 %  Max: 100 %  Patient Vitals for the past 4 hrs:   BP Temp Temp src Pulse Resp SpO2   20 0814 133/79 98.2 °F (36.8 °C) Oral 84 18 100 %   20 0701 -- -- -- -- -- 95 %         Intake/Output Summary (Last 24 hours) at 2020 1100  Last data filed at 2020 0307  Gross per 24 hour   Intake 130 ml   Output 0 ml   Net 130 ml       Physical Exam:      Lab Data:  CBC:   Recent Labs     20  0951 20  0550 20  0622   WBC 3.9* 3.4* 4.8   RBC 3.80* 3.54* 3.35*   HGB 12.1 11.3* 10.7*   HCT 36.2 34.1* 31.9*   MCV 95.3 96.2 95.3   RDW 13.5 13.3 13.4    257 274     BMP: Recent Labs     12/20/20  0951 12/21/20  0550 12/22/20  0622   * 135* 132*   K 3.9 3.5 3.3*   CL 92* 93* 95*   CO2 33* 31 26   BUN 12 13 15   CREATININE 0.9 0.8 0.7     BNP: No results for input(s): BNP in the last 72 hours. PT/INR: No results for input(s): PROTIME, INR in the last 72 hours. APTT:No results for input(s): APTT in the last 72 hours. CARDIAC ENZYMES:   Recent Labs     12/20/20  0951   TROPONINI <0.01     FASTING LIPID PANEL:  Lab Results   Component Value Date    CHOL 137 09/28/2010    HDL 46 09/28/2010    TRIG 99 09/28/2010     LIVER PROFILE:   Recent Labs     12/20/20  0951 12/21/20  0550 12/22/20  0622   AST 40* 37 23   ALT 30 29 21   BILIDIR  --  <0.2 <0.2   BILITOT 0.3 0.3 0.3   ALKPHOS 222* 194* 159*         CXR   1. Worsening multifocal pneumonia. Recommend chest radiograph in 8 weeks to   confirm resolution. Assessment & Plan:     Patient Active Problem List    Diagnosis Date Noted    COVID-19 12/20/2020    Acute respiratory failure due to COVID-19 [facility]) 12/20/2020    Developmental delay 12/20/2020    Pneumonia due to COVID-19 virus     Multifocal pneumonia     Leukopenia     Acute respiratory failure with hypoxia ([facility] 04/24/2020    Seizure ([facility] 04/22/2020    Closed fracture of shaft of fibula 12/27/2013    Pilon fracture 12/27/2013    Pneumonia 01/05/2011    HTN (hypertension) 01/05/2011    Depression 01/05/2011     #Acute respiratory failure due to COVID-19. Started on supplemental oxygen. Droplet plus precautions. s/p convalescent plasma. continue remdesivir and Decadron. Will monitor LFTs and renal function closely. On RA today     #Hypertension. Blood pressure is normal.  Continue with Coreg. Chronic systolic congestive heart failure. Ejection fraction 40%. Continue Lasix and Coreg. Unclear why she is not on ACE or ARB.     #Seizure disorder.   Continue home seizure medications.     #Developmental mental delay.     #Continue PPI for GERD.     #Continue home antidepressants.     #Lovenox 30 mg subcu twice daily for DVT prophylaxis    D/c to group home       Anabel Skelton

## 2020-12-22 NOTE — PROGRESS NOTES
VSS - afebrile. Pt is alert and oriented x 3 - disoriented to time- with no reported history of falls. Assessment completed as charted. Bed is in lowest position with 2/4 bed rails raised, bed alarm turned on, wheels locked and call light within reach - patient wearing non-skid socks and verbalizes understanding to call out for assistance. No further requests at this time. Will continue to monitor.        Vitals:    12/21/20 2046   BP: 126/88   Pulse: 81   Resp: 16   Temp: 97.5 °F (36.4 °C)   SpO2: 92%

## 2020-12-23 ENCOUNTER — CARE COORDINATION (OUTPATIENT)
Dept: CASE MANAGEMENT | Age: 60
End: 2020-12-23

## 2020-12-23 NOTE — CARE COORDINATION
[facility] Initial Outreach    Call within 2 business days of discharge: Yes    Patient: Ping Payan Patient : 1960   MRN: [id number]  Discharge Date: 20   RARS: Readmission Risk Score: 18      Last Discharge [facility]       Complaint Diagnosis Description Type Department Provider    20 Shortness of Breath COVID-19 . .. ED to Hosp-Admission (Discharged) (ADMITTED) [facility] Matthew Borges MD; Prema Xavier. .. COVID-19 Detected on 2020.     1st attempt - Unable to reach or leave message for caregiver or group home at this time. Follow Up  No future appointments.     Pee Pettit RN  Care Transition Nurse  [phone] mobile

## 2020-12-24 ENCOUNTER — CARE COORDINATION (OUTPATIENT)
Dept: CASE MANAGEMENT | Age: 60
End: 2020-12-24

## 2020-12-24 NOTE — CARE COORDINATION
[facility] Transitions Initial Follow Up Call    Call within 2 business days of discharge: Yes    Patient: Milad Chowdhury Patient : 1960   MRN: [id number]  Reason for Admission: COVID-19   Discharge Date: 20 RARS: Readmission Risk Score: 18      Last Discharge [facility]       Complaint Diagnosis Description Type Department Provider    20 Shortness of Breath COVID-19 . .. ED to Hosp-Admission (Discharged) (ADMITTED) [facility] Jasmeet Carolina MD; Benito Yates. .. Spoke with: Brent Bhatia 43: [facility]     CTN spoke with patient who verified HIPAA. Patient reported she slept well last night and caregiver at the [address] has all her medications. Patient denied any further issues or concerns at this time. Care Transitions 24 Hour Call    Do you have any ongoing symptoms?: No  Do you have a copy of your discharge instructions?: Yes  Care Transitions Interventions         Follow Up  No future appointments.     Kiya Beasley RN

## 2021-06-28 RX ORDER — ISOSORBIDE MONONITRATE 30 MG/1
30 TABLET, EXTENDED RELEASE ORAL DAILY
COMMUNITY

## 2021-06-28 RX ORDER — IBUPROFEN 400 MG/1
400 TABLET ORAL EVERY 6 HOURS PRN
COMMUNITY
End: 2021-08-17

## 2021-06-28 RX ORDER — LOSARTAN POTASSIUM 25 MG/1
50 TABLET ORAL DAILY
COMMUNITY

## 2021-06-28 RX ORDER — BUDESONIDE AND FORMOTEROL FUMARATE DIHYDRATE 160; 4.5 UG/1; UG/1
2 AEROSOL RESPIRATORY (INHALATION) 2 TIMES DAILY
COMMUNITY
End: 2022-07-20

## 2021-06-28 RX ORDER — METOPROLOL SUCCINATE 25 MG/1
25 TABLET, EXTENDED RELEASE ORAL DAILY
COMMUNITY

## 2021-07-06 ENCOUNTER — HOSPITAL ENCOUNTER (OUTPATIENT)
Age: 61
Discharge: HOME | End: 2021-07-06
Payer: MEDICARE

## 2021-07-06 ENCOUNTER — HOSPITAL ENCOUNTER (OUTPATIENT)
Dept: GENERAL RADIOLOGY | Age: 61
Discharge: HOME | End: 2021-07-06
Payer: MEDICARE

## 2021-07-06 ENCOUNTER — OFFICE VISIT (OUTPATIENT)
Dept: PULMONOLOGY | Age: 61
End: 2021-07-06
Payer: MEDICARE

## 2021-07-06 VITALS
BODY MASS INDEX: 30.55 KG/M2 | DIASTOLIC BLOOD PRESSURE: 71 MMHG | HEIGHT: 61 IN | TEMPERATURE: 98.6 F | WEIGHT: 161.8 LBS | OXYGEN SATURATION: 94 % | SYSTOLIC BLOOD PRESSURE: 134 MMHG | HEART RATE: 70 BPM

## 2021-07-06 DIAGNOSIS — U07.1 COVID-19: ICD-10-CM

## 2021-07-06 DIAGNOSIS — R06.02 SHORTNESS OF BREATH: ICD-10-CM

## 2021-07-06 DIAGNOSIS — J45.40 MODERATE PERSISTENT ASTHMA WITHOUT COMPLICATION: Primary | ICD-10-CM

## 2021-07-06 DIAGNOSIS — G47.33 OBSTRUCTIVE SLEEP APNEA: ICD-10-CM

## 2021-07-06 DIAGNOSIS — R05.9 COUGH: ICD-10-CM

## 2021-07-06 PROCEDURE — 99214 OFFICE O/P EST MOD 30 MIN: CPT | Performed by: INTERNAL MEDICINE

## 2021-07-06 PROCEDURE — 71046 X-RAY EXAM CHEST 2 VIEWS: CPT

## 2021-07-06 ASSESSMENT — ENCOUNTER SYMPTOMS
RESPIRATORY NEGATIVE: 1
GASTROINTESTINAL NEGATIVE: 1
EYES NEGATIVE: 1
ALLERGIC/IMMUNOLOGIC NEGATIVE: 1

## 2021-07-06 NOTE — PROGRESS NOTES
Caitlyn Snell (:  1960) is a 64 y.o. female,Established patient, here for evaluation of the following chief complaint(s):  Follow-up (sleep)         ASSESSMENT/PLAN:  1. Moderate persistent asthma without complication  2. Obstructive sleep apnea  3. Shortness of breath  4. Cough  5. COVID-19  -     XR CHEST STANDARD (2 VW); Future      Continue with:  symbicort 160/4.5 2puff twice a day, spacer  singulair (montelukast) once a day  Rescue albuterol (ventolin) as needed  Using spacer     Need to continue walk and exercise     Cough is better but at times more    Weight was 162 and then 157 and then up to 167 then at 145 and now at 161    Sleep study showed ahi of 55, this was redone 2016  Last study showed ahi of 33  bipap titration done 10/2017  Showed bipap of 11/7     dme is [employer]    Needs new straps and headgear    autobipap with ipap max of 20 and epap min of 13 and ps of 4  Using with nasal pillows  ti min of 0.3 and max 2.0  Trig and cycle medium    Using 30/30 of time  Using 5.8 h/night    90% pressure is 19.4/15.6  tv is 160  mv is 2.8    ahi is 48  Leak at 110 lpm    I don't have access via airview    Will do bipap retitration  Will need bipap titration  may need adjustments  Will give ambien to sleep with  Do this during the day, sleep study  She has special needs and will have sitter        For several reasons, one to get the correct pressure  2. For compliance issues  3. To see about oxygen needs    Will need to get sleep study [facility].   May need oxygen      Seen by ENT and was not a good candidate for the Inspire    Was hospitalized 2017 with dx of Acute CHF  Would suggest taking Lasix (furosemide) in the day, like noon rather than at night     Was hospitalized at [facility] with [address] in 2020  She has recovered  Will repeat Cxr today to see if clearing of the infiltrates  Last cxr was done 2020    Will get noct pulse ox on bipap  May need oxygen at night    Call me after the pulse ox done      Room air oxygen level was 94%, she will not need oxygen at this time during the daytime. We will go ahead and do a nocturnal pulse ox on the BiPAP. If the oxygen levels do drop then will probably start her on oxygen at night which she can use with or without the BiPAP. She has been having difficulty keeping the BiPAP on for many years. She may need to only go to oxygen only at night. As this may be the only option for her available at this time. Return in about 6 weeks (around 8/17/2021). I have personally reviewed and summarized the old records and/or obtained further history from someone other than the patient. I have independently reviewed the images and reviewed with patient    I have reviewed the lab tests, radiology reports and medications    I have downloaded and interpreted the cpap/bipap/pap data. I have made adjustments as described    Reviewed present meds and side effects. Continue present meds. Stay compliant. Call if worsens. Reviewed proper inhaler usage    Will ask for old records              Rlleah Jack [address]  [address]  [address] [address]  Dept: [phone]  Loc: [phone]     Diagnosis:  [x] LYNNETTE (ICD-10: G47.33)  o CSA (ICD-10: G47.31)  [] Complex Sleep Apnea (ICD-10: G47.37)  []  (ICD-10: G47.37)  [x] Hypoxemia (ICD-10: R09.02)  [] COPD (J44.90)  [] Chronic Respiratory Failure with hypoxemia (J96.11)  [] Chronicr Respiratory Failure with hypercapnia (J96.12)  [] Restrictive Lung Disease (J98.4)      [] New Rx (Device Preference: _________________________)     [] Change Order       [] Replace ___________    [] Discontinue Order -  [] CPAP    [] BPAP    [] PAP    [] Oxygen   /   AMA?  [] Yes   [] No              Therapy    AHI: ________ MICHELL: ________  LOW SpO2: ________%      DME:    DEVICE / SETTINGS RAMP / COMFORT INTERFACE   []  CPAP () Pressure Ramp: _________ min's  [] Ramp to patient preference General PAP Supply Kit  Medicaid does not cover heated tubing  ([employer])  PAP Tubing:  [] Heated ()- 1/3 mos                         [] Regular ()  1/3 mos  [] Disposable Water Chamber () - 1/6 mos  [] Disposable Filter () - 2/mo  [] Non-Disposable Filter () - 1/6 mos   []  BiLevel PAP ()           IPAP        []  BiLevel PAP with   ()       Backup Rate ()      EPAP Rate  [] Adjust FLEX to patient comfort       SUPPLEMENTAL OXYGEN  [] OXYGEN:      Liter/min: _________  [] Continuous        [] Nocturnal  [] Bleed into PAP Device      []  [employer]                   Max Press   Mask Interface Kit    [] Mask interface () - 1/3 mos  [] Nasal Cushion ()  2/mo  [] Nasal Pillows ()  -2/mo  [] Headgear ()  1/6 mos   []  AutoBiLevel () Pressure  ()      Support           []  ResMed® IVAPS EPAP  [] Overnight Oximetry on Room Air  [x] Overnight Oximetry on PAP Therapy    Target Pt Rate  Min PS      Target Va  Patient Ht  Ti Max                Ti Min        Rise time  Max PS  Trigger  Cycle  Epap  Epap max  Epap min  The patient has a history of:  [] Excessive Daytime Sleepiness  [] Insomnia  [] Impaired Cognition  [] Ischemic Heart Disease  [] Hypertension  [] Mood Disorders          [] History of Stroke  Additional Orders:______________________________________________________________________________________________________________________________________________________________________________     Full Face Mask Kit    [] Full face mask ()  1/3 mos  [] Full Face Cushion ()  1/mo  [] Headgear ()  1/6 mos                                           [] Respironics® ASV Advanced ()     EPAP Min  PS Min      EPAP Max  PS Max   Additional Supplies    [] Chin Strap ()  [] Heated Humidifier Kit ()  Mask Specifications:   [] Patient Preference      -or- is significant improvement of the respiratory events on the RAD                                                                                                                                                                                                                                  Salbador Higgins MD               NPI- [id number]     Stacy Cabello [id number]                    07/06/21       ____________________________                        _______________________           Physician Signature                                                         Date                                                   Subjective   SUBJECTIVE/OBJECTIVE:  Using new bipap  And feeling well  However still having issues of keeping it on all night    Some Headache? always complains aobut this  No bloating  No burping  No chest pain    no ear popping    Going to bed 930 am and waking up 630    Breathing well  No chest pain    symbicort bid with spacer    Cough, no    Exercising more often, doing 2 laps    Some sob at [facility]    Was hospitalized in Feb 2020  And was on oxygen but now Using only with cpap    Overall dong well           Was seen by Dr. Randy Grant in regards to possible inspire placement  Seen 5/20/2021      ASSESSMENT:  1. LYNNETTE (obstructive sleep apnea)   2. BMI 35.0-35.9,adult   3. Intellectual disability     PLAN:  -We reviewed her history. She has severe LYNNETTE which seems to be refractory to CPAP therapy even when she is compliant. However, she overwhelmingly is noncompliant and has not been wearing her mask. Unfortunately, I feel that her options are fairly limited at this point. Other than positive pressure ventilation therapy, I feel that her next best option is likely weight loss which we discussed. From a surgical standpoint, I do not feel that complex pharyngeal, laryngeal, or mandibular surgery would be likely to result in any meaningful benefit and would also come with significant pain.  She is edentulous and therefore cannot try an oral appliance. 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 The only consideration from my point of view would be if she wanted to have a repeat DISE to verify her previous pattern of collapse, although this would be unlikely to change. Patient's caregiver plans to discuss with patient's sister who is her guardian, and expects that she will likely contact me to discuss further. In the office with melecio  Was hospitalized in 12/2020 with covid  Not really interested in getting covid vaccine    Asthma has been well controlled  Some exe dyspnea          Review of Systems   Constitutional: Negative. HENT: Negative. Eyes: Negative. Respiratory: Negative. Cardiovascular: Negative. Gastrointestinal: Negative. Endocrine: Negative. Genitourinary: Negative. Musculoskeletal: Negative. Skin: Negative. Allergic/Immunologic: Negative. Neurological: Negative. Hematological: Negative. Psychiatric/Behavioral: Negative. Objective   Physical Exam  Vitals and nursing note reviewed. Constitutional:       General: She is not in acute distress. Appearance: Normal appearance. She is not ill-appearing. HENT:      Head: Normocephalic and atraumatic. Right Ear: External ear normal.      Left Ear: External ear normal.      Nose: Nose normal.      Mouth/Throat:      Mouth: Mucous membranes are moist.      Pharynx: Oropharynx is clear. Comments: Mallampati 3  Eyes:      General: No scleral icterus. Extraocular Movements: Extraocular movements intact. Conjunctiva/sclera: Conjunctivae normal.      Pupils: Pupils are equal, round, and reactive to light. Cardiovascular:      Rate and Rhythm: Normal rate and regular rhythm. Pulses: Normal pulses. Heart sounds: Normal heart sounds. No murmur heard. No friction rub.    Pulmonary: Effort: Pulmonary effort is normal. No respiratory distress. Breath sounds: Normal breath sounds. No stridor. No wheezing, rhonchi or rales. Chest:      Chest wall: No tenderness. Abdominal:      General: Abdomen is flat. Bowel sounds are normal. There is no distension. Tenderness: There is no abdominal tenderness. There is no guarding. Musculoskeletal:         General: No swelling or tenderness. Normal range of motion. Cervical back: Normal range of motion and neck supple. No rigidity. Skin:     General: Skin is warm and dry. Coloration: Skin is not jaundiced. Neurological:      General: No focal deficit present. Mental Status: She is alert and oriented to person, place, and time. Mental status is at baseline. Cranial Nerves: No cranial nerve deficit. Sensory: No sensory deficit. Motor: No weakness. Gait: Gait normal.   Psychiatric:         Mood and Affect: Mood normal.         Thought Content:  Thought content normal.         Judgment: Judgment normal.                  An electronic signature was used to authenticate this note.    --Rogelio Elliott MD

## 2021-07-06 NOTE — PATIENT INSTRUCTIONS
ASSESSMENT/PLAN:  1. Moderate persistent asthma without complication  2. Obstructive sleep apnea  3. Shortness of breath  4. Cough      Continue with:  symbicort 160/4.5 2puff twice a day, spacer  singulair (montelukast) once a day  Rescue albuterol (ventolin) as needed  Using spacer     Need to continue walk and exercise     Cough is better but at times more    Weight was 162 and then 157 and then up to 167 then at 145 and now at 161    Sleep study showed ahi of 55, this was redone 7/2016  Last study showed ahi of 33  bipap titration done 10/2017  Showed bipap of 11/7     dme is [employer]    Needs new straps and headgear    autobipap with ipap max of 20 and epap min of 13 and ps of 4  Using with nasal pillows  ti min of 0.3 and max 2.0  Trig and cycle medium    Using 30/30 of time  Using 5.8 h/night    90% pressure is 19.4/15.6  tv is 160  mv is 2.8    ahi is 48  Leak at 110 lpm    I don't have access via airview    Will do bipap retitration  Will need bipap titration 18/14 may need adjustments  Will give ambien to sleep with  Do this during the day, sleep study  She has special needs and will have sitter        For several reasons, one to get the correct pressure  2. For compliance issues  3. To see about oxygen needs    Will need to get sleep study [facility]. May need oxygen      Seen by ENT and was not a good candidate for the Inspire    Was hospitalized 4/2017 with dx of Acute CHF  Would suggest taking Lasix (furosemide) in the day, like noon rather than at night     Was hospitalized at [facility] with [facility] in December 2020  She has recovered  Will repeat Cxr today to see if clearing of the infiltrates  Last cxr was done 12/2020    Will get noct pulse ox on bipap  May need oxygen at night    Call me after the pulse ox done        Room air oxygen level was 94%, she will not need oxygen at this time during the daytime. We will go ahead and do a nocturnal pulse ox on the BiPAP.   If the oxygen levels do drop then will probably start her on oxygen at night which she can use with or without the BiPAP. She has been having difficulty keeping the BiPAP on for many years. She may need to only go to oxygen only at night. As this may be the only option for her available at this time. Remember to bring a list of pulmonary medications and any CPAP or BiPAP machines to your next appointment with the office. Please keep all of your future appointments scheduled by [address], [facility] office. Out of respect for other patients and providers, you may be asked to reschedule your appointment if you arrive later than your scheduled appointment time. Appointments cancelled less than 24hrs in advance will be considered a no show. Patients with three missed appointments within 1 year or four missed appointments within 2 years can be dismissed from the practice. Please be aware that our physicians are required to work in the Intensive Care Unit at [facility].  Your appointment may need to be rescheduled if they are designated to work during your appointment time. You may receive a survey regarding the care you received during your visit. Your input is valuable to us. We encourage you to complete and return your survey. We hope you will choose us in the future for your healthcare needs. Pt instructed of all future appointment dates & times, including radiology, labs, procedures & referrals. If procedures were scheduled preparation instructions provided. Instructions on future appointments with [facility] were given.

## 2021-07-06 NOTE — PROGRESS NOTES
MA Communication: The following orders are received by verbal communication from Marely Hagen MD    Orders include:     Follow up 6 weeks

## 2021-07-06 NOTE — LETTER
7/6/21        Jess Duffy      I have seen this patient in the office today and wanted to communicate my findings and recommendations. Patient Instructions      ASSESSMENT/PLAN:  1. Moderate persistent asthma without complication  2. Obstructive sleep apnea  3. Shortness of breath  4. Cough      Continue with:  symbicort 160/4.5 2puff twice a day, spacer  singulair (montelukast) once a day  Rescue albuterol (ventolin) as needed  Using spacer     Need to continue walk and exercise     Cough is better but at times more    Weight was 162 and then 157 and then up to 167 then at 145 and now at 161    Sleep study showed ahi of 55, this was redone 7/2016  Last study showed ahi of 33  bipap titration done 10/2017  Showed bipap of 11/7     dme is [employer]    Needs new straps and headgear    autobipap with ipap max of 20 and epap min of 13 and ps of 4  Using with nasal pillows  ti min of 0.3 and max 2.0  Trig and cycle medium    Using 30/30 of time  Using 5.8 h/night    90% pressure is 19.4/15.6  tv is 160  mv is 2.8    ahi is 48  Leak at 110 lpm    I don't have access via airview    Will do bipap retitration  Will need bipap titration 18/14 may need adjustments  Will give ambien to sleep with  Do this during the day, sleep study  She has special needs and will have sitter        For several reasons, one to get the correct pressure  2. For compliance issues  3. To see about oxygen needs    Will need to get sleep study [facility].   May need oxygen      Seen by ENT and was not a good candidate for the Inspire    Was hospitalized 4/2017 with dx of Acute CHF  Would suggest taking Lasix (furosemide) in the day, like noon rather than at night     Was hospitalized at [facility] with [address] in December 2020  She has recovered  Will repeat Cxr today to see if clearing of the infiltrates  Last cxr was done 12/2020    Will get noct pulse ox on bipap  May need oxygen at night    Call me after the pulse ox done Thank you for allowing me to assist in the care of the Alessandro Shin MD

## 2021-07-06 NOTE — LETTER
7/6/21        Jess Duffy      I have seen this patient in the office today and wanted to communicate my findings and recommendations. Patient Instructions      ASSESSMENT/PLAN:  1. Moderate persistent asthma without complication  2. Obstructive sleep apnea  3. Shortness of breath  4. Cough      Continue with:  symbicort 160/4.5 2puff twice a day, spacer  singulair (montelukast) once a day  Rescue albuterol (ventolin) as needed  Using spacer     Need to continue walk and exercise     Cough is better but at times more    Weight was 162 and then 157 and then up to 167 then at 145 and now at 161    Sleep study showed ahi of 55, this was redone 7/2016  Last study showed ahi of 33  bipap titration done 10/2017  Showed bipap of 11/7     dme is [employer]    Needs new straps and headgear    autobipap with ipap max of 20 and epap min of 13 and ps of 4  Using with nasal pillows  ti min of 0.3 and max 2.0  Trig and cycle medium    Using 30/30 of time  Using 5.8 h/night    90% pressure is 19.4/15.6  tv is 160  mv is 2.8    ahi is 48  Leak at 110 lpm    I don't have access via airview    Will do bipap retitration  Will need bipap titration 18/14 may need adjustments  Will give ambien to sleep with  Do this during the day, sleep study  She has special needs and will have sitter        For several reasons, one to get the correct pressure  2. For compliance issues  3. To see about oxygen needs    Will need to get sleep study [facility].   May need oxygen      Seen by ENT and was not a good candidate for the Inspire    Was hospitalized 4/2017 with dx of Acute CHF  Would suggest taking Lasix (furosemide) in the day, like noon rather than at night     Was hospitalized at [facility] with [address] in December 2020  She has recovered  Will repeat Cxr today to see if clearing of the infiltrates  Last cxr was done 12/2020    Will get noct pulse ox on bipap  May need oxygen at night    Call me after the pulse ox done        Room air oxygen level was 94%, she will not need oxygen at this time during the daytime. We will go ahead and do a nocturnal pulse ox on the BiPAP. If the oxygen levels do drop then will probably start her on oxygen at night which she can use with or without the BiPAP. She has been having difficulty keeping the BiPAP on for many years. She may need to only go to oxygen only at night. As this may be the only option for her available at this time. Remember to bring a list of pulmonary medications and any CPAP or BiPAP machines to your next appointment with the office. Please keep all of your future appointments scheduled by [address], [facility] office. Out of respect for other patients and providers, you may be asked to reschedule your appointment if you arrive later than your scheduled appointment time. Appointments cancelled less than 24hrs in advance will be considered a no show. Patients with three missed appointments within 1 year or four missed appointments within 2 years can be dismissed from the practice. Please be aware that our physicians are required to work in the Intensive Care Unit at [facility].  Your appointment may need to be rescheduled if they are designated to work during your appointment time. You may receive a survey regarding the care you received during your visit. Your input is valuable to us. We encourage you to complete and return your survey. We hope you will choose us in the future for your healthcare needs. Pt instructed of all future appointment dates & times, including radiology, labs, procedures & referrals. If procedures were scheduled preparation instructions provided. Instructions on future appointments with [facility] were given.                            Thank you for allowing me to assist in the care of the Kane Rock MD

## 2021-08-17 ENCOUNTER — OFFICE VISIT (OUTPATIENT)
Dept: PULMONOLOGY | Age: 61
End: 2021-08-17
Payer: MEDICARE

## 2021-08-17 VITALS
HEART RATE: 77 BPM | WEIGHT: 162 LBS | TEMPERATURE: 98.4 F | RESPIRATION RATE: 16 BRPM | HEIGHT: 61 IN | SYSTOLIC BLOOD PRESSURE: 133 MMHG | DIASTOLIC BLOOD PRESSURE: 65 MMHG | OXYGEN SATURATION: 99 % | BODY MASS INDEX: 30.58 KG/M2

## 2021-08-17 DIAGNOSIS — J45.40 MODERATE PERSISTENT ASTHMA WITHOUT COMPLICATION: ICD-10-CM

## 2021-08-17 DIAGNOSIS — R06.02 SHORTNESS OF BREATH: ICD-10-CM

## 2021-08-17 DIAGNOSIS — E66.9 CLASS 1 OBESITY WITHOUT SERIOUS COMORBIDITY WITH BODY MASS INDEX (BMI) OF 30.0 TO 30.9 IN ADULT, UNSPECIFIED OBESITY TYPE: ICD-10-CM

## 2021-08-17 DIAGNOSIS — G47.33 OBSTRUCTIVE SLEEP APNEA: Primary | ICD-10-CM

## 2021-08-17 PROCEDURE — G8417 CALC BMI ABV UP PARAM F/U: HCPCS | Performed by: INTERNAL MEDICINE

## 2021-08-17 PROCEDURE — 1036F TOBACCO NON-USER: CPT | Performed by: INTERNAL MEDICINE

## 2021-08-17 PROCEDURE — 3017F COLORECTAL CA SCREEN DOC REV: CPT | Performed by: INTERNAL MEDICINE

## 2021-08-17 PROCEDURE — 99214 OFFICE O/P EST MOD 30 MIN: CPT | Performed by: INTERNAL MEDICINE

## 2021-08-17 PROCEDURE — G8427 DOCREV CUR MEDS BY ELIG CLIN: HCPCS | Performed by: INTERNAL MEDICINE

## 2021-08-17 RX ORDER — FLUTICASONE PROPIONATE 50 MCG
SPRAY, SUSPENSION (ML) NASAL
COMMUNITY

## 2021-08-17 ASSESSMENT — ENCOUNTER SYMPTOMS
GASTROINTESTINAL NEGATIVE: 1
RESPIRATORY NEGATIVE: 1
EYES NEGATIVE: 1
ALLERGIC/IMMUNOLOGIC NEGATIVE: 1

## 2021-08-17 NOTE — LETTER
8/17/21        Jess Duffy      I have seen this patient in the office today and wanted to communicate my findings and recommendations. Patient Instructions     ASSESSMENT/PLAN:  1. Obstructive sleep apnea  2. Moderate persistent asthma without complication  3. Shortness of breath  4. Class 1 obesity without serious comorbidity with body mass index (BMI) of 30.0 to 30.9 in adult, unspecified obesity type      Continue with:  symbicort 160/4.5 2puff twice a day, spacer  singulair (montelukast) once a day  Rescue albuterol (ventolin) as needed  Using spacer     Need to continue walk and exercise     Cough is better but at times more    Weight was 162 and then 157 and then up to 167 then at 145 and then at 161 and now at 162    Sleep study showed ahi of 55, this was redone 7/2016  Last study showed ahi of 33  bipap titration done 10/2017  Showed bipap of 11/7     dme is [employer]    Needs new straps and headgear    autobipap with ipap max of 20 and epap min of 13 and ps of 4  Using with nasal pillows  ti min of 0.3 and max 2.0  Trig and cycle medium    Using 30/30 of time  Using 5.6 h/night    90% pressure is 19.5/15.5  tv is 160  mv is 2.8    ahi is 53  Leak at 110 lpm    I don't have access via airview      May need oxygen      Was hospitalized 4/2017 with dx of Acute CHF  Would suggest taking Lasix (furosemide) in the day, like noon rather than at night     Was hospitalized at [facility] with [facility] in December 2020  She has recovered  Last cxr was done 12/2020    cxr done 7/7/2021     Impression   Bilateral linear opacities likely representing areas of   scarring/postinflammatory fibrosis.             Will get noct pulse ox on bipap  May need oxygen at night    Call me after the pulse ox done      Room air oxygen level was 94%, she will not need oxygen at this time during the daytime. We will go ahead and do a nocturnal pulse ox on the BiPAP.   If the oxygen levels do drop then will probably start her on oxygen at night which she can use with or without the BiPAP. She has been having difficulty keeping the BiPAP on for many years. She may need to only go to oxygen only at night. As this may be the only option for her available at this time.         Will refer to Dr. Maggie Haq for second opinion for inspire vs other options    RTC in 3 months                     Thank you for allowing me to assist in the care of the Robert Amador MD

## 2021-08-17 NOTE — PROGRESS NOTES
Hyun Qureshi (:  1960) is a 64 y.o. female,{New vs Established:[id number]::\"Established patient\"}, here for evaluation of the following chief complaint(s):  Follow-up (6 wk) and Asthma         ASSESSMENT/PLAN:  1. Obstructive sleep apnea  2. Moderate persistent asthma without complication  3. Shortness of breath  4. Class 1 obesity without serious comorbidity with body mass index (BMI) of 30.0 to 30.9 in adult, unspecified obesity type  Continue with:  symbicort 160/4.5 2puff twice a day, spacer  singulair (montelukast) once a day  Rescue albuterol (ventolin) as needed  Using spacer     Need to continue walk and exercise     Cough is better but at times more    Weight was 162 and then 157 and then up to 167 now at 145    Sleep study showed ahi of 55, this was redone 2016  Last study showed ahi of 33  bipap titration done 10/2017  Showed bipap of 11/7     dme is [employer]    Needs new straps and headgear    autobipap with ipap max of 20 and epap min of 13 and ps of 4  Using with nasal pillows  ti min of 0.3 and max 2.0  Trig and cycle medium    Using 29/30 of time  Using 2.5 h/night    90% pressure is 18.4/14.6  tv is 60  mv is 5.1    ahi is 56  Leak at 68 lpm    I don't have access via airview    Will do bipap retitration  Will need bipap titration  may need adjustments  Will give ambien to sleep with  Do this during the day, sleep study  She has special needs and will have sitter    For several reasons, one to get the correct pressure  2. For compliance issues  3. To see about oxygen needs    Will give   Nuno Cadet  Will try to do this during the day    Seen by ENT and was not a good candidate for the Petr     Was hospitalized 2017 with dx of Acute CHF  Would suggest taking Lasix (furosemide) in the day, like noon rather than at night    RTC in 4 months              No follow-ups on file.          Subjective   SUBJECTIVE/OBJECTIVE:  Using new bipap  And feeling well  However still having issues of keeping it on all night    Some Headache? always complains aobut this  No bloating  No burping  No chest pain    no ear popping    Going to bed 930 am and waking up 630    Breathing well  No chest pain    symbicort bid with spacer    Cough, no    Exercising more often, doing 2 laps    Some sob at [facility]    Was hospitalized in Feb 2020  And was on oxygen but now Using only with cpap    Overall dong well         Patient has seen Dr. Catalina Schlatter for ENT for evaluation of the inspire for her sleep apnea    ASSESSMENT:  1. LYNNETTE (obstructive sleep apnea)   2. BMI 35.0-35.9,adult   3. Intellectual disability     PLAN:  -We reviewed her history. She has severe LYNNETTE which seems to be refractory to CPAP therapy even when she is compliant. However, she overwhelmingly is noncompliant and has not been wearing her mask. Unfortunately, I feel that her options are fairly limited at this point. Other than positive pressure ventilation therapy, I feel that her next best option is likely weight loss which we discussed. From a surgical standpoint, I do not feel that complex pharyngeal, laryngeal, or mandibular surgery would be likely to result in any meaningful benefit and would also come with significant pain. She is edentulous and therefore cannot try an oral appliance. 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 The only consideration from my point of view would be if she wanted to have a repeat DISE to verify her previous pattern of collapse, although this would be unlikely to change. Patient's caregiver plans to discuss with patient's sister who is her guardian, and expects that she will likely contact me to discuss further. Review of Systems   Constitutional: Negative. HENT: Negative. Eyes: Negative. Respiratory: Negative. Cardiovascular: Negative. Gastrointestinal: Negative. Endocrine: Negative. electronic signature was used to authenticate this note.    --Kristel Cochran MD

## 2021-08-17 NOTE — PROGRESS NOTES
MA Communication:   The following orders are received by verbal communication from Alisa Simmons MD    Orders include:  Order faxed to [facility]       ENT referral given to patient       3 mo fu scheduled 11/23/21

## 2021-08-17 NOTE — PATIENT INSTRUCTIONS
ASSESSMENT/PLAN:  1. Obstructive sleep apnea  2. Moderate persistent asthma without complication  3. Shortness of breath  4. Class 1 obesity without serious comorbidity with body mass index (BMI) of 30.0 to 30.9 in adult, unspecified obesity type      Continue with:  symbicort 160/4.5 2puff twice a day, spacer  singulair (montelukast) once a day  Rescue albuterol (ventolin) as needed  Using spacer     Need to continue walk and exercise     Cough is better but at times more    Weight was 162 and then 157 and then up to 167 then at 145 and then at 161 and now at 162    Sleep study showed ahi of 55, this was redone 7/2016  Last study showed ahi of 33  bipap titration done 10/2017  Showed bipap of 11/7     dme is [employer]    Needs new straps and headgear    autobipap with ipap max of 20 and epap min of 13 and ps of 4  Using with nasal pillows  ti min of 0.3 and max 2.0  Trig and cycle medium    Using 30/30 of time  Using 5.6 h/night    90% pressure is 19.5/15.5  tv is 160  mv is 2.8    ahi is 53  Leak at 110 lpm    I don't have access via airview      May need oxygen      Was hospitalized 4/2017 with dx of Acute CHF  Would suggest taking Lasix (furosemide) in the day, like noon rather than at night     Was hospitalized at [facility] with [facility] in December 2020  She has recovered  Last cxr was done 12/2020    cxr done 7/7/2021     Impression   Bilateral linear opacities likely representing areas of   scarring/postinflammatory fibrosis.             Will get noct pulse ox on bipap  May need oxygen at night    Call me after the pulse ox done      Room air oxygen level was 94%, she will not need oxygen at this time during the daytime. We will go ahead and do a nocturnal pulse ox on the BiPAP. If the oxygen levels do drop then will probably start her on oxygen at night which she can use with or without the BiPAP. She has been having difficulty keeping the BiPAP on for many years.   She may need to only go to oxygen only at night. As this may be the only option for her available at this time. Will refer to Dr. Ozzie Aranda for second opinion for inspire vs other options    RTC in 3 months    Remember to bring a list of pulmonary medications and any CPAP or BiPAP machines to your next appointment with the office. Please keep all of your future appointments scheduled by [address], [facility] office. Out of respect for other patients and providers, you may be asked to reschedule your appointment if you arrive later than your scheduled appointment time. Appointments cancelled less than 24hrs in advance will be considered a no show. Patients with three missed appointments within 1 year or four missed appointments within 2 years can be dismissed from the practice. Please be aware that our physicians are required to work in the Intensive Care Unit at [facility].  Your appointment may need to be rescheduled if they are designated to work during your appointment time. You may receive a survey regarding the care you received during your visit. Your input is valuable to us. We encourage you to complete and return your survey. We hope you will choose us in the future for your healthcare needs. Pt instructed of all future appointment dates & times, including radiology, labs, procedures & referrals. If procedures were scheduled preparation instructions provided. Instructions on future appointments with [facility] were given.

## 2021-08-17 NOTE — PROGRESS NOTES
Roel Hoffman (:  1960) is a 64 y.o. female,Established patient, here for evaluation of the following chief complaint(s):  Follow-up (6 wk) and Asthma         ASSESSMENT/PLAN:  1. Obstructive sleep apnea  2. Moderate persistent asthma without complication  3. Shortness of breath  4. Class 1 obesity without serious comorbidity with body mass index (BMI) of 30.0 to 30.9 in adult, unspecified obesity type      Continue with:  symbicort 160/4.5 2puff twice a day, spacer  singulair (montelukast) once a day  Rescue albuterol (ventolin) as needed  Using spacer     Need to continue walk and exercise     Cough is better but at times more    Weight was 162 and then 157 and then up to 167 then at 145 and then at 161 and now at 162    Sleep study showed ahi of 55, this was redone 2016  Last study showed ahi of 33  bipap titration done 10/2017  Showed bipap of 11/7     dme is [employer]    Needs new straps and headgear    autobipap with ipap max of 20 and epap min of 13 and ps of 4  Using with nasal pillows  ti min of 0.3 and max 2.0  Trig and cycle medium    Using 30/30 of time  Using 5.6 h/night    90% pressure is 19.5/15.5  tv is 160  mv is 2.8    ahi is 53  Leak at 110 lpm    I don't have access via airview      May need oxygen      Was hospitalized 2017 with dx of Acute CHF  Would suggest taking Lasix (furosemide) in the day, like noon rather than at night     Was hospitalized at [facility] with [address] in 2020  She has recovered  Last cxr was done 2020    cxr done 2021     Impression   Bilateral linear opacities likely representing areas of   scarring/postinflammatory fibrosis.             Will get noct pulse ox on bipap  May need oxygen at night    Call me after the pulse ox done      Room air oxygen level was 94%, she will not need oxygen at this time during the daytime. We will go ahead and do a nocturnal pulse ox on the BiPAP.   If the oxygen levels do drop then will probably start her on oxygen at night which she can use with or without the BiPAP. She has been having difficulty keeping the BiPAP on for many years. She may need to only go to oxygen only at night. As this may be the only option for her available at this time. Will refer to Dr. ALISA ANSARI for second opinion for inspire vs other options    RTC in 3 months        No follow-ups on file. I have personally reviewed and summarized the old records and/or obtained further history from someone other than the patient. I have independently reviewed the images and reviewed with patient    I have reviewed the lab tests, radiology reports and medications    I have downloaded and interpreted the cpap/bipap/pap data. I have made adjustments as described    Reviewed present meds and side effects. Continue present meds. Stay compliant. Call if worsens. Reviewed proper inhaler usage    Will ask for old records              [address]  [address]  [address] [address]  Dept: [phone]  Loc: [phone]     Diagnosis:  [x] LYNNETTE (ICD-10: G47.33)  o CSA (ICD-10: G47.31)  [] Complex Sleep Apnea (ICD-10: G47.37)  []  (ICD-10: G47.37)  [x] Hypoxemia (ICD-10: R09.02)  [] COPD (J44.90)  [] Chronic Respiratory Failure with hypoxemia (J96.11)  [] Chronicr Respiratory Failure with hypercapnia (J96.12)  [] Restrictive Lung Disease (J98.4)      [] New Rx (Device Preference: _________________________)     [] Change Order       [] Replace ___________    [] Discontinue Order -  [] CPAP    [] BPAP    [] PAP    [] Oxygen   /   AMA?  [] Yes   [] No              Therapy    AHI: ________ MICHELL: ________  LOW SpO2: ________%      DME:[facility]    DEVICE / SETTINGS RAMP / COMFORT INTERFACE   []  CPAP () Pressure    Ramp: _________ min's  [] Ramp to patient preference General PAP Supply Kit  Medicaid does not cover heated tubing  ([employer])  PAP Tubing:  [] Heated ()- 1/3 mos                         [] Regular ()  1/3 mos  [] Disposable Water Chamber () - 1/6 mos  [] Disposable Filter () - 2/mo  [] Non-Disposable Filter () - 1/6 mos   []  BiLevel PAP ()           IPAP        []  BiLevel PAP with   ()       Backup Rate ()      EPAP Rate  [] Adjust FLEX to patient comfort       SUPPLEMENTAL OXYGEN  [] OXYGEN:      Liter/min: _________  [] Continuous        [] Nocturnal  [] Bleed into PAP Device      []  [employer]                   Max Press   Mask Interface Kit    [] Mask interface () - 1/3 mos  [] Nasal Cushion ()  2/mo  [] Nasal Pillows ()  -2/mo  [] Headgear ()  1/6 mos   []  AutoBiLevel () Pressure  ()      Support           []  ResMed® IVAPS EPAP  [] Overnight Oximetry on Room Air  [x] Overnight Oximetry on PAP Therapy    Target Pt Rate  Min PS      Target Va  Patient Ht  Ti Max                Ti Min        Rise time  Max PS  Trigger  Cycle  Epap  Epap max  Epap min  The patient has a history of:  [] Excessive Daytime Sleepiness  [] Insomnia  [] Impaired Cognition  [] Ischemic Heart Disease  [] Hypertension  [] Mood Disorders          [] History of Stroke  Additional Orders:______________________________________________________________________________________________________________________________________________________________________________     Full Face Mask Kit    [] Full face mask ()  1/3 mos  [] Full Face Cushion ()  1/mo  [] Headgear ()  1/6 mos                                           [] Respironics® ASV Advanced ()     EPAP Min  PS Min      EPAP Max  PS Max   Additional Supplies    [] Chin Strap ()  [] Heated Humidifier Kit ()  Mask Specifications:   [] Patient Preference      -or-            Brand:______________ Size:_______________   Type: __________________________________   [] Mask Refit:___________________________ Max Press  Ramp Time      Rate  Bi-flex: []1  []2  []3     [] Respironics® AVAPS: ()     IPAP Min  IPAP Max  Pressure Max  Epap max  Epap min  Rise time                      Avaps rate  EPAP   Additional Services    [] Annual PRN service and check of equipment  [] Routine service and check of equipment  [] Download and report compliance data   Tidal volume      Tigger                                     Rate                                         Inspiratory time                                                         The following equipment is Medically Necessary for the above stated patient. It is reasonable and necessary in reference to acceptable standards of medical practice for this condition, and is not prescribed as a convenience. Frequency of Use:    Daily                 Length of Need: 13 Months              o The patient requires BiLevel PAP and the following apply: []  The patient requires a Respiratory Assist Device (RAD) and the following apply:   o CPAP was tried and failed to meet therapeutic goals. [] CPAP was tried, but failed to meet therapeutic goals   o The prescribed mask interface has been properly fit, is the most comfortable to the patient and will be used with the BPAP device. [] The prescribed mask interface has been properly fit, is the most comfortable to the patient and will be used with the RAD.   o Current CPAP setting prevents patient from tolerating the therapy and lower CPAP settings fail to adequately control the symptoms of LYNNETTE, improve sleep quality, or reduce AHI to acceptable levels. [] Current CPAP setting prevent patient from tolerating the therapy and lower PAP settings fail to  adequately control LYNNETTE symptoms, improve sleep quality, or reduce AHI to acceptable levels.          [] There is significant improvement of the respiratory events on the RAD stimulation therapy. The only consideration from my point of view would be if she wanted to have a repeat DISE to verify her previous pattern of collapse, although this would be unlikely to change. Patient's caregiver plans to discuss with patient's sister who is her guardian, and expects that she will likely contact me to discuss further. In the office with melecio  Was hospitalized in 12/2020 with covid  Not really interested in getting covid vaccine    Asthma has been well controlled  Some exe dyspnea        Asthma  Her past medical history is significant for asthma. Review of Systems   Constitutional: Negative. HENT: Negative. Eyes: Negative. Respiratory: Negative. Cardiovascular: Negative. Gastrointestinal: Negative. Endocrine: Negative. Genitourinary: Negative. Musculoskeletal: Negative. Skin: Negative. Allergic/Immunologic: Negative. Neurological: Negative. Hematological: Negative. Psychiatric/Behavioral: Negative. Objective   Physical Exam  Vitals and nursing note reviewed. Constitutional:       General: She is not in acute distress. Appearance: Normal appearance. She is not ill-appearing. HENT:      Head: Normocephalic and atraumatic. Right Ear: External ear normal.      Left Ear: External ear normal.      Nose: Nose normal.      Mouth/Throat:      Mouth: Mucous membranes are moist.      Pharynx: Oropharynx is clear. Comments: Mallampati 3  Eyes:      General: No scleral icterus. Extraocular Movements: Extraocular movements intact. Conjunctiva/sclera: Conjunctivae normal.      Pupils: Pupils are equal, round, and reactive to light. Cardiovascular:      Rate and Rhythm: Normal rate and regular rhythm. Pulses: Normal pulses. Heart sounds: Normal heart sounds. No murmur heard. No friction rub. Pulmonary:      Effort: Pulmonary effort is normal. No respiratory distress.       Breath sounds: Normal breath sounds. No stridor. No wheezing, rhonchi or rales. Chest:      Chest wall: No tenderness. Abdominal:      General: Abdomen is flat. Bowel sounds are normal. There is no distension. Tenderness: There is no abdominal tenderness. There is no guarding. Musculoskeletal:         General: No swelling or tenderness. Normal range of motion. Cervical back: Normal range of motion and neck supple. No rigidity. Skin:     General: Skin is warm and dry. Coloration: Skin is not jaundiced. Neurological:      General: No focal deficit present. Mental Status: She is alert and oriented to person, place, and time. Mental status is at baseline. Cranial Nerves: No cranial nerve deficit. Sensory: No sensory deficit. Motor: No weakness. Gait: Gait normal.   Psychiatric:         Mood and Affect: Mood normal.         Thought Content:  Thought content normal.         Judgment: Judgment normal.                  An electronic signature was used to authenticate this note.    --Rogelio Elliott MD

## 2021-08-18 ENCOUNTER — TELEPHONE (OUTPATIENT)
Dept: PULMONOLOGY | Age: 61
End: 2021-08-18

## 2021-08-18 NOTE — TELEPHONE ENCOUNTER
Janie Luna from [facility] called and said the order that was faxed to them yesterday does not contain any orders on it. Please check and send new order if appropriate.

## 2021-08-18 NOTE — TELEPHONE ENCOUNTER
[address] [facility]  [address]  [address] [facility] [place] [address]  Dept: [phone]  Loc: [phone]     Diagnosis:  [x] LYNNETTE (ICD-10: G47.33)  o CSA (ICD-10: G47.31)  [] Complex Sleep Apnea (ICD-10: G47.37)  []  (ICD-10: G47.37)  [] Hypoxemia (ICD-10: R09.02)  [] COPD (J44.90)  [] Chronic Respiratory Failure with hypoxemia (J96.11)  [] Chronicr Respiratory Failure with hypercapnia (J96.12)  [] Restrictive Lung Disease (J98.4)      [] New Rx (Device Preference: _________________________)     [] Change Order       [] Replace ___________    [] Discontinue Order -  [] CPAP    [] BPAP    [] PAP    [] Oxygen   /   AMA?  [] Yes   [] No              Therapy    AHI: ________ MICHELL: ________  LOW SpO2: ________%      DME: [facility]    DEVICE / SETTINGS RAMP / COMFORT INTERFACE   []  CPAP () Pressure    Ramp: _________ min's  [] Ramp to patient preference General PAP Supply Kit  Medicaid does not cover heated tubing  ([employer])  PAP Tubing:  [x] Heated ()- 1/3 mos                         [x] Regular () - 1/3 mos  [x] Disposable Water Chamber () - 1/6 mos  [x] Disposable Filter () - 2/mo  [x] Non-Disposable Filter () - 1/6 mos   []  BiLevel PAP ()           IPAP        []  BiLevel PAP with   ()       Backup Rate ()      EPAP Rate  [] Adjust FLEX to patient comfort       SUPPLEMENTAL OXYGEN  [] OXYGEN:      Liter/min: _________  [] Continuous        [] Nocturnal  [] Bleed into PAP Device      []  [address] Kit    [x] Mask interface () - 1/3 mos  [x] Nasal Cushion () - 2/mo  [x] Nasal Pillows ()  -2/mo  [x] Headgear () - 1/6 mos   []  AutoBiLevel () Pressure  ()      Support           []  ResMed® IVAPS EPAP  [] Overnight Oximetry on Room Air  [] Overnight Oximetry on PAP Therapy    Target Pt Rate  Min PS      Target Va  Patient Ht  Ti Max                Ti Min        Rise time  Max PS  Trigger  Cycle  Epap  Epap max  Epap min  The patient has a history of:  [] Excessive Daytime Sleepiness  [] Insomnia  [] Impaired Cognition  [] Ischemic Heart Disease  [] Hypertension  [] Mood Disorders          [] History of Stroke  Additional Orders:______________________________________________________________________________________________________________________________________________________________________________     Full Face Mask Kit    [] Full face mask () - 1/3 mos  [] Full Face Cushion () - 1/mo  [] Headgear () - 1/6 mos                                           [] Respironics® ASV Advanced ()     EPAP Min  PS Min      EPAP Max  PS Max   Additional Supplies    [] Chin Strap ()  [] Heated Humidifier Kit ()  Mask Specifications:   [] Patient Preference      -or-            Brand:______________ Size:_______________   Type: __________________________________   [] Mask Refit:___________________________                                           Max Press  Ramp Time      Rate  Bi-flex: []1  []2  []3     [] Respironics® AVAPS: ()     IPAP Min  IPAP Max  Pressure Max  Epap max  Epap min  Rise time                      Avaps rate  EPAP   Additional Services    [] Annual PRN service and check of equipment  [] Routine service and check of equipment  [] Download and report compliance data   Tidal volume      Tigger                                     Rate                                         Inspiratory time                                                         The following equipment is Medically Necessary for the above stated patient. It is reasonable and necessary in reference to acceptable standards of medical practice for this condition, and is not prescribed as a convenience.            Frequency of Use:    Daily                 Length of Need: 13 Months              o The patient requires BiLevel PAP and the

## 2021-08-24 ENCOUNTER — OFFICE VISIT (OUTPATIENT)
Dept: ENT CLINIC | Age: 61
End: 2021-08-24
Payer: MEDICARE

## 2021-08-24 VITALS
TEMPERATURE: 97.3 F | WEIGHT: 165 LBS | DIASTOLIC BLOOD PRESSURE: 55 MMHG | HEIGHT: 61 IN | HEART RATE: 69 BPM | RESPIRATION RATE: 17 BRPM | BODY MASS INDEX: 31.15 KG/M2 | SYSTOLIC BLOOD PRESSURE: 120 MMHG

## 2021-08-24 DIAGNOSIS — G47.33 OSA (OBSTRUCTIVE SLEEP APNEA): Primary | ICD-10-CM

## 2021-08-24 PROCEDURE — 99203 OFFICE O/P NEW LOW 30 MIN: CPT | Performed by: OTOLARYNGOLOGY

## 2021-08-24 PROCEDURE — G8417 CALC BMI ABV UP PARAM F/U: HCPCS | Performed by: OTOLARYNGOLOGY

## 2021-08-24 PROCEDURE — 3017F COLORECTAL CA SCREEN DOC REV: CPT | Performed by: OTOLARYNGOLOGY

## 2021-08-24 PROCEDURE — 1036F TOBACCO NON-USER: CPT | Performed by: OTOLARYNGOLOGY

## 2021-08-24 PROCEDURE — G8427 DOCREV CUR MEDS BY ELIG CLIN: HCPCS | Performed by: OTOLARYNGOLOGY

## 2021-08-24 ASSESSMENT — ENCOUNTER SYMPTOMS
APNEA: 0
VOICE CHANGE: 0
TROUBLE SWALLOWING: 0
SHORTNESS OF BREATH: 0
EYE ITCHING: 0
FACIAL SWELLING: 0
SINUS PRESSURE: 0
COUGH: 0
SORE THROAT: 0

## 2021-08-24 NOTE — PROGRESS NOTES
[facility] [address], [address]  [place], [place] [place]ony [place]aine[place]  P: [phone]       Patient     Reese [place]  1960    ChiefComplaint     Chief Complaint   Patient presents with    New Patient     Here today for persistant sleep apnea, more than 5 years- Pulmonologist Dr. Radha Yancey- If there are more options than mask, still has a daniella of issues even after mask has been adjusted- not a good candidate for inspire       History of Present Illness     Jess is 71-year-old female here today with her caregiver for evaluation of sleep severe LYNNETTE. Is currently following with Dr. Braulio Mayfield pulmonologist.  Has previously been evaluated for inspire in 2017 was found to have circumferential collapse. Recently evaluated by Dr. Juan Jimenez in May 2021 for second opinion. Options discussed included repeat sleep endoscopy versus ongoing trial of CPAP. She continues to attempt use of CPAP but has difficulties with this as she has frequent nighttime awakenings and forgets to replace her machine. Has 24-hour nursing care and they will replace it for her at night but patient states constantly slips and is currently broken. Here today to discuss options.     Past Medical History     Past Medical History:   Diagnosis Date    Asthma     Blind left eye     Cardiomyopathy ([address]     CHF (congestive heart failure) ([address]     COVID-19 12/20/2020    Depression     Diabetes     Dizziness and giddiness     GALLAGHER (dyspnea on exertion)     Fractures     Hyperthyroidism     Hypoxemia     Mental retardation     Mitral valve disorder     Mixed hyperlipidemia     Profound impairment, one eye, impairment level not further specified     Sleep apnea     Toxic uninodular goiter     Vitamin D deficiency        Past Surgical History     Past Surgical History:   Procedure Laterality Date    CHOLECYSTECTOMY      COLONOSCOPY      DIAGNOSTIC CARDIAC CATH LAB PROCEDURE      HYSTERECTOMY         Family History     Family History   Problem Relation Age of Onset    Ovarian Cancer Mother     Lung Cancer Father     Breast Cancer Sister     Cervical Cancer Sister        Social History     Social History     Tobacco Use    Smoking status: Never Smoker    Smokeless tobacco: Never Used   Vaping Use    Vaping Use: Never used   Substance Use Topics    Alcohol use: No    Drug use: No        Allergies     Allergies   Allergen Reactions    Penicillins        Medications     Current Outpatient Medications   Medication Sig Dispense Refill    fluticasone (FLONASE) 50 MCG/ACT nasal spray fluticasone propionate 50 mcg/actuation nasal spray,suspension      fluticasone-salmeterol (ADVAIR DISKUS) 250-50 MCG/DOSE AEPB Advair Diskus 250 mcg-50 mcg/dose powder for inhalation      estradiol (CLIMARA) 0.025 MG/24HR Place 1 patch onto the skin once a week      isosorbide mononitrate (IMDUR) 30 MG extended release tablet Take 30 mg by mouth daily       metoprolol succinate (TOPROL XL) 25 MG extended release tablet Take 25 mg by mouth daily      losartan (COZAAR) 25 MG tablet Take 50 mg by mouth daily      budesonide-formoterol (SYMBICORT) 160-4.5 MCG/ACT AERO Inhale 2 puffs into the lungs 2 times daily      levETIRAcetam (KEPPRA) 500 MG tablet Take 1 tablet by mouth 2 times daily 60 tablet 3    aspirin 81 MG chewable tablet Take 1 tablet by mouth daily 30 tablet 3    magnesium oxide (MAG-OX) 400 MG tablet Take 400 mg by mouth daily      Cholecalciferol 50 MCG (2000 UT) CAPS Take 2,000 Units by mouth daily       hydrocortisone 1 % cream Apply topically 2 times daily as needed (apply topically for athlete's foot)      furosemide (LASIX) 40 MG tablet Take 40 mg by mouth 2 times daily      buPROPion (WELLBUTRIN SR) 200 MG extended release tablet Take 200 mg by mouth daily      montelukast (SINGULAIR) 10 MG tablet Take 10 mg by mouth nightly       VENTOLIN  (90 BASE) MCG/ACT inhaler Inhale 2 puffs into the lungs every 6 hours as needed       NEXIUM 40 MG delayed release capsule Take 40 mg by mouth every morning (before breakfast)       clomiPRAMINE (ANAFRANIL) 75 MG capsule Take 150 mg by mouth nightly       atorvastatin (LIPITOR) 10 MG tablet Take 10 mg by mouth nightly       aripiprazole (ABILIFY) 5 MG tablet Take 5 mg by mouth Daily with lunch       Estradiol 0.025 MG/24HR PTTW Place 1 patch onto the skin once a week       metoclopramide (REGLAN) 5 MG tablet Take 5 mg by mouth 4 times daily (before meals and nightly)        No current facility-administered medications for this visit. Review of Systems     Review of Systems   Constitutional: Negative for appetite change, chills, fatigue, fever and unexpected weight change. HENT: Negative for congestion, ear discharge, ear pain, facial swelling, hearing loss, nosebleeds, postnasal drip, sinus pressure, sneezing, sore throat, tinnitus, trouble swallowing and voice change. Eyes: Negative for itching. Respiratory: Negative for apnea, cough and shortness of breath. Endocrine: Negative for cold intolerance and heat intolerance. Musculoskeletal: Negative for myalgias and neck pain. Skin: Negative for rash. Allergic/Immunologic: Negative for environmental allergies. Neurological: Negative for dizziness and headaches. Psychiatric/Behavioral: Negative for confusion, decreased concentration and sleep disturbance. PhysicalExam     Vitals:    08/24/21 1359   BP: (!) 120/55   Site: Left Lower Arm   Position: Sitting   Cuff Size: Medium Adult   Pulse: 69   Resp: 17   Temp: 97.3 °F (36.3 °C)   TempSrc: Infrared   Weight: 165 lb (74.8 kg)   Height: 5' 1\" (1.549 m)       Physical Exam  Constitutional:       General: She is not in acute distress. Appearance: She is well-developed. HENT:      Head: Normocephalic and atraumatic. Right Ear: Tympanic membrane, ear canal and external ear normal. No drainage. No middle ear effusion.  Tympanic membrane is not bulging. Tympanic membrane has normal mobility. Left Ear: Tympanic membrane, ear canal and external ear normal. No drainage. No middle ear effusion. Tympanic membrane is not bulging. Tympanic membrane has normal mobility. Nose: No septal deviation, mucosal edema or rhinorrhea. Mouth/Throat:      Lips: Pink. Mouth: Mucous membranes are moist.      Tongue: No lesions. Palate: No mass. Pharynx: Uvula midline. Eyes:      Pupils: Pupils are equal, round, and reactive to light. Neck:      Thyroid: No thyroid mass or thyromegaly. Trachea: Trachea and phonation normal.   Cardiovascular:      Pulses: Normal pulses. Pulmonary:      Effort: Pulmonary effort is normal. No accessory muscle usage or respiratory distress. Breath sounds: No stridor. Musculoskeletal:      Cervical back: Full passive range of motion without pain. Lymphadenopathy:      Head:      Right side of head: No submental or submandibular adenopathy. Left side of head: No submental or submandibular adenopathy. Cervical: No cervical adenopathy. Right cervical: No superficial, deep or posterior cervical adenopathy. Left cervical: No superficial, deep or posterior cervical adenopathy. Skin:     General: Skin is warm and dry. Neurological:      Mental Status: She is alert and oriented to person, place, and time. Cranial Nerves: No cranial nerve deficit. Coordination: Coordination normal.      Gait: Gait normal.   Psychiatric:         Thought Content: Thought content normal.           Assessment and Plan     1. LYNNETTE (obstructive sleep apnea)  Jess is 75-year-old female with severe LYNNETTE with difficulty utilizing CPAP. Has had extensive evaluation previously. Had sleep endoscopy for inspire eval in 2017 which demonstrated concentric collapse and therefore pulled out her as a candidate.  I feel any other upper airway procedure would have high morbidity for her and would unlikely offer significant benefit. Tracheostomy is gold standard for LYNNETTE treatment but feel this would be aggressive given patient's lack of daytime symptoms. Recommend she continue to work with CPAP for management of sleep apnea      Demi Parker DO  8/24/21      Portions of this note were dictated using Dragon.  There may be linguistic errors secondary to the use of this program.

## 2021-11-23 ENCOUNTER — OFFICE VISIT (OUTPATIENT)
Dept: PULMONOLOGY | Age: 61
End: 2021-11-23
Payer: MEDICARE

## 2021-11-23 VITALS
WEIGHT: 163 LBS | RESPIRATION RATE: 16 BRPM | OXYGEN SATURATION: 94 % | BODY MASS INDEX: 30.78 KG/M2 | TEMPERATURE: 97.7 F | HEART RATE: 74 BPM | HEIGHT: 61 IN | DIASTOLIC BLOOD PRESSURE: 69 MMHG | SYSTOLIC BLOOD PRESSURE: 126 MMHG

## 2021-11-23 DIAGNOSIS — G47.33 OBSTRUCTIVE SLEEP APNEA: Primary | ICD-10-CM

## 2021-11-23 DIAGNOSIS — R05.9 COUGH: ICD-10-CM

## 2021-11-23 DIAGNOSIS — R06.02 SHORTNESS OF BREATH: ICD-10-CM

## 2021-11-23 DIAGNOSIS — E66.9 CLASS 1 OBESITY WITHOUT SERIOUS COMORBIDITY WITH BODY MASS INDEX (BMI) OF 30.0 TO 30.9 IN ADULT, UNSPECIFIED OBESITY TYPE: ICD-10-CM

## 2021-11-23 DIAGNOSIS — J45.40 MODERATE PERSISTENT ASTHMA WITHOUT COMPLICATION: ICD-10-CM

## 2021-11-23 PROCEDURE — 99214 OFFICE O/P EST MOD 30 MIN: CPT | Performed by: INTERNAL MEDICINE

## 2021-11-23 PROCEDURE — G8417 CALC BMI ABV UP PARAM F/U: HCPCS | Performed by: INTERNAL MEDICINE

## 2021-11-23 PROCEDURE — 3017F COLORECTAL CA SCREEN DOC REV: CPT | Performed by: INTERNAL MEDICINE

## 2021-11-23 PROCEDURE — G8427 DOCREV CUR MEDS BY ELIG CLIN: HCPCS | Performed by: INTERNAL MEDICINE

## 2021-11-23 PROCEDURE — 1036F TOBACCO NON-USER: CPT | Performed by: INTERNAL MEDICINE

## 2021-11-23 PROCEDURE — G8484 FLU IMMUNIZE NO ADMIN: HCPCS | Performed by: INTERNAL MEDICINE

## 2021-11-23 RX ORDER — LORATADINE 10 MG/1
10 CAPSULE, LIQUID FILLED ORAL DAILY
COMMUNITY

## 2021-11-23 ASSESSMENT — ENCOUNTER SYMPTOMS
ALLERGIC/IMMUNOLOGIC NEGATIVE: 1
GASTROINTESTINAL NEGATIVE: 1
RESPIRATORY NEGATIVE: 1
EYES NEGATIVE: 1

## 2021-11-23 ASSESSMENT — SLEEP AND FATIGUE QUESTIONNAIRES
NECK CIRCUMFERENCE (INCHES): 16.75
HOW LIKELY ARE YOU TO NOD OFF OR FALL ASLEEP WHILE LYING DOWN TO REST IN THE AFTERNOON WHEN CIRCUMSTANCES PERMIT: 3
HOW LIKELY ARE YOU TO NOD OFF OR FALL ASLEEP WHEN YOU ARE A PASSENGER IN A CAR FOR AN HOUR WITHOUT A BREAK: 3
HOW LIKELY ARE YOU TO NOD OFF OR FALL ASLEEP IN A CAR, WHILE STOPPED FOR A FEW MINUTES IN TRAFFIC: 0
HOW LIKELY ARE YOU TO NOD OFF OR FALL ASLEEP WHILE SITTING QUIETLY AFTER LUNCH WITHOUT ALCOHOL: 3
HOW LIKELY ARE YOU TO NOD OFF OR FALL ASLEEP WHILE SITTING AND TALKING TO SOMEONE: 0
HOW LIKELY ARE YOU TO NOD OFF OR FALL ASLEEP WHILE SITTING AND READING: 1
HOW LIKELY ARE YOU TO NOD OFF OR FALL ASLEEP WHILE WATCHING TV: 3
HOW LIKELY ARE YOU TO NOD OFF OR FALL ASLEEP WHILE SITTING INACTIVE IN A PUBLIC PLACE: 3
ESS TOTAL SCORE: 16

## 2021-11-23 NOTE — LETTER
11/23/21        Jess Duffy      I have seen this patient in the office today and wanted to communicate my findings and recommendations. Patient Instructions          ASSESSMENT/PLAN:  1. Obstructive sleep apnea  2. Moderate persistent asthma without complication  3. Shortness of breath  4. Class 1 obesity without serious comorbidity with body mass index (BMI) of 30.0 to 30.9 in adult, unspecified obesity type  5. Cough      Continue with:  symbicort 160/4.5 2puff twice a day, spacer  singulair (montelukast) once a day  Rescue albuterol (ventolin) as needed  Using spacer     Need to continue walk and exercise     Cough is better but at times more    Weight was 162 and then 157 and then up to 167 then at 145 and then at 161 and now at 162    Sleep study showed ahi of 55, this was redone 7/2016  Last study showed ahi of 33  bipap titration done 10/2017  Showed bipap of 11/7     dme is [employer]    Needs new straps and headgear    autobipap with ipap max of 20 and epap min of 13 and ps of 4  Using with nasal pillows  ti min of 0.3 and max 2.0  Trig and cycle medium  Using with oxygen at 2- 3 lpm    Using 30/30 of time  Using 4 h/night    90% pressure is 19.1/15.2  tv is 160  mv is 2.8    ahi is 49  Leak at 116 lpm    I don't have access via airview      May need oxygen      Was hospitalized 4/2017 with dx of Acute CHF  Would suggest taking Lasix (furosemide) in the day, like noon rather than at night     Was hospitalized at [facility] with [place] in December 2020  She has recovered  Last cxr was done 12/2020    cxr done 7/7/2021     Impression   Bilateral linear opacities likely representing areas of   scarring/postinflammatory fibrosis.                 Room air oxygen level was 94%, she will not need oxygen at this time during the daytime.       Seen by Dr. Zechariah Ayala for [facility] but still not a candidate for inspire      The sleep apnea seen on the bipap may not be accurate, as the patient feels better and sleep studies did not show great control  However overall doing well    Would suggest COVID vaccination  She would not do well with getting covid        RTC in 4 months                         Thank you for allowing me to assist in the care of the Andrews Rose MD

## 2021-11-23 NOTE — PROGRESS NOTES
Vy Fortune (:  1960) is a 64 y.o. female,Established patient, here for evaluation of the following chief complaint(s):  Sleep Apnea (3 mo fu)         ASSESSMENT/PLAN:  1. Obstructive sleep apnea  2. Moderate persistent asthma without complication  3. Shortness of breath  4. Class 1 obesity without serious comorbidity with body mass index (BMI) of 30.0 to 30.9 in adult, unspecified obesity type  5. Cough      Continue with:  symbicort 160/4.5 2puff twice a day, spacer  singulair (montelukast) once a day  Rescue albuterol (ventolin) as needed  Using spacer     Need to continue walk and exercise     Cough is better but at times more    Weight was 162 and then 157 and then up to 167 then at 145 and then at 161 and now at 162    Sleep study showed ahi of 55, this was redone 2016  Last study showed ahi of 33  bipap titration done 10/2017  Showed bipap of 11/7     dme is [employer]    Needs new straps and headgear    autobipap with ipap max of 20 and epap min of 13 and ps of 4  Using with nasal pillows  ti min of 0.3 and max 2.0  Trig and cycle medium  Using with oxygen at 2- 3 lpm    Using 30/30 of time  Using 4 h/night    90% pressure is 19.1/15.2  tv is 160  mv is 2.8    ahi is 49  Leak at 116 lpm    I don't have access via airview      May need oxygen      Was hospitalized 2017 with dx of Acute CHF  Would suggest taking Lasix (furosemide) in the day, like noon rather than at night     Was hospitalized at [facility] with [facility] in 2020  She has recovered  Last cxr was done 2020    cxr done 2021     Impression   Bilateral linear opacities likely representing areas of   scarring/postinflammatory fibrosis.                 Room air oxygen level was 94%, she will not need oxygen at this time during the daytime.       Seen by Dr. Eliseo Wilson for [facility] but still not a candidate for inspire      The sleep apnea seen on the bipap may not be accurate, as the patient feels better and sleep studies did () - 1/6 mos   []  BiLevel PAP ()           IPAP        []  BiLevel PAP with   ()       Backup Rate ()      EPAP Rate  [] Adjust FLEX to patient comfort       SUPPLEMENTAL OXYGEN  [] OXYGEN:      Liter/min: _________  [] Continuous        [] Nocturnal  [] Bleed into PAP Device      []  EchoStar  Min Press                   Max Press   Mask Interface Kit    [x] Mask interface () - 1/3 mos  [x] Nasal Cushion ()  2/mo  [x] Nasal Pillows ()  -2/mo  [x] Headgear ()  1/6 mos   []  AutoBiLevel () Pressure  ()      Support           []  ResMed® IVAPS EPAP  [] Overnight Oximetry on Room Air  [] Overnight Oximetry on PAP Therapy    Target Pt Rate  Min PS      Target Va  Patient Ht  Ti Max                Ti Min        Rise time  Max PS  Trigger  Cycle  Epap  Epap max  Epap min  The patient has a history of:  [] Excessive Daytime Sleepiness  [] Insomnia  [] Impaired Cognition  [] Ischemic Heart Disease  [] Hypertension  [] Mood Disorders          [] History of Stroke  Additional Orders:______________________________________________________________________________________________________________________________________________________________________________     Full Face Mask Kit    [] Full face mask ()  1/3 mos  [] Full Face Cushion ()  1/mo  [] Headgear ()  1/6 mos                                           [] Respironics® ASV Advanced ()     EPAP Min  PS Min      EPAP Max  PS Max   Additional Supplies    [] Chin Strap ()  [] Heated Humidifier Kit ()  Mask Specifications:   [] Patient Preference      -or-            Brand:______________ Size:_______________   Type: __________________________________   [] Mask Refit:___________________________                                           Max Press  Ramp Time      Rate  Bi-flex: []1  []2  []3     [] Respironics® AVAPS: (E047)     IPAP Min  IPAP Max  Pressure Max  Epap max  Epap min  Rise time Avaps rate  EPAP   Additional Services    [] Annual PRN service and check of equipment  [] Routine service and check of equipment  [] Download and report compliance data   Tidal volume      Tigger                                     Rate                                         Inspiratory time                                                         The following equipment is Medically Necessary for the above stated patient. It is reasonable and necessary in reference to acceptable standards of medical practice for this condition, and is not prescribed as a convenience. Frequency of Use:    Daily                 Length of Need: 13 Months              o The patient requires BiLevel PAP and the following apply: []  The patient requires a Respiratory Assist Device (RAD) and the following apply:   o CPAP was tried and failed to meet therapeutic goals. [] CPAP was tried, but failed to meet therapeutic goals   o The prescribed mask interface has been properly fit, is the most comfortable to the patient and will be used with the BPAP device. [] The prescribed mask interface has been properly fit, is the most comfortable to the patient and will be used with the RAD.   o Current CPAP setting prevents patient from tolerating the therapy and lower CPAP settings fail to adequately control the symptoms of LYNNETTE, improve sleep quality, or reduce AHI to acceptable levels. [] Current CPAP setting prevent patient from tolerating the therapy and lower PAP settings fail to  adequately control LYNNETTE symptoms, improve sleep quality, or reduce AHI to acceptable levels.          [] There is significant improvement of the respiratory events on the RAD                                                                                                                                                                                                                                  Capri Maldonado MD               NPI- [id number] distension. Tenderness: There is no abdominal tenderness. There is no guarding. Musculoskeletal:         General: No swelling or tenderness. Normal range of motion. Cervical back: Normal range of motion and neck supple. No rigidity. Skin:     General: Skin is warm and dry. Coloration: Skin is not jaundiced. Neurological:      General: No focal deficit present. Mental Status: She is alert and oriented to person, place, and time. Mental status is at baseline. Cranial Nerves: No cranial nerve deficit. Sensory: No sensory deficit. Motor: No weakness. Gait: Gait normal.   Psychiatric:         Mood and Affect: Mood normal.         Thought Content:  Thought content normal.         Judgment: Judgment normal.                  An electronic signature was used to authenticate this note.    --Anselmo Watson MD

## 2021-11-23 NOTE — PROGRESS NOTES
MA Communication:   The following orders are received by verbal communication from Filiberto Mcqueen MD    Orders include:  Order faxed to [id number]       4 mo fu scheduled 3/15/22

## 2021-11-23 NOTE — PATIENT INSTRUCTIONS
ASSESSMENT/PLAN:  1. Obstructive sleep apnea  2. Moderate persistent asthma without complication  3. Shortness of breath  4. Class 1 obesity without serious comorbidity with body mass index (BMI) of 30.0 to 30.9 in adult, unspecified obesity type  5. Cough      Continue with:  symbicort 160/4.5 2puff twice a day, spacer  singulair (montelukast) once a day  Rescue albuterol (ventolin) as needed  Using spacer     Need to continue walk and exercise     Cough is better but at times more    Weight was 162 and then 157 and then up to 167 then at 145 and then at 161 and now at 162    Sleep study showed ahi of 55, this was redone 7/2016  Last study showed ahi of 33  bipap titration done 10/2017  Showed bipap of 11/7     dme is [employer]    Needs new straps and headgear    autobipap with ipap max of 20 and epap min of 13 and ps of 4  Using with nasal pillows  ti min of 0.3 and max 2.0  Trig and cycle medium  Using with oxygen at 2- 3 lpm    Using 30/30 of time  Using 4 h/night    90% pressure is 19.1/15.2  tv is 160  mv is 2.8    ahi is 49  Leak at 116 lpm    I don't have access via airview      May need oxygen      Was hospitalized 4/2017 with dx of Acute CHF  Would suggest taking Lasix (furosemide) in the day, like noon rather than at night     Was hospitalized at [facility] with [address] in December 2020  She has recovered  Last cxr was done 12/2020    cxr done 7/7/2021     Impression   Bilateral linear opacities likely representing areas of   scarring/postinflammatory fibrosis.                 Room air oxygen level was 94%, she will not need oxygen at this time during the daytime.       Seen by Dr. Amalia Ring for [facility] but still not a candidate for inspire      The sleep apnea seen on the bipap may not be accurate, as the patient feels better and sleep studies did not show great control  However overall doing well    Would suggest COVID vaccination  She would not do well with getting covid        RTC in 4 months    Remember to bring a list of pulmonary medications and any CPAP or BiPAP machines to your next appointment with the office. Please keep all of your future appointments scheduled by [address] office. Out of respect for other patients and providers, you may be asked to reschedule your appointment if you arrive later than your scheduled appointment time. Appointments cancelled less than 24hrs in advance will be considered a no show. Patients with three missed appointments within 1 year or four missed appointments within 2 years can be dismissed from the practice. Please be aware that our physicians are required to work in the Intensive Care Unit at [facility].  Your appointment may need to be rescheduled if they are designated to work during your appointment time. You may receive a survey regarding the care you received during your visit. Your input is valuable to us. We encourage you to complete and return your survey. We hope you will choose us in the future for your healthcare needs. Pt instructed of all future appointment dates & times, including radiology, labs, procedures & referrals. If procedures were scheduled preparation instructions provided. Instructions on future appointments with [facility] were given.

## 2022-01-19 RX ORDER — MONTELUKAST SODIUM 10 MG/1
TABLET ORAL
Qty: 30 TABLET | Refills: 3 | Status: SHIPPED | OUTPATIENT
Start: 2022-01-19 | End: 2022-05-16

## 2022-03-09 ENCOUNTER — TELEPHONE (OUTPATIENT)
Dept: PULMONOLOGY | Age: 62
End: 2022-03-09

## 2022-03-09 RX ORDER — BUDESONIDE AND FORMOTEROL FUMARATE DIHYDRATE 160; 4.5 UG/1; UG/1
2 AEROSOL RESPIRATORY (INHALATION) 2 TIMES DAILY
Qty: 1 EACH | Refills: 3 | Status: SHIPPED | OUTPATIENT
Start: 2022-03-09 | End: 2022-06-09 | Stop reason: CLARIF

## 2022-03-09 NOTE — TELEPHONE ENCOUNTER
PA done thru [facility] for Budesonide- Formoterol Fumarate waiting on determination.

## 2022-03-09 NOTE — TELEPHONE ENCOUNTER
Per request from guardian note faxed to care team at [phone] stating that pt is stopping the advair and to start the symbicort.

## 2022-03-09 NOTE — TELEPHONE ENCOUNTER
Spoke with patient's guardian and informed of Dr. Steffi Almazan response and the script sent to the pharmacy. They will call back with any further questions or concerns.

## 2022-03-10 NOTE — TELEPHONE ENCOUNTER
PA approved. Spoke with Kiara Hammond who is part of the care team for Jess and informed that medication was approved. Approval also faxed to pharmacy as I was unable to reach them by phone. Approval scanned and attached.

## 2022-05-16 RX ORDER — MONTELUKAST SODIUM 10 MG/1
TABLET ORAL
Qty: 31 TABLET | Refills: 11 | Status: SHIPPED | OUTPATIENT
Start: 2022-05-16

## 2022-06-09 ENCOUNTER — OFFICE VISIT (OUTPATIENT)
Dept: PULMONOLOGY | Age: 62
End: 2022-06-09
Payer: MEDICARE

## 2022-06-09 VITALS
HEIGHT: 61 IN | SYSTOLIC BLOOD PRESSURE: 108 MMHG | DIASTOLIC BLOOD PRESSURE: 69 MMHG | HEART RATE: 77 BPM | BODY MASS INDEX: 29.45 KG/M2 | WEIGHT: 156 LBS | OXYGEN SATURATION: 94 % | TEMPERATURE: 98.7 F | RESPIRATION RATE: 16 BRPM

## 2022-06-09 DIAGNOSIS — E66.9 CLASS 1 OBESITY WITHOUT SERIOUS COMORBIDITY WITH BODY MASS INDEX (BMI) OF 30.0 TO 30.9 IN ADULT, UNSPECIFIED OBESITY TYPE: ICD-10-CM

## 2022-06-09 DIAGNOSIS — G47.33 OBSTRUCTIVE SLEEP APNEA: Primary | ICD-10-CM

## 2022-06-09 DIAGNOSIS — R06.02 SHORTNESS OF BREATH: ICD-10-CM

## 2022-06-09 DIAGNOSIS — U07.1 COVID-19: ICD-10-CM

## 2022-06-09 DIAGNOSIS — J45.40 MODERATE PERSISTENT ASTHMA WITHOUT COMPLICATION: ICD-10-CM

## 2022-06-09 PROCEDURE — 99214 OFFICE O/P EST MOD 30 MIN: CPT | Performed by: INTERNAL MEDICINE

## 2022-06-09 PROCEDURE — 1036F TOBACCO NON-USER: CPT | Performed by: INTERNAL MEDICINE

## 2022-06-09 PROCEDURE — G8417 CALC BMI ABV UP PARAM F/U: HCPCS | Performed by: INTERNAL MEDICINE

## 2022-06-09 PROCEDURE — 3017F COLORECTAL CA SCREEN DOC REV: CPT | Performed by: INTERNAL MEDICINE

## 2022-06-09 PROCEDURE — G8427 DOCREV CUR MEDS BY ELIG CLIN: HCPCS | Performed by: INTERNAL MEDICINE

## 2022-06-09 RX ORDER — ALBUTEROL SULFATE 90 UG/1
AEROSOL, METERED RESPIRATORY (INHALATION)
Qty: 18 G | Refills: 10 | Status: SHIPPED | OUTPATIENT
Start: 2022-06-09

## 2022-06-09 ASSESSMENT — ENCOUNTER SYMPTOMS
EYES NEGATIVE: 1
RESPIRATORY NEGATIVE: 1
ALLERGIC/IMMUNOLOGIC NEGATIVE: 1
GASTROINTESTINAL NEGATIVE: 1

## 2022-06-09 NOTE — LETTER
[facility] Pulmonary Critical Care and Sleep  [address] [address]  Phone: [phone]  Fax: [phone]    Rosie Miranda MD        June 9, 2022     Patient: Carmela Noel   YOB: 1960   Date of Visit: 6/9/2022 6/9/22        Tawanda Duffy      I have seen this patient in the office today and wanted to communicate my findings and recommendations. Patient Instructions          ASSESSMENT/PLAN:  1. Obstructive sleep apnea  2. Moderate persistent asthma without complication  3. Shortness of breath  4. Class 1 obesity without serious comorbidity with body mass index (BMI) of 30.0 to 30.9 in adult, unspecified obesity type  5.  COVID-19      Continue with:  symbicort 160/4.5 2puff twice a day, spacer  singulair (montelukast) once a day  Rescue albuterol (ventolin) as needed  Using spacer     Will stop the symbicort for 2 weeks  Call back in  2 weeks    May have issues with symbicort and the throat, had same problem with advair    Need to continue walk and exercise       Weight was 162 and then 157 and then up to 167 then at 145 and then at 161 and then at 162 and now at 156    Sleep study showed ahi of 55, this was redone 7/2016  Last study showed ahi of 33  bipap titration done 10/2017  Showed bipap of 11/7     dme is [employer]    Needs new straps and headgear    autobipap with ipap max of 20 and epap min of 13 and ps of 4  Using with nasal pillows  ti min of 0.3 and max 2.0  Trig and cycle medium  Using with oxygen at 2- 3 lpm    Using 30/30 of time  Using 4.3 h/night    90% pressure is 19.9/16  tv is 100  mv is 2.1    ahi is 60  Leak at 38 lpm    I don't have access via airview    Will stop bipap for now    Will do oxygen at 3 lpm at night  Will get noct pulse ox 3 lpm of oxygen          Was hospitalized 4/2017 with dx of Acute CHF  Would suggest taking Lasix (furosemide) in the day, like noon rather than at night     Was hospitalized at [facility] with Covid in December 2020  She has recovered  Last cxr was done 12/2020    cxr done 7/7/2021     Impression   Bilateral linear opacities likely representing areas of   scarring/postinflammatory fibrosis.           Would suggest increase the diuresis         Room air oxygen level was 94%, she will not need oxygen at this time during the daytime.       Seen by Dr. Tomi Lester for [facility] but still not a candidate for inspire      The sleep apnea seen on the bipap may not be accurate, as the patient feels better and sleep studies did not show great control  However overall doing well    Would suggest COVID vaccination  She would not do well with getting covid        RTC in 4 week                          Thank you for allowing me to assist in the care of the MD Radha Escoto MD

## 2022-06-09 NOTE — PROGRESS NOTES
will not need oxygen at this time during the daytime. Seen by Dr. Francoise Shah for [facility] but still not a candidate for inspire      The sleep apnea seen on the bipap may not be accurate, as the patient feels better and sleep studies did not show great control  However overall doing well    Would suggest COVID vaccination  She would not do well with getting covid        RTC in 4 week               No follow-ups on file. I have personally reviewed and summarized the old records and/or obtained further history from someone other than the patient. I have independently reviewed the images and reviewed with patient    I have reviewed the lab tests, radiology reports and medications    I have downloaded and interpreted the cpap/bipap/pap data. I have made adjustments as described    Reviewed present meds and side effects. Continue present meds. Stay compliant. Call if worsens. Reviewed proper inhaler usage    Will ask for old records            [address] PULMONARY CRITICAL CARE AND SLEEP  [address] [place] [address]  Dept: [phone]  Loc: [phone]     Diagnosis:  [] LYNNETTE (ICD-10: G47.33)  o CSA (ICD-10: G47.31)  [] Complex Sleep Apnea (ICD-10: G47.37)  []  (ICD-10: G47.37)  [x] Hypoxemia (ICD-10: R09.02)  [] COPD (J44.90)  [] Chronic Respiratory Failure with hypoxemia (J96.11)  [] Chronicr Respiratory Failure with hypercapnia (J96.12)  [] Restrictive Lung Disease (J98.4)      [] New Rx (Device Preference: _________________________)     [] Change Order       [] Replace ___________  [] Clinical assessments and may include IN-Check device, spirometry and ETCO2 PRN    [] Discontinue Order -  [] CPAP    [] BPAP    [] PAP    [] Oxygen   /   AMA?  [] Yes   [] No              Therapy    AHI: ________ MICHELL: ________  LOW SpO2: ________%      [facility]    DEVICE / SETTINGS RAMP / COMFORT INTERFACE   []  CPAP () Pressure Ramp: _________ min's  [] Ramp to patient preference General PAP Supply Kit  Medicaid does not cover heated tubing  ([employer])  PAP Tubing:  [] Heated ()- 1/3 mos                         [] Regular ()  1/3 mos  [] Disposable Water Chamber () - 1/6 mos  [] Disposable Filter () - 2/mo  [] Non-Disposable Filter () - 1/6 mos   []  BiLevel PAP ()           IPAP        []  BiLevel PAP with   ()       Backup Rate ()      EPAP Rate  [] Adjust FLEX to patient comfort       SUPPLEMENTAL OXYGEN  [] OXYGEN:      Liter/min: _________  [] Continuous        [] Nocturnal  [] Bleed into PAP Device      []  EchoStar  Min Press                   Max Press   Mask Interface Kit    [] Mask interface () - 1/3 mos  [] Nasal Cushion ()  2/mo  [] Nasal Pillows ()  -2/mo  [] Headgear ()  1/6 mos   []  AutoBiLevel () Pressure  ()      Support           []  ResMed® IVAPS EPAP  [] Overnight Oximetry on Room Air  [] Overnight Oximetry on PAP Therapy  [x] Overnight Oximetry on __3_ LPM of oxygen  []  Overnight Oximetry on  PAP therapy with _____ lpm of O2    Target Pt Rate  Min PS      Target Va  Patient Ht  Ti Max                Ti Min        Rise time  Max PS  Trigger  Cycle  Epap  Epap max  Epap min  The patient has a history of:  [] Excessive Daytime Sleepiness  [] Insomnia  [] Impaired Cognition  [] Ischemic Heart Disease  [] Hypertension  [] Mood Disorders          [] History of Stroke  Additional Orders:______________________________________________________________________________________________________________    [] Titrate to comfort  [] Add cloud access via Airview or Care  Full Face Mask Kit    [] Full face mask ()  1/3 mos  [] Full Face Cushion ()  1/mo  [] Headgear ()  1/6 mos                                           [] Respironics® ASV Advanced ()     EPAP Min  PS Min      EPAP Max  PS Max   Additional Supplies    [] Neil Strap ()  [] Heated Humidifier Kit ()  Mask Specifications:   [] Patient Preference      -or-            Brand:______________ Size:_______________   Type: __________________________________   [] Mask Refit:___________________________  [] Mask Fitting:  [] Full     [] Nasal                []  Pillows                                Max Press  Ramp Time      Rate  Bi-flex: []1  []2  []3     [] Respironics® AVAPS: ()     IPAP Min  IPAP Max  Pressure Max  Epap max  Epap min  Rise time                      Avaps rate  EPAP   Additional Services    [] Annual PRN service and check of equipment  [] Routine service and check of equipment  [] Download and report compliance data   Tidal volume      Tigger                                     Rate                                         Inspiratory time                                                         The following equipment is Medically Necessary for the above stated patient. It is reasonable and necessary in reference to acceptable standards of medical practice for this condition, and is not prescribed as a convenience. Frequency of Use:    Daily                 Length of Need: 13 Months              o The patient requires BiLevel PAP and the following apply: []  The patient requires a Respiratory Assist Device (RAD) and the following apply:   o CPAP was tried and failed to meet therapeutic goals. [] CPAP was tried, but failed to meet therapeutic goals   o The prescribed mask interface has been properly fit, is the most comfortable to the patient and will be used with the BPAP device. [] The prescribed mask interface has been properly fit, is the most comfortable to the patient and will be used with the RAD.   o Current CPAP setting prevents patient from tolerating the therapy and lower CPAP settings fail to adequately control the symptoms of LYNNETTE, improve sleep quality, or reduce AHI to acceptable levels.  [] Current CPAP setting prevent patient from tolerating the therapy and lower PAP settings fail to  adequately control LYNNETTE symptoms, improve sleep quality, or reduce AHI to acceptable levels. [] There is significant improvement of the respiratory events on the RAD                                                                                                                                                                                                                                  Jadon Castañeda MD               NPI- [id number]     Viviane Jared [phone]                    06/09/22       ____________________________                        _______________________           Physician Signature                                                         Date                                                         Subjective   SUBJECTIVE/OBJECTIVE:  Using new bipap  And feeling well  However still having issues of keeping it on all night    Some Headache? always complains aobut this  No bloating  No burping  No chest pain    no ear popping    Going to bed 930 am and waking up 630    Breathing well  No chest pain    symbicort bid with spacer    Cough, no    Exercising more often, doing 2 laps    Some sob at [facility]    Was hospitalized in Feb 2020  And was on oxygen but now Using only with cpap    Overall dong well           Was seen by Dr. Liberty Diaz in regards to possible inspire placement  Seen 5/20/2021      ASSESSMENT:  1. LYNNETTE (obstructive sleep apnea)   2. BMI 35.0-35.9,adult   3. Intellectual disability     PLAN:  -We reviewed her history. She has severe LYNNETTE which seems to be refractory to CPAP therapy even when she is compliant. However, she overwhelmingly is noncompliant and has not been wearing her mask. Unfortunately, I feel that her options are fairly limited at this point. Other than positive pressure ventilation therapy, I feel that her next best option is likely weight loss which we discussed.  From a surgical standpoint, I do not feel that complex pharyngeal, laryngeal, or mandibular surgery would be likely to result in any meaningful benefit and would also come with significant pain. She is edentulous and therefore cannot try an oral appliance. 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 The only consideration from my point of view would be if she wanted to have a repeat DISE to verify her previous pattern of collapse, although this would be unlikely to change. Patient's caregiver plans to discuss with patient's sister who is her guardian, and expects that she will likely contact me to discuss further. In the office with melecio  Was hospitalized in 12/2020 with covid  Not really interested in getting covid vaccine    Asthma has been well controlled  Some exe dyspnea                Asthma  Her past medical history is significant for asthma. Review of Systems   Constitutional: Negative. HENT: Negative. Eyes: Negative. Respiratory: Negative. Cardiovascular: Negative. Gastrointestinal: Negative. Endocrine: Negative. Genitourinary: Negative. Musculoskeletal: Negative. Skin: Negative. Allergic/Immunologic: Negative. Neurological: Negative. Hematological: Negative. Psychiatric/Behavioral: Negative. Vitals:    06/09/22 1121   BP: 108/69   Site: Left Upper Arm   Position: Sitting   Cuff Size: Medium Adult   Pulse: 77   Resp: 16   Temp: 98.7 °F (37.1 °C)   TempSrc: Temporal   SpO2: 94%   Weight: 156 lb (70.8 kg)   Height: 5' 1\" (1.549 m)       Objective   Physical Exam  Vitals and nursing note reviewed. Constitutional:       General: She is not in acute distress. Appearance: Normal appearance. She is not ill-appearing. HENT:      Head: Normocephalic and atraumatic.       Right Ear: External ear normal.      Left Ear: External ear normal.      Nose: Nose normal.      Mouth/Throat:      Mouth: Mucous membranes are moist.      Pharynx: Oropharynx is clear. Comments: Mallampati 3  Eyes:      General: No scleral icterus. Extraocular Movements: Extraocular movements intact. Conjunctiva/sclera: Conjunctivae normal.      Pupils: Pupils are equal, round, and reactive to light. Cardiovascular:      Rate and Rhythm: Normal rate and regular rhythm. Pulses: Normal pulses. Heart sounds: Normal heart sounds. No murmur heard. No friction rub. Pulmonary:      Effort: Pulmonary effort is normal. No respiratory distress. Breath sounds: No stridor. Rales present. No wheezing or rhonchi. Chest:      Chest wall: No tenderness. Abdominal:      General: Abdomen is flat. Bowel sounds are normal. There is no distension. Tenderness: There is no abdominal tenderness. There is no guarding. Musculoskeletal:         General: No swelling or tenderness. Normal range of motion. Cervical back: Normal range of motion and neck supple. No rigidity. Right lower leg: Edema present. Left lower leg: Edema present. Skin:     General: Skin is warm and dry. Coloration: Skin is not jaundiced. Neurological:      General: No focal deficit present. Mental Status: She is alert and oriented to person, place, and time. Mental status is at baseline. Cranial Nerves: No cranial nerve deficit. Sensory: No sensory deficit. Motor: No weakness. Gait: Gait normal.   Psychiatric:         Mood and Affect: Mood normal.         Thought Content:  Thought content normal.         Judgment: Judgment normal.                  An electronic signature was used to authenticate this note.    --Angeline Martinez MD

## 2022-06-09 NOTE — PROGRESS NOTES
MA Communication:   The following orders are received by verbal communication from Marely Hagen MD    Orders include:  1 mo fu scheduled 7/18/22

## 2022-06-09 NOTE — PATIENT INSTRUCTIONS
ASSESSMENT/PLAN:  1. Obstructive sleep apnea  2. Moderate persistent asthma without complication  3. Shortness of breath  4. Class 1 obesity without serious comorbidity with body mass index (BMI) of 30.0 to 30.9 in adult, unspecified obesity type  5. COVID-19      Continue with:  symbicort 160/4.5 2puff twice a day, spacer  singulair (montelukast) once a day  Rescue albuterol (ventolin) as needed  Using spacer     Will stop the symbicort for 2 weeks  Call back in  2 weeks    May have issues with symbicort and the throat, had same problem with advair    Need to continue walk and exercise       Weight was 162 and then 157 and then up to 167 then at 145 and then at 161 and then at 162 and now at 156    Sleep study showed ahi of 55, this was redone 7/2016  Last study showed ahi of 33  bipap titration done 10/2017  Showed bipap of 11/7     dme is [employer]    Needs new straps and headgear    autobipap with ipap max of 20 and epap min of 13 and ps of 4  Using with nasal pillows  ti min of 0.3 and max 2.0  Trig and cycle medium  Using with oxygen at 2- 3 lpm    Using 30/30 of time  Using 4.3 h/night    90% pressure is 19.9/16  tv is 100  mv is 2.1    ahi is 60  Leak at 38 lpm    I don't have access via airview    Will stop bipap for now    Will do oxygen at 3 lpm at night  Will get noct pulse ox 3 lpm of oxygen          Was hospitalized 4/2017 with dx of Acute CHF  Would suggest taking Lasix (furosemide) in the day, like noon rather than at night     Was hospitalized at [facility] with Covid in December 2020  She has recovered  Last cxr was done 12/2020    cxr done 7/7/2021     Impression   Bilateral linear opacities likely representing areas of   scarring/postinflammatory fibrosis.           Would suggest increase the diuresis         Room air oxygen level was 94%, she will not need oxygen at this time during the daytime.       Seen by Dr. Conchis Chappell for [facility] but still not a candidate for inspire      The sleep apnea seen on the bipap may not be accurate, as the patient feels better and sleep studies did not show great control  However overall doing well    Would suggest COVID vaccination  She would not do well with getting covid        RTC in 4 week

## 2022-06-27 ENCOUNTER — TELEPHONE (OUTPATIENT)
Dept: PULMONOLOGY | Age: 62
End: 2022-06-27

## 2022-06-27 DIAGNOSIS — I26.99 PULMONARY EMBOLISM, OTHER, UNSPECIFIED CHRONICITY, UNSPECIFIED WHETHER ACUTE COR PULMONALE PRESENT (HCC): Primary | ICD-10-CM

## 2022-06-27 NOTE — TELEPHONE ENCOUNTER
Dustin Hopper pt Nurse at [facility],call stated Mr Sonam Lisa been SOB on the daytime after walking to bathroom,or around, her O2 drops to 88% to 89%,and goes back to above 90%, w/3L of oxygen at rest.  Dustin Hopper request for a order for oxygen to keep on all day. Please to call her at [phone].     Please advise

## 2022-06-28 NOTE — TELEPHONE ENCOUNTER
Lashanda Reddy - Nurse called back again today and would like Dr. Nicole Sarkar to call her  [phone]

## 2022-06-28 NOTE — TELEPHONE ENCOUNTER
[address] PULMONARY CRITICAL CARE AND SLEEP  [address] [facility]  [address] [address]  Dept: [phone]  Loc: [phone]     Diagnosis:  [] LYNNETTE (ICD-10: G47.33)  o CSA (ICD-10: G47.31)  [] Complex Sleep Apnea (ICD-10: G47.37)  []  (ICD-10: G47.37)  [x] Hypoxemia (ICD-10: R09.02)  [] COPD (J44.90)  [] Chronic Respiratory Failure with hypoxemia (J96.11)  [] Chronicr Respiratory Failure with hypercapnia (J96.12)  [] Restrictive Lung Disease (J98.4)      [] New Rx (Device Preference: _________________________)     [] Change Order       [] Replace ___________  [] Clinical assessments and may include IN-Check device, spirometry and ETCO2 PRN    [] Discontinue Order -  [] CPAP    [] BPAP    [] PAP    [] Oxygen   /   AMA?  [] Yes   [] No              Therapy    AHI: ________ MICHELL: ________  LOW SpO2: ________%      DME:    DEVICE / SETTINGS RAMP / COMFORT INTERFACE   []  CPAP () Pressure    Ramp: _________ min's  [] Ramp to patient preference General PAP Supply Kit  Medicaid does not cover heated tubing  ([employer])  PAP Tubing:  [] Heated ()- 1/3 mos                         [] Regular () - 1/3 mos  [] Disposable Water Chamber () - 1/6 mos  [] Disposable Filter () - 2/mo  [] Non-Disposable Filter () - 1/6 mos   []  BiLevel PAP ()           IPAP        []  BiLevel PAP with   ()       Backup Rate ()      EPAP Rate  [] Adjust FLEX to patient comfort       SUPPLEMENTAL OXYGEN  [x] OXYGEN:      Liter/min: _____3____  [x] Continuous        [] Nocturnal  [] Bleed into PAP Device      []  [address] Kit    [] Mask interface () - 1/3 mos  [] Nasal Cushion () - 2/mo  [] Nasal Pillows ()  -2/mo  [] Headgear () - 1/6 mos   []  AutoBiLevel () Pressure  ()      Support           []  ResMed® IVAPS EPAP  [] Overnight Oximetry on Room Air  [] Overnight Oximetry on PAP Therapy  [] Overnight Oximetry on ___ LPM of oxygen  []  Overnight Oximetry on  PAP therapy with _____ lpm of O2    Target Pt Rate  Min PS      Target Va  Patient Ht  Ti Max                Ti Min        Rise time  Max PS  Trigger  Cycle  Epap  Epap max  Epap min  The patient has a history of:  [] Excessive Daytime Sleepiness  [] Insomnia  [] Impaired Cognition  [] Ischemic Heart Disease  [] Hypertension  [] Mood Disorders          [] History of Stroke  Additional Orders:______________________________________________________________________________________________________________    [] Titrate to comfort  [] Add cloud access via mPort Full Face Mask Kit    [] Full face mask () - 1/3 mos  [] Full Face Cushion () - 1/mo  [] Headgear () - 1/6 mos                                           [] Respironics® ASV Advanced ()     EPAP Min  PS Min      EPAP Max  PS Max   Additional Supplies    [] Chin Strap ()  [] Heated Humidifier Kit ()  Mask Specifications:   [] Patient Preference      -or-            Brand:______________ Size:_______________   Type: __________________________________   [] Mask Refit:___________________________  [] Mask Fitting:  [] Full     [] Nasal                []  Pillows                                Max Press  Ramp Time      Rate  Bi-flex: []1  []2  []3     [] Respironics® AVAPS: ()     IPAP Min  IPAP Max  Pressure Max  Epap max  Epap min  Rise time                      Avaps rate  EPAP   Additional Services    [] Annual PRN service and check of equipment  [] Routine service and check of equipment  [] Download and report compliance data   Tidal volume      Tigger                                     Rate                                         Inspiratory time                                                         The following equipment is Medically Necessary for the above stated patient.   It is reasonable and necessary in reference to acceptable standards of medical practice for this condition, and is not prescribed as a convenience. Frequency of Use:    Daily                 Length of Need: 13 Months              o The patient requires BiLevel PAP and the following apply: []  The patient requires a Respiratory Assist Device (RAD) and the following apply:   o CPAP was tried and failed to meet therapeutic goals. [] CPAP was tried, but failed to meet therapeutic goals   o The prescribed mask interface has been properly fit, is the most comfortable to the patient and will be used with the BPAP device. [] The prescribed mask interface has been properly fit, is the most comfortable to the patient and will be used with the RAD.   o Current CPAP setting prevents patient from tolerating the therapy and lower CPAP settings fail to adequately control the symptoms of LYNNETTE, improve sleep quality, or reduce AHI to acceptable levels. [] Current CPAP setting prevent patient from tolerating the therapy and lower PAP settings fail to  adequately control LYNNETTE symptoms, improve sleep quality, or reduce AHI to acceptable levels.          [] There is significant improvement of the respiratory events on the RAD                                                                                                                                                                                                                                  Jeff John MD               NPI- [id number]     [facility] [id number]                    06/28/22       ____________________________                        _______________________           Physician Signature                                                         Date                                      I have no problem giving her the oxygen, however I would have her checked out with her primary care physician to make sure she is not volume overloaded

## 2022-06-29 ENCOUNTER — TELEPHONE (OUTPATIENT)
Dept: PULMONOLOGY | Age: 62
End: 2022-06-29

## 2022-06-29 NOTE — TELEPHONE ENCOUNTER
Gin call and asking  please call her today or Friday , she is going to have her phone with her, to not miss you call  [phone]

## 2022-06-30 ENCOUNTER — HOSPITAL ENCOUNTER (OUTPATIENT)
Dept: CT IMAGING | Age: 62
Discharge: HOME | End: 2022-06-30
Payer: MEDICARE

## 2022-06-30 DIAGNOSIS — I26.99 PULMONARY EMBOLISM, OTHER, UNSPECIFIED CHRONICITY, UNSPECIFIED WHETHER ACUTE COR PULMONALE PRESENT (HCC): ICD-10-CM

## 2022-06-30 PROCEDURE — 71260 CT THORAX DX C+: CPT

## 2022-06-30 PROCEDURE — 6360000004 HC RX CONTRAST MEDICATION: Performed by: INTERNAL MEDICINE

## 2022-06-30 RX ADMIN — IOPAMIDOL 75 ML: 755 INJECTION, SOLUTION INTRAVENOUS at 11:17

## 2022-06-30 NOTE — TELEPHONE ENCOUNTER
SW Dr. Jimbo Kidd. She said to order it STAT and give his cell number for results. I called and s/w Darcie De La Rosa and informed her. Scheduled testing at [facility] for today at 10 AM    Confirmed with Gin that patient could be there. Was also instructed not to leave until Dr. Jimbo Kidd was given the results.

## 2022-06-30 NOTE — TELEPHONE ENCOUNTER
Per Dr. Mine Alexandra, pt can go home. He told me to schedule her for next Friday, 7/8 at 8:45 am.    I s/w Gin and confirmed patient could be there. He wanted a 6MW done prior, however, no openings were available before that appt.

## 2022-06-30 NOTE — TELEPHONE ENCOUNTER
Have talked to Dominique Nicely, reviewed the case looked at the lab work from [facility] could not see the chest x-ray, however with this new onset of hypoxemia at various moments    We will get CTPA to rule out PE  Please call Dominique Nicely and set this up thank you

## 2022-07-08 ENCOUNTER — TELEPHONE (OUTPATIENT)
Dept: PULMONOLOGY | Age: 62
End: 2022-07-08

## 2022-07-08 ENCOUNTER — OFFICE VISIT (OUTPATIENT)
Dept: PULMONOLOGY | Age: 62
End: 2022-07-08
Payer: MEDICARE

## 2022-07-08 VITALS
DIASTOLIC BLOOD PRESSURE: 72 MMHG | HEART RATE: 77 BPM | TEMPERATURE: 99 F | HEIGHT: 61 IN | SYSTOLIC BLOOD PRESSURE: 139 MMHG | OXYGEN SATURATION: 95 % | BODY MASS INDEX: 29.07 KG/M2 | WEIGHT: 154 LBS | RESPIRATION RATE: 16 BRPM

## 2022-07-08 DIAGNOSIS — J45.40 MODERATE PERSISTENT ASTHMA WITHOUT COMPLICATION: ICD-10-CM

## 2022-07-08 DIAGNOSIS — J44.9 CHRONIC OBSTRUCTIVE PULMONARY DISEASE, UNSPECIFIED COPD TYPE (HCC): ICD-10-CM

## 2022-07-08 DIAGNOSIS — R06.02 SHORTNESS OF BREATH: ICD-10-CM

## 2022-07-08 DIAGNOSIS — E66.9 CLASS 1 OBESITY WITHOUT SERIOUS COMORBIDITY WITH BODY MASS INDEX (BMI) OF 30.0 TO 30.9 IN ADULT, UNSPECIFIED OBESITY TYPE: ICD-10-CM

## 2022-07-08 DIAGNOSIS — R05.9 COUGH: ICD-10-CM

## 2022-07-08 DIAGNOSIS — J96.11 CHRONIC RESPIRATORY FAILURE WITH HYPOXIA (HCC): Primary | ICD-10-CM

## 2022-07-08 DIAGNOSIS — G47.33 OBSTRUCTIVE SLEEP APNEA: ICD-10-CM

## 2022-07-08 PROCEDURE — G8427 DOCREV CUR MEDS BY ELIG CLIN: HCPCS | Performed by: INTERNAL MEDICINE

## 2022-07-08 PROCEDURE — 1036F TOBACCO NON-USER: CPT | Performed by: INTERNAL MEDICINE

## 2022-07-08 PROCEDURE — G8417 CALC BMI ABV UP PARAM F/U: HCPCS | Performed by: INTERNAL MEDICINE

## 2022-07-08 PROCEDURE — 3017F COLORECTAL CA SCREEN DOC REV: CPT | Performed by: INTERNAL MEDICINE

## 2022-07-08 PROCEDURE — 99215 OFFICE O/P EST HI 40 MIN: CPT | Performed by: INTERNAL MEDICINE

## 2022-07-08 ASSESSMENT — ENCOUNTER SYMPTOMS
GASTROINTESTINAL NEGATIVE: 1
ALLERGIC/IMMUNOLOGIC NEGATIVE: 1
EYES NEGATIVE: 1
RESPIRATORY NEGATIVE: 1

## 2022-07-08 NOTE — PROGRESS NOTES
Blu Zarate (:  1960) is a 58 y.o. female,Established patient, here for evaluation of the following chief complaint(s):  Shortness of Breath (Hypoxia)         ASSESSMENT/PLAN:  1. Chronic respiratory failure with hypoxia (HCC)  2. Moderate persistent asthma without complication  -     NE PORTABLE OXYGEN CONCENTRATOR  3. Obstructive sleep apnea  4. Class 1 obesity without serious comorbidity with body mass index (BMI) of 30.0 to 30.9 in adult, unspecified obesity type  5. Cough  6. Shortness of breath  7.  Chronic obstructive pulmonary disease, unspecified COPD type ([address]      Continue with:  symbicort 160/4.5 2puff twice a day, spacer  singulair (montelukast) once a day  Rescue albuterol (ventolin) as needed  Using spacer       May have issues with symbicort and the throat, had same problem with advair    Need to continue walk and exercise     At next  appt  Will consider dropping the symbicort to 2 puffs once day      Weight was 162 and then 157 and then up to 167 then at 145 and then at 161 and then at 162 and then at 156 to 154    Sleep study showed ahi of 55, this was redone 2016  Last study showed ahi of 33  bipap titration done 10/2017  Showed bipap of /7     dme is [employer]    autobipap with ipap max of 20 and epap min of 13 and ps of 4  I don't have access via airview     Seen by Dr. Lidia Mckeon for [place] but still not a candidate for inspire      Stopped using auto BiPAP because the patient would keep pulling off the machine at night and not using it for through the night  It was becoming a struggle to try to keep her compliant    Changed her to oxygen only at night  Continue with oxygen 3 L/min at night          Was hospitalized 2017 with dx of Acute CHF  Would suggest taking Lasix (furosemide) in the day, like noon rather than at night     Was hospitalized at [facility] with [address] in 2020  She has recovered  Last cxr was done 2020    cxr done 2021     Impression Bilateral linear opacities likely representing areas of   scarring/postinflammatory fibrosis.                 Hypoxemia and shortness of breath starting around 6/27/2022  CTPA done 6/30/2022  Impression   No pulmonary embolus is identified.       Parenchymal pattern suggestive of air trapping and atelectasis. Pneumonia/pneumonitis is considered less likely.       Moderate cardiomegaly. Last BNP done on 6/17/2022 was 300  Last eosinophil done on 6/17/2022 was 223    Last echo done 6/22/2022  Study Conclusions     - Left ventricle: The cavity size is normal. Wall thickness is normal. Systolic function was mildly     to moderately reduced. The estimated ejection fraction was in the range of 40% to 45%. Diffuse     hypokinesis. - Mitral valve: Mild regurgitation.   - Right ventricle: Systolic function was normal by objective interpretation. TAPSE: 2.2cm. 6-minute walk test done in the office  Patient was only able to get out about 30 seconds and started dropping O2 saturations on room air down to 76%  Stopped the walking  Will need oxygen 2 to 3 L/min with a portable oxygen concentrator    Most likely there may be a complex disorder going on at this time, a problem with her low ejection fraction along with the inability to take deep breaths which may be contributing to a hypoxic state when walking. Will get noct pulse ox on 3 lpm at night      RTC in 6 week Deepak Alcocer NP              Return in about 6 weeks (around 8/19/2022). I have personally reviewed and summarized the old records and/or obtained further history from someone other than the patient. I have independently reviewed the images and reviewed with patient    I have reviewed the lab tests, radiology reports and medications    I have downloaded and interpreted the cpap/bipap/pap data. I have made adjustments as described    Reviewed present meds and side effects. Continue present meds. Stay compliant. Call if worsens. Reviewed proper inhaler usage      Spent 5 52 minutes with the patient and going over her chart and getting 6-minute walk test and discussing with sister and guardian         Arvind Ye  [address] [address]  Dept: [phone]  Loc: [phone]     Diagnosis:  [] LYNNETTE (ICD-10: G47.33)  o CSA (ICD-10: G47.31)  [] Complex Sleep Apnea (ICD-10: G47.37)  []  (ICD-10: G47.37)  [] Hypoxemia (ICD-10: R09.02)  [x] COPD (J44.90)  [x] Chronic Respiratory Failure with hypoxemia (J96.11)  [] Chronicr Respiratory Failure with hypercapnia (J96.12)  [] Restrictive Lung Disease (J98.4)      [] New Rx (Device Preference: _________________________)     [] Change Order       [] Replace ___________  [] Clinical assessments and may include IN-Check device, spirometry and ETCO2 PRN    [] Discontinue Order -  [] CPAP    [] BPAP    [] PAP    [] Oxygen   /   AMA?  [] Yes   [] No              Therapy    AHI: ________ MICHELL: ________  LOW SpO2: ________%      [facility]    DEVICE / SETTINGS RAMP / COMFORT INTERFACE   []  CPAP () Pressure    Ramp: _________ min's  [] Ramp to patient preference General PAP Supply Kit  Medicaid does not cover heated tubing  ([employer])  PAP Tubing:  [] Heated ()- 1/3 mos                         [] Regular () - 1/3 mos  [] Disposable Water Chamber () - 1/6 mos  [] Disposable Filter () - 2/mo  [] Non-Disposable Filter () - 1/6 mos   []  BiLevel PAP ()           IPAP        []  BiLevel PAP with   ()       Backup Rate ()      EPAP Rate  [] Adjust FLEX to patient comfort       SUPPLEMENTAL OXYGEN  [] OXYGEN:      Liter/min: _________  [] Continuous        [] Nocturnal  [] Bleed into PAP Device      []  [facility] Kit    [] Mask interface () - 1/3 mos  [] Nasal Cushion () - 2/mo  [] Nasal Pillows ()  -2/mo  [] Headgear () - 1/6 mos   []  AutoBiLevel () Pressure  ()      Support           []  ResMed® IVAPS EPAP  [] Overnight Oximetry on Room Air  [] Overnight Oximetry on PAP Therapy  [x] Overnight Oximetry on _3__ LPM of oxygen  []  Overnight Oximetry on  PAP therapy with _____ lpm of O2    Target Pt Rate  Min PS      Target Va  Patient Ht  Ti Max                Ti Min        Rise time  Max PS  Trigger  Cycle  Epap  Epap max  Epap min  The patient has a history of:  [] Excessive Daytime Sleepiness  [] Insomnia  [] Impaired Cognition  [] Ischemic Heart Disease  [] Hypertension  [] Mood Disorders          [] History of Stroke  Additional Orders:______________________________________________________________________________________________________________    [] Titrate to comfort  [] Add cloud access via [employer] [facility] [facility] or [employer] Full Face Mask Kit    [] Full face mask () - 1/3 mos  [] Full Face Cushion () - 1/mo  [] Headgear () - 1/6 mos                                           [] Respironics® ASV Advanced ()     EPAP Min  PS Min      EPAP Max  PS Max   Additional Supplies    [] Chin Strap ()  [] Heated Humidifier Kit ()  Mask Specifications:   [] Patient Preference      -or-            Brand:______________ Size:_______________   Type: __________________________________   [] Mask Refit:___________________________  [] Mask Fitting:  [] Full     [] Nasal                []  Pillows                                Max Press  Ramp Time      Rate  Bi-flex: []1  []2  []3     [] Respironics® AVAPS: ()     IPAP Min  IPAP Max  Pressure Max  Epap max  Epap min  Rise time                      Avaps rate  EPAP   Additional Services    [] Annual PRN service and check of equipment  [] Routine service and check of equipment  [] Download and report compliance data   Tidal volume      Tigger                                     Rate Inspiratory time                                                         The following equipment is Medically Necessary for the above stated patient. It is reasonable and necessary in reference to acceptable standards of medical practice for this condition, and is not prescribed as a convenience. Frequency of Use:    Daily                 Length of Need: 13 Months              o The patient requires BiLevel PAP and the following apply: []  The patient requires a Respiratory Assist Device (RAD) and the following apply:   o CPAP was tried and failed to meet therapeutic goals. [] CPAP was tried, but failed to meet therapeutic goals   o The prescribed mask interface has been properly fit, is the most comfortable to the patient and will be used with the BPAP device. [] The prescribed mask interface has been properly fit, is the most comfortable to the patient and will be used with the RAD.   o Current CPAP setting prevents patient from tolerating the therapy and lower CPAP settings fail to adequately control the symptoms of LYNNETTE, improve sleep quality, or reduce AHI to acceptable levels. [] Current CPAP setting prevent patient from tolerating the therapy and lower PAP settings fail to  adequately control LYNNETTE symptoms, improve sleep quality, or reduce AHI to acceptable levels.          [] There is significant improvement of the respiratory events on the RAD                                                                                                                                                                                                                                  John Hamilton MD NPI- [id number]     [place]- [id number]                    07/12/22       ____________________________                        _______________________           Physician Signature                                                         Date                                                         Subjective SUBJECTIVE/OBJECTIVE:  Using new bipap  And feeling well  However still having issues of keeping it on all night    Some Headache? always complains aobut this  No bloating  No burping  No chest pain    no ear popping    Going to bed 930 am and waking up 630    Breathing well  No chest pain    symbicort bid with spacer    Cough, no    Exercising more often, doing 2 laps    [facility] sob at [facility]    Was hospitalized in Feb 2020  And was on oxygen but now Using only with cpap    Overall dong well           Was seen by Dr. Jacky Hernandez in regards to possible inspire placement  Seen 5/20/2021      ASSESSMENT:  1. LYNNETTE (obstructive sleep apnea)   2. BMI 35.0-35.9,adult   3. Intellectual disability     PLAN:  -We reviewed her history. She has severe LYNNETTE which seems to be refractory to CPAP therapy even when she is compliant. However, she overwhelmingly is noncompliant and has not been wearing her mask. Unfortunately, I feel that her options are fairly limited at this point. Other than positive pressure ventilation therapy, I feel that her next best option is likely weight loss which we discussed. From a surgical standpoint, I do not feel that complex pharyngeal, laryngeal, or mandibular surgery would be likely to result in any meaningful benefit and would also come with significant pain. She is edentulous and therefore cannot try an oral appliance. 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 The only consideration from my point of view would be if she wanted to have a repeat DISE to verify her previous pattern of collapse, although this would be unlikely to change. Patient's caregiver plans to discuss with patient's sister who is her guardian, and expects that she will likely contact me to discuss further.          In the office with melecio  Was hospitalized in 12/2020 with covid  Not really interested in getting covid vaccine    Asthma has been well controlled  Some exe dyspnea            Since last office visit  Over the last week the patient has increased oxygen requirements  I had sent for a CTPA which was negative for PE  Having more issues with hypoxemia with walking  No chest pains  Asymptomatic  Does get short of breath with walking  Trying to have oxygen available all the time however right now ordered at night only  No chest pains or palpitations      When off the symbicort no further issues. Asthma  Her past medical history is significant for asthma. Review of Systems   Constitutional: Negative. HENT: Negative. Eyes: Negative. Respiratory: Negative. Cardiovascular: Negative. Gastrointestinal: Negative. Endocrine: Negative. Genitourinary: Negative. Musculoskeletal: Negative. Skin: Negative. Allergic/Immunologic: Negative. Neurological: Negative. Hematological: Negative. Psychiatric/Behavioral: Negative. Vitals:    07/08/22 0850   BP: 139/72   Site: Left Lower Arm   Position: Sitting   Cuff Size: Medium Adult   Pulse: 77   Resp: 16   Temp: 99 °F (37.2 °C)   TempSrc: Temporal   SpO2: 95%   Weight: 154 lb (69.9 kg)   Height: 5' 1\" (1.549 m)       Objective   Physical Exam  Vitals and nursing note reviewed. Constitutional:       General: She is not in acute distress. Appearance: Normal appearance. She is not ill-appearing. HENT:      Head: Normocephalic and atraumatic. Right Ear: External ear normal.      Left Ear: External ear normal.      Nose: Nose normal.      Mouth/Throat:      Mouth: Mucous membranes are moist.      Pharynx: Oropharynx is clear. Comments: Mallampati 3  Eyes:      General: No scleral icterus. Extraocular Movements: Extraocular movements intact. Conjunctiva/sclera: Conjunctivae normal.      Pupils: Pupils are equal, round, and reactive to light. Cardiovascular:      Rate and Rhythm: Normal rate and regular rhythm.

## 2022-07-08 NOTE — PATIENT INSTRUCTIONS
ASSESSMENT/PLAN:  1. Acute respiratory failure with hypoxia ([address]  2. Moderate persistent asthma without complication  3. Obstructive sleep apnea  4. Class 1 obesity without serious comorbidity with body mass index (BMI) of 30.0 to 30.9 in adult, unspecified obesity type  5. Cough  6.  Shortness of breath      Continue with:  symbicort 160/4.5 2puff twice a day, spacer  singulair (montelukast) once a day  Rescue albuterol (ventolin) as needed  Using spacer       May have issues with symbicort and the throat, had same problem with advair    Need to continue walk and exercise     At next  appt  Will consider dropping the symbicort to 2 puffs once day      Weight was 162 and then 157 and then up to 167 then at 145 and then at 161 and then at 162 and then at 156 to 154    Sleep study showed ahi of 55, this was redone 7/2016  Last study showed ahi of 33  bipap titration done 10/2017  Showed bipap of 11/7     dme is [employer]    autobipap with ipap max of 20 and epap min of 13 and ps of 4  I don't have access via airview     Seen by Dr. Parker Hoang for [facility] but still not a candidate for inspire      Stopped using auto BiPAP because the patient would keep pulling off the machine at night and not using it for through the night  It was becoming a struggle to try to keep her compliant    Changed her to oxygen only at night  Continue with oxygen 3 L/min at night          Was hospitalized 4/2017 with dx of Acute CHF  Would suggest taking Lasix (furosemide) in the day, like noon rather than at night     Was hospitalized at [facility] with [address] in December 2020  She has recovered  Last cxr was done 12/2020    cxr done 7/7/2021     Impression   Bilateral linear opacities likely representing areas of   scarring/postinflammatory fibrosis.                 Hypoxemia and shortness of breath starting around 6/27/2022  CTPA done 6/30/2022  Impression   No pulmonary embolus is identified.       Parenchymal pattern suggestive of air trapping and atelectasis. Pneumonia/pneumonitis is considered less likely.       Moderate cardiomegaly. Last BNP done on 6/17/2022 was 300  Last eosinophil done on 6/17/2022 was 223    Last echo done 6/22/2022  Study Conclusions     - Left ventricle: The cavity size is normal. Wall thickness is normal. Systolic function was mildly     to moderately reduced. The estimated ejection fraction was in the range of 40% to 45%. Diffuse     hypokinesis. - Mitral valve: Mild regurgitation.   - Right ventricle: Systolic function was normal by objective interpretation. TAPSE: 2.2cm. 6-minute walk test done in the office  Patient was only able to get out about 30 seconds and started dropping O2 saturations on room air down to 76%  Stopped the walking  Will need oxygen 2 to 3 L/min with a portable oxygen concentrator    Most likely there may be a complex disorder going on at this time, a problem with her low ejection fraction along with the inability to take deep breaths which may be contributing to a hypoxic state when walking. Will get noct pulse ox on 3 lpm at night      RTC in 6 week Piotr Joya NP      Remember to bring a list of pulmonary medications and any CPAP or BiPAP machines to your next appointment with the office. Please keep all of your future appointments scheduled by [facility] office. Out of respect for other patients and providers, you may be asked to reschedule your appointment if you arrive later than your scheduled appointment time. Appointments cancelled less than 24hrs in advance will be considered a no show. Patients with three missed appointments within 1 year or four missed appointments within 2 years can be dismissed from the practice.      Please be aware that our physicians are required to work in the Intensive Care Unit at [facility].  Your appointment may need to be rescheduled if they are designated to work during your appointment time.      You may receive a survey regarding the care you received during your visit. Your input is valuable to us. We encourage you to complete and return your survey. We hope you will choose us in the future for your healthcare needs. Pt instructed of all future appointment dates & times, including radiology, labs, procedures & referrals. If procedures were scheduled preparation instructions provided. Instructions on future appointments with [facility] were given.

## 2022-07-08 NOTE — PROGRESS NOTES
MA Communication:   The following orders are received by verbal communication from Abdiel Russell MD    Orders include:  Orders sent to [address]       6 wk shazia weeks/Mariel scheduled 8/15/22

## 2022-07-20 RX ORDER — BUDESONIDE AND FORMOTEROL FUMARATE DIHYDRATE 160; 4.5 UG/1; UG/1
2 AEROSOL RESPIRATORY (INHALATION) 2 TIMES DAILY
Qty: 10.2 G | Refills: 2 | Status: SHIPPED | OUTPATIENT
Start: 2022-07-20 | End: 2022-08-16

## 2022-08-09 ENCOUNTER — TELEPHONE (OUTPATIENT)
Dept: PULMONOLOGY | Age: 62
End: 2022-08-09

## 2022-08-09 NOTE — TELEPHONE ENCOUNTER
Jess is still having significant hypoxia on the 3 L nc at night. Will review with Dr. Yeison Skaggs next steps.

## 2022-08-10 DIAGNOSIS — J96.11 CHRONIC RESPIRATORY FAILURE WITH HYPOXIA (HCC): Primary | ICD-10-CM

## 2022-08-10 DIAGNOSIS — U07.1 ACUTE RESPIRATORY FAILURE DUE TO COVID-19 (HCC): ICD-10-CM

## 2022-08-10 DIAGNOSIS — J96.00 ACUTE RESPIRATORY FAILURE DUE TO COVID-19 (HCC): ICD-10-CM

## 2022-08-10 NOTE — TELEPHONE ENCOUNTER
[address] called and said they cannot use diagnosis Acute Respiratory Failure or Hypoxia. Insurance won't accept that. They can use \"Chronic Respiratory Failure with Hypoxemia\" (J96.11) or COPD. Please place new order with new diagnosis. Thank you! Statement Selected

## 2022-08-10 NOTE — PROGRESS NOTES
Call Gio Hidalgo to verified fax number [phone] ,that she provide me to send orders,since  trying twice with no  answer,Gin will call back to see why is not working or provide another fax number.

## 2022-08-12 PROBLEM — E66.811 OBESITY (BMI 30.0-34.9): Status: ACTIVE | Noted: 2017-04-19

## 2022-08-12 PROBLEM — I51.9 SYSTOLIC DYSFUNCTION, LEFT VENTRICLE: Status: ACTIVE | Noted: 2017-04-21

## 2022-08-12 PROBLEM — N18.30 CKD (CHRONIC KIDNEY DISEASE) STAGE 3, GFR 30-59 ML/MIN (HCC): Status: ACTIVE | Noted: 2018-02-21

## 2022-08-12 PROBLEM — E66.9 OBESITY (BMI 30.0-34.9): Status: ACTIVE | Noted: 2017-04-19

## 2022-08-12 PROBLEM — I51.89 SYSTOLIC DYSFUNCTION, LEFT VENTRICLE: Status: ACTIVE | Noted: 2017-04-21

## 2022-08-12 PROBLEM — E03.9 HYPOTHYROIDISM: Status: ACTIVE | Noted: 2022-08-12

## 2022-08-12 PROBLEM — F71 MODERATE INTELLECTUAL DISABILITY: Status: ACTIVE | Noted: 2020-02-22

## 2022-08-14 ENCOUNTER — APPOINTMENT (OUTPATIENT)
Dept: GENERAL RADIOLOGY | Age: 62
End: 2022-08-14
Payer: MEDICARE

## 2022-08-14 ENCOUNTER — HOSPITAL ENCOUNTER (EMERGENCY)
Age: 62
Discharge: HOME | End: 2022-08-14
Attending: STUDENT IN AN ORGANIZED HEALTH CARE EDUCATION/TRAINING PROGRAM
Payer: MEDICARE

## 2022-08-14 VITALS
OXYGEN SATURATION: 93 % | TEMPERATURE: 98.7 F | HEIGHT: 61 IN | SYSTOLIC BLOOD PRESSURE: 137 MMHG | BODY MASS INDEX: 30.21 KG/M2 | RESPIRATION RATE: 16 BRPM | WEIGHT: 160 LBS | HEART RATE: 85 BPM | DIASTOLIC BLOOD PRESSURE: 82 MMHG

## 2022-08-14 DIAGNOSIS — R06.02 SHORTNESS OF BREATH: Primary | ICD-10-CM

## 2022-08-14 DIAGNOSIS — G47.33 OBSTRUCTIVE SLEEP APNEA: ICD-10-CM

## 2022-08-14 DIAGNOSIS — R06.00 DYSPNEA, UNSPECIFIED TYPE: ICD-10-CM

## 2022-08-14 LAB
A/G RATIO: 1.1 (ref 1.1–2.2)
ALBUMIN SERPL-MCNC: 3.6 G/DL (ref 3.4–5)
ALP BLD-CCNC: 170 U/L (ref 40–129)
ALT SERPL-CCNC: 13 U/L (ref 10–40)
ANION GAP SERPL CALCULATED.3IONS-SCNC: 11 MMOL/L (ref 3–16)
AST SERPL-CCNC: 13 U/L (ref 15–37)
BASOPHILS ABSOLUTE: 0 K/UL (ref 0–0.2)
BASOPHILS RELATIVE PERCENT: 0.4 %
BILIRUB SERPL-MCNC: <0.2 MG/DL (ref 0–1)
BUN BLDV-MCNC: 14 MG/DL (ref 7–20)
CALCIUM SERPL-MCNC: 9.3 MG/DL (ref 8.3–10.6)
CHLORIDE BLD-SCNC: 96 MMOL/L (ref 99–110)
CO2: 30 MMOL/L (ref 21–32)
CREAT SERPL-MCNC: 0.8 MG/DL (ref 0.6–1.2)
EOSINOPHILS ABSOLUTE: 0.1 K/UL (ref 0–0.6)
EOSINOPHILS RELATIVE PERCENT: 1.7 %
GFR AFRICAN AMERICAN: >60
GFR NON-AFRICAN AMERICAN: >60
GLUCOSE BLD-MCNC: 148 MG/DL (ref 70–99)
HCT VFR BLD CALC: 36.2 % (ref 36–48)
HEMOGLOBIN: 11.9 G/DL (ref 12–16)
LYMPHOCYTES ABSOLUTE: 1.5 K/UL (ref 1–5.1)
LYMPHOCYTES RELATIVE PERCENT: 18.6 %
MCH RBC QN AUTO: 30.6 PG (ref 26–34)
MCHC RBC AUTO-ENTMCNC: 32.8 G/DL (ref 31–36)
MCV RBC AUTO: 93.4 FL (ref 80–100)
MONOCYTES ABSOLUTE: 0.4 K/UL (ref 0–1.3)
MONOCYTES RELATIVE PERCENT: 5.2 %
NEUTROPHILS ABSOLUTE: 5.8 K/UL (ref 1.7–7.7)
NEUTROPHILS RELATIVE PERCENT: 74.1 %
PDW BLD-RTO: 14.6 % (ref 12.4–15.4)
PLATELET # BLD: 261 K/UL (ref 135–450)
PMV BLD AUTO: 7.7 FL (ref 5–10.5)
POTASSIUM SERPL-SCNC: 4.1 MMOL/L (ref 3.5–5.1)
PRO-BNP: 804 PG/ML (ref 0–124)
RBC # BLD: 3.88 M/UL (ref 4–5.2)
SARS-COV-2, NAAT: NOT DETECTED
SODIUM BLD-SCNC: 137 MMOL/L (ref 136–145)
TOTAL PROTEIN: 7 G/DL (ref 6.4–8.2)
TROPONIN: <0.01 NG/ML
TSH REFLEX: 1.74 UIU/ML (ref 0.27–4.2)
WBC # BLD: 7.9 K/UL (ref 4–11)

## 2022-08-14 PROCEDURE — 36415 COLL VENOUS BLD VENIPUNCTURE: CPT

## 2022-08-14 PROCEDURE — 85025 COMPLETE CBC W/AUTO DIFF WBC: CPT

## 2022-08-14 PROCEDURE — 84484 ASSAY OF TROPONIN QUANT: CPT

## 2022-08-14 PROCEDURE — 93005 ELECTROCARDIOGRAM TRACING: CPT | Performed by: STUDENT IN AN ORGANIZED HEALTH CARE EDUCATION/TRAINING PROGRAM

## 2022-08-14 PROCEDURE — 87635 SARS-COV-2 COVID-19 AMP PRB: CPT

## 2022-08-14 PROCEDURE — 83880 ASSAY OF NATRIURETIC PEPTIDE: CPT

## 2022-08-14 PROCEDURE — 71045 X-RAY EXAM CHEST 1 VIEW: CPT

## 2022-08-14 PROCEDURE — 99285 EMERGENCY DEPT VISIT HI MDM: CPT

## 2022-08-14 PROCEDURE — 84443 ASSAY THYROID STIM HORMONE: CPT

## 2022-08-14 PROCEDURE — 80053 COMPREHEN METABOLIC PANEL: CPT

## 2022-08-14 ASSESSMENT — ENCOUNTER SYMPTOMS
COLOR CHANGE: 0
SHORTNESS OF BREATH: 0
SORE THROAT: 0
BACK PAIN: 0
VOMITING: 0
COUGH: 0
CONSTIPATION: 0
DIARRHEA: 0
SINUS PRESSURE: 0
NAUSEA: 0
ABDOMINAL PAIN: 0

## 2022-08-14 ASSESSMENT — PAIN - FUNCTIONAL ASSESSMENT: PAIN_FUNCTIONAL_ASSESSMENT: NONE - DENIES PAIN

## 2022-08-14 NOTE — DISCHARGE INSTRUCTIONS
Jess was seen in the emergency department with concern for low oxygen levels. Fortunately she did not require oxygen here in the emergency department. She has no underlying signs of pneumonia. It does not look like she is in acute heart failure. We are reassured that she has close follow-up with her pulmonologist tomorrow. Make sure that when she does not nap or sleep that she has her baseline oxygen on her. Return to the emergency department for any new or worsening symptoms.

## 2022-08-14 NOTE — ED PROVIDER NOTES
ATTENDING PHYSICIAN NOTE       Date of evaluation: 8/14/2022    Chief Complaint     Shortness of Breath (Pt MRDD. Pt from group home. EMS states call was for hypoxia in the 70s. EMS states that on their arrival, oxygen was 100%. States that in route pt's oxygen went down to low 90s. 95% on RA in triage.)      History of Present Illness     Jess Wharton is a 58 y.o. female who presents with concern for shortness of breath. History is obtained via sister. Reports that she has had ongoing issues with obstructive sleep apnea during the evening. She has had episodes where she gets hypoxic to the 80s. Reports that she was active yesterday and was feeling tired throughout today. She was sleeping at her group home when they noted her oxygen level dropped to the 70s and called EMS. Patient usually only requires oxygen in the evening. States that pulmonology recently increased her nightly O2 to 5 L. Of note she was also seen by nursing care yesterday and was expressing chest pain however she denies this currently. Patient currently denies any chest pain, shortness of breath or cough. Deny recent fever, nausea, vomiting, abdominal pain, weight gain, lower extremity edema. Denies any new sick contacts. She did not receive anything for symptoms. Sister reports that she has close follow-up with pulmonology tomorrow morning. Review of Systems     Review of Systems   Constitutional:  Negative for appetite change, chills, fatigue and fever. HENT:  Negative for congestion, sinus pressure and sore throat. Eyes:  Negative for visual disturbance. Respiratory:  Negative for cough and shortness of breath. Cardiovascular:  Negative for chest pain and palpitations. Gastrointestinal:  Negative for abdominal pain, constipation, diarrhea, nausea and vomiting. Endocrine: Negative for polydipsia and polyuria. Genitourinary:  Negative for dysuria, flank pain and hematuria.    Musculoskeletal:  Negative for arthralgias, back pain, myalgias and neck pain. Skin:  Negative for color change. Neurological:  Negative for dizziness, syncope, weakness, light-headedness and headaches. Psychiatric/Behavioral:  Negative for confusion. Past Medical, Surgical, Family, and Social History     She has a past medical history of Asthma, Blind left eye, Cardiomyopathy ([address] CHF (congestive heart failure) ([facility] [address] COVID-19, Depression, Diabetes, Dizziness and giddiness, GALLAGHER (dyspnea on exertion), Fractures, Hyperthyroidism, Hypoxemia, Mental retardation, Mitral valve disorder, Mixed hyperlipidemia, Profound impairment, one eye, impairment level not further specified, Sleep apnea, Toxic uninodular goiter, and Vitamin D deficiency. She has a past surgical history that includes Hysterectomy; Cholecystectomy; Diagnostic Cardiac Cath Lab Procedure; and Colonoscopy. Her family history includes Breast Cancer in her sister; Cervical Cancer in her sister; [place] in her father; Ovarian Cancer in her mother. She reports that she has never smoked. She has never used smokeless tobacco. She reports that she does not drink alcohol and does not use drugs. Medications     Previous Medications    ADVAIR DISKUS 250-50 MCG/DOSE AEPB    INHALE 1 PUFF INTO THE LUNGS 2 TIMES DAILY    ALBUTEROL SULFATE HFA (PROVENTIL;VENTOLIN;PROAIR) 108 (90 BASE) MCG/ACT INHALER    USE 2 PUFFS EVERY 6 (SIX) HOURS AS NEEDED.     ARIPIPRAZOLE (ABILIFY) 10 MG TABLET    Take 10 mg by mouth Daily with lunch     ASPIRIN 81 MG CHEWABLE TABLET    Take 1 tablet by mouth daily    ATORVASTATIN (LIPITOR) 10 MG TABLET    Take 10 mg by mouth nightly     BUDESONIDE-FORMOTEROL (SYMBICORT) 160-4.5 MCG/ACT AERO    INHALE 2 PUFFS INTO THE LUNGS 2 TIMES DAILY    BUPROPION (WELLBUTRIN SR) 200 MG EXTENDED RELEASE TABLET    Take 200 mg by mouth daily    CLOMIPRAMINE (ANAFRANIL) 75 MG CAPSULE    Take 150 mg by mouth nightly     ESTRADIOL (CLIMARA) 0.025 MG/24HR        ESTRADIOL 0.025 MG/24HR PTTW    Place 1 patch onto the skin once a week     FLUTICASONE (FLONASE) 50 MCG/ACT NASAL SPRAY    fluticasone propionate 50 mcg/actuation nasal spray,suspension    FUROSEMIDE (LASIX) 40 MG TABLET    Take 40 mg by mouth 2 times daily    HYDROCORTISONE 1 % CREAM    Apply topically 2 times daily as needed (apply topically for athlete's foot)    ISOSORBIDE MONONITRATE (IMDUR) 30 MG EXTENDED RELEASE TABLET    Take 30 mg by mouth daily     LEVETIRACETAM (KEPPRA) 500 MG TABLET    Take 1 tablet by mouth 2 times daily    LORATADINE (CLARITIN) 10 MG CAPSULE    Take 10 mg by mouth daily    LOSARTAN (COZAAR) 25 MG TABLET    Take 50 mg by mouth daily    MAGNESIUM OXIDE (MAG-OX) 400 MG TABLET    Take 400 mg by mouth daily    METOCLOPRAMIDE (REGLAN) 5 MG TABLET    Take 5 mg by mouth 4 times daily (before meals and nightly)     METOPROLOL SUCCINATE (TOPROL XL) 25 MG EXTENDED RELEASE TABLET    Take 25 mg by mouth daily    MONTELUKAST (SINGULAIR) 10 MG TABLET    TAKE 1 TABLET BY MOUTH AT BEDTIME. MULTIPLE VITAMIN (MULTI-DAY PO)    Take by mouth    NEXIUM 40 MG DELAYED RELEASE CAPSULE    Take 40 mg by mouth every morning (before breakfast)     VITAMIN D PO    Take 2 tablets by mouth daily       Allergies     She is allergic to penicillins. Physical Exam     INITIAL VITALS: BP: (!) 142/72, Temp: 98.7 °F (37.1 °C), Heart Rate: 82, Resp: 16, SpO2: 96 %   Physical Exam  Vitals and nursing note reviewed. Constitutional:       General: She is not in acute distress. Appearance: She is not toxic-appearing. HENT:      Head: Normocephalic and atraumatic. Mouth/Throat:      Pharynx: Oropharynx is clear. Eyes:      Extraocular Movements: Extraocular movements intact. Neck:      Vascular: No JVD. Cardiovascular:      Rate and Rhythm: Normal rate and regular rhythm. Heart sounds: No murmur heard. Pulmonary:      Effort: Pulmonary effort is normal.      Breath sounds: Normal breath sounds.  No decreased 0.1 0.0 - 0.6 K/uL    Basophils Absolute 0.0 0.0 - 0.2 K/uL   CMP   Result Value Ref Range    Sodium 137 136 - 145 mmol/L    Potassium 4.1 3.5 - 5.1 mmol/L    Chloride 96 (L) 99 - 110 mmol/L    CO2 30 21 - 32 mmol/L    Anion Gap 11 3 - 16    Glucose 148 (H) 70 - 99 mg/dL    BUN 14 7 - 20 mg/dL    Creatinine 0.8 0.6 - 1.2 mg/dL    GFR Non-African American >60 >60    GFR African American >60 >60    Calcium 9.3 8.3 - 10.6 mg/dL    Total Protein 7.0 6.4 - 8.2 g/dL    Albumin 3.6 3.4 - 5.0 g/dL    Albumin/Globulin Ratio 1.1 1.1 - 2.2    Total Bilirubin <0.2 0.0 - 1.0 mg/dL    Alkaline Phosphatase 170 (H) 40 - 129 U/L    ALT 13 10 - 40 U/L    AST 13 (L) 15 - 37 U/L   Troponin   Result Value Ref Range    Troponin <0.01 <0.01 ng/mL   Brain Natriuretic Peptide   Result Value Ref Range    Pro- (H) 0 - 124 pg/mL   TSH with Reflex   Result Value Ref Range    TSH 1.74 0.27 - 4.20 uIU/mL   EKG 12 Lead   Result Value Ref Range    Ventricular Rate 79 BPM    Atrial Rate 79 BPM    P-R Interval 148 ms    QRS Duration 100 ms    Q-T Interval 414 ms    QTc Calculation (Bazett) 474 ms    R Axis -18 degrees    T Axis 79 degrees    Diagnosis       Normal sinus rhythmSeptal infarct , age undeterminedAbnormal ECGWhen compared with ECG of 20-DEC-2020 10:08,No significant change was found       ED BEDSIDE ULTRASOUND:  No results found. RECENT VITALS:  BP: (!) 142/72,Temp: 98.7 °F (37.1 °C), Heart Rate: 82, Resp: 16, SpO2: 96 %     Procedures         ED Course     Nursing Notes, Past Medical Hx, Past Surgical Hx, Social Hx,Allergies, and Family Hx were reviewed. patient was given the following medications:  No orders of the defined types were placed in this encounter. CONSULTS:  None    MEDICAL DECISIONMAKING / ASSESSMENT / Osei Shannan is a 58 y.o. female who presents with concern for hypoxic episodes at home.   On arrival she was mildly hypertensive to 142/72, afebrile, heart rate of 82, normal respiratory rate of 16 and satting at 96% on room air. Please see exam above. Given history exam I feel that hypoxic episodes are related to sleep apnea. When alert and oriented her oxygen saturations are within normal limits. She has normal work of breathing. I did consider underlying ACS, CHF, COPD, pneumonia, COVID-19. She has no stridor therefore doubt underlying airway obstruction. I obtained labs and imaging studies as noted below    I interpreted the labs and note  CBC with no leukocytosis, mildly decreased hemoglobin at 11.9, normal hematocrit of 36.2, normal platelets  COVID negative  BNP elevated to 804 however this appears to be chronic as she has had elevations up to 1000 in the past.  CMP was normal renal function, hyperglycemia to 148, elevated alk phos which appears to be chronically elevated and stable, normal T bili  Negative troponin  I did obtain TSH per sisters request as she is due for thyroid check in the next few days. I interpreted the imaging and note  Chest x-ray nonspecific pulmonary infiltrates likely viral.    Overall reassuring work-up. Her EKG with no ischemic change and troponin negative therefore doubt underlying cardiac process. Her chest x-ray did show some nonspecific pulmonary infiltrates however she has a normal white count and no fever therefore I decided not to treat with antibiotics. Her BNP was elevated however there is no evidence of pulmonary edema on x-ray and she does not appear hypervolemic. She has close follow-up with Dr. Korey Villalba with pulmonology tomorrow morning. Therefore I feel patient is stable for discharge at this home. All questions and concerns were addressed. Strict return precautions reviewed. Clinical Impression     1. Shortness of breath    2. Dyspnea, unspecified type    3.  Obstructive sleep apnea        Disposition     PATIENT REFERRED TO:  MD Forrest Smyth  [phone]    In 1 day      DISCHARGE MEDICATIONS:  New Prescriptions    No medications on file       DISPOSITION Decision To Discharge 08/14/2022 03:49:40 PM         Colt Stone MD  08/14/22 [id number]

## 2022-08-14 NOTE — ED NOTES
Pt resting in bed at this time. No change in condition. Sister remains at bedside. Call light remains in reach.      Felicity Hall RN  08/14/22 [id number]

## 2022-08-15 ENCOUNTER — OFFICE VISIT (OUTPATIENT)
Dept: PULMONOLOGY | Age: 62
End: 2022-08-15
Payer: MEDICARE

## 2022-08-15 VITALS
HEIGHT: 61 IN | HEART RATE: 76 BPM | RESPIRATION RATE: 16 BRPM | DIASTOLIC BLOOD PRESSURE: 74 MMHG | TEMPERATURE: 97.6 F | OXYGEN SATURATION: 94 % | BODY MASS INDEX: 28.89 KG/M2 | WEIGHT: 153 LBS | SYSTOLIC BLOOD PRESSURE: 126 MMHG

## 2022-08-15 DIAGNOSIS — J96.11 CHRONIC RESPIRATORY FAILURE WITH HYPOXIA (HCC): ICD-10-CM

## 2022-08-15 DIAGNOSIS — F71 MODERATE INTELLECTUAL DISABILITY: ICD-10-CM

## 2022-08-15 DIAGNOSIS — G47.33 OBSTRUCTIVE SLEEP APNEA SYNDROME: ICD-10-CM

## 2022-08-15 DIAGNOSIS — J45.40 MODERATE PERSISTENT ASTHMA WITHOUT COMPLICATION: Primary | ICD-10-CM

## 2022-08-15 LAB
EKG ATRIAL RATE: 79 BPM
EKG DIAGNOSIS: NORMAL
EKG P-R INTERVAL: 148 MS
EKG Q-T INTERVAL: 414 MS
EKG QRS DURATION: 100 MS
EKG QTC CALCULATION (BAZETT): 474 MS
EKG R AXIS: -18 DEGREES
EKG T AXIS: 79 DEGREES
EKG VENTRICULAR RATE: 79 BPM

## 2022-08-15 PROCEDURE — 3017F COLORECTAL CA SCREEN DOC REV: CPT | Performed by: NURSE PRACTITIONER

## 2022-08-15 PROCEDURE — 99213 OFFICE O/P EST LOW 20 MIN: CPT | Performed by: NURSE PRACTITIONER

## 2022-08-15 PROCEDURE — G8427 DOCREV CUR MEDS BY ELIG CLIN: HCPCS | Performed by: NURSE PRACTITIONER

## 2022-08-15 PROCEDURE — 1036F TOBACCO NON-USER: CPT | Performed by: NURSE PRACTITIONER

## 2022-08-15 PROCEDURE — 93010 ELECTROCARDIOGRAM REPORT: CPT | Performed by: INTERNAL MEDICINE

## 2022-08-15 PROCEDURE — G8417 CALC BMI ABV UP PARAM F/U: HCPCS | Performed by: NURSE PRACTITIONER

## 2022-08-15 ASSESSMENT — ENCOUNTER SYMPTOMS
WHEEZING: 1
COUGH: 0
NAUSEA: 0
DIARRHEA: 0
VOMITING: 0
CHEST TIGHTNESS: 0
RHINORRHEA: 0
ABDOMINAL PAIN: 0
EYE PAIN: 0
SHORTNESS OF BREATH: 1

## 2022-08-15 NOTE — PROGRESS NOTES
Roel Jauregui is a 58 y.o. who comes in today for Follow-up (6 wk hypoxia)  She was in the ED yesterday, 8/14/22 with SOB. She recently had an overnight ox on 3 L nc 7/20/22 which shows significant desaturations in there oxygen levels. She has known LYNNETTE however she is not able to tolerate PAP therapy despite medical need. She is not a candidate for Inspire. There was some confusion about her inhalers that need clarified. She is using her Symbicort  2 puffs in the morning and using albuterol  once a day. She is having some throat irritation with Symbicort however it is not clear that she is gargling after use. She is here today with caregiver and sister. HPI    ED encounter 8/14/22   Roel Jauregui is a 58 y.o. female who presents with concern for hypoxic episodes at home. On arrival she was mildly hypertensive to 142/72, afebrile, heart rate of 82, normal respiratory rate of 16 and satting at 96% on room air. Please see exam above. Given history exam I feel that hypoxic episodes are related to sleep apnea. When alert and oriented her oxygen saturations are within normal limits. She has normal work of breathing. I did consider underlying ACS, CHF, COPD, pneumonia, COVID-19. She has no stridor therefore doubt underlying airway obstruction. I obtained labs and imaging studies as noted below     I interpreted the labs and note  CBC with no leukocytosis, mildly decreased hemoglobin at 11.9, normal hematocrit of 36.2, normal platelets  COVID negative  BNP elevated to 804 however this appears to be chronic as she has had elevations up to 1000 in the past.  CMP was normal renal function, hyperglycemia to 148, elevated alk phos which appears to be chronically elevated and stable, normal T bili  Negative troponin  I did obtain TSH per sisters request as she is due for thyroid check in the next few days.      I interpreted the imaging and note  Chest x-ray nonspecific pulmonary infiltrates likely viral. Overall reassuring work-up. Her EKG with no ischemic change and troponin negative therefore doubt underlying cardiac process. Her chest x-ray did show some nonspecific pulmonary infiltrates however she has a normal white count and no fever therefore I decided not to treat with antibiotics. Her BNP was elevated however there is no evidence of pulmonary edema on x-ray and she does not appear hypervolemic. She has close follow-up with Dr. Mike Raines with pulmonology tomorrow morning. Therefore I feel patient is stable for discharge at this home. All questions and concerns were addressed. Strict return precautions reviewed. Clinical Impression      1. Shortness of breath   2. Dyspnea, unspecified type   3. Obstructive sleep apnea         Summary of 7/13/22 OV:   ASSESSMENT/PLAN:  1. Chronic respiratory failure with hypoxia (HCC)  2. Moderate persistent asthma without complication  -     VT PORTABLE OXYGEN CONCENTRATOR  3. Obstructive sleep apnea  4. Class 1 obesity without serious comorbidity with body mass index (BMI) of 30.0 to 30.9 in adult, unspecified obesity type  5. Cough  6. Shortness of breath  7.  Chronic obstructive pulmonary disease, unspecified COPD type ([facility] [address]        Continue with:  symbicort 160/4.5 2puff twice a day, spacer  singulair (montelukast) once a day  Rescue albuterol (ventolin) as needed  Using spacer        May have issues with symbicort and the throat, had same problem with advair    Need to continue walk and exercise     At next  appt  Will consider dropping the symbicort to 2 puffs once day      Weight was 162 and then 157 and then up to 167 then at 145 and then at 161 and then at 162 and then at 156 to 154    Sleep study showed ahi of 55, this was redone 7/2016  Last study showed ahi of 33  bipap titration done 10/2017  Showed bipap of 11/7     dme is [employer]    autobipap with ipap max of 20 and epap min of 13 and ps of 4  I don't have access via [employer]                 Seen by Dr. Gilford Sievert for [place] but still not a candidate for inspire        Stopped using auto BiPAP because the patient would keep pulling off the machine at night and not using it for through the night  It was becoming a struggle to try to keep her compliant     Changed her to oxygen only at night  Continue with oxygen 3 L/min at night           Was hospitalized 4/2017 with dx of Acute CHF  Would suggest taking Lasix (furosemide) in the day, like noon rather than at night      Was hospitalized at [facility] with [address] in December 2020  She has recovered         Past Medical History:   Diagnosis Date    Asthma     Blind left eye     Cardiomyopathy ([address]     CHF (congestive heart failure) ([facility] [address]     COVID-19 12/20/2020    Depression     Diabetes     Dizziness and giddiness     GALLAGHER (dyspnea on exertion)     Fractures     Hyperthyroidism     Hypoxemia     Mental retardation     Mitral valve disorder     Mixed hyperlipidemia     Profound impairment, one eye, impairment level not further specified     Sleep apnea     Toxic uninodular goiter     Vitamin D deficiency         Past Surgical History:   Procedure Laterality Date    CHOLECYSTECTOMY      COLONOSCOPY      DIAGNOSTIC CARDIAC CATH LAB PROCEDURE      HYSTERECTOMY (CERVIX STATUS UNKNOWN)          Family History   Problem Relation Age of Onset    Ovarian Cancer Mother     Lung Cancer Father     Breast Cancer Sister     Cervical Cancer Sister         Allergies   Allergen Reactions    Penicillins        Current Outpatient Medications   Medication Sig Dispense Refill    estradiol (CLIMARA) 0.025 MG/24HR       budesonide-formoterol (SYMBICORT) 160-4.5 MCG/ACT AERO INHALE 2 PUFFS INTO THE LUNGS 2 TIMES DAILY 10.2 g 2    albuterol sulfate HFA (PROVENTIL;VENTOLIN;PROAIR) 108 (90 Base) MCG/ACT inhaler USE 2 PUFFS EVERY 6 (SIX) HOURS AS NEEDED.  18 g 10    VITAMIN D PO Take 2 tablets by mouth daily      loratadine (CLARITIN) 10 MG capsule Take 10 mg by mouth daily      fluticasone (FLONASE) 50 MCG/ACT nasal spray fluticasone propionate 50 mcg/actuation nasal spray,suspension      metoprolol succinate (TOPROL XL) 25 MG extended release tablet Take 25 mg by mouth daily      levETIRAcetam (KEPPRA) 500 MG tablet Take 1 tablet by mouth 2 times daily 60 tablet 3    aspirin 81 MG chewable tablet Take 1 tablet by mouth daily 30 tablet 3    magnesium oxide (MAG-OX) 400 MG tablet Take 400 mg by mouth daily      furosemide (LASIX) 40 MG tablet Take 40 mg by mouth 2 times daily      buPROPion (WELLBUTRIN SR) 200 MG extended release tablet Take 200 mg by mouth daily      NEXIUM 40 MG delayed release capsule Take 40 mg by mouth every morning (before breakfast)       clomiPRAMINE (ANAFRANIL) 75 MG capsule Take 150 mg by mouth nightly       atorvastatin (LIPITOR) 10 MG tablet Take 10 mg by mouth nightly       ARIPiprazole (ABILIFY) 10 mg tablet Take 10 mg by mouth Daily with lunch       Multiple Vitamin (MULTI-DAY PO) Take by mouth      montelukast (SINGULAIR) 10 MG tablet TAKE 1 TABLET BY MOUTH AT BEDTIME. 31 tablet 11    ADVAIR DISKUS 250-50 MCG/DOSE AEPB INHALE 1 PUFF INTO THE LUNGS 2 TIMES DAILY (Patient not taking: Reported on 6/9/2022) 60 each 3    isosorbide mononitrate (IMDUR) 30 MG extended release tablet Take 30 mg by mouth daily  (Patient not taking: Reported on 6/9/2022)      losartan (COZAAR) 25 MG tablet Take 50 mg by mouth daily (Patient not taking: Reported on 11/23/2021)      hydrocortisone 1 % cream Apply topically 2 times daily as needed (apply topically for athlete's foot)      Estradiol 0.025 MG/24HR PTTW Place 1 patch onto the skin once a week       metoclopramide (REGLAN) 5 MG tablet Take 5 mg by mouth 4 times daily (before meals and nightly)        No current facility-administered medications for this visit. Review of Systems   Constitutional:  Negative for chills, fatigue and fever. HENT:  Negative for postnasal drip and rhinorrhea.     Eyes:  Negative for pain and visual disturbance. Wears glasses   Respiratory:  Positive for shortness of breath (with exertion) and wheezing (varies). Negative for cough and chest tightness. Cardiovascular:  Negative for chest pain, palpitations and leg swelling. Gastrointestinal:  Negative for abdominal pain, diarrhea, nausea and vomiting. Genitourinary:  Negative for difficulty urinating. Musculoskeletal: Negative. Skin: Negative. Neurological:  Negative for dizziness, numbness and headaches. Psychiatric/Behavioral:  The patient is nervous/anxious (treated with meds). /74   Pulse 76   Temp 97.6 °F (36.4 °C) (Temporal)   Resp 16   Ht 5' 1\" (1.549 m)   Wt 153 lb (69.4 kg)   SpO2 94% Comment: ra  BMI 28.91 kg/m²         Imaging /Test:   8/14/22 CXR:   EXAMINATION:   ONE XRAY VIEW OF THE CHEST       8/14/2022 2:21 pm       COMPARISON:   CT chest 06/30/2022 and chest radiograph [id number]       HISTORY:   ORDERING SYSTEM PROVIDED HISTORY: Shortness of breath       FINDINGS:   The cardiac silhouette is enlarged. Calcifications involving the aorta   reflect atherosclerosis. The mediastinal and hilar silhouettes appear   unremarkable. Scattered pulmonary opacities bilateral mid and lower lungs. No pleural   effusion evident. No pneumothorax is seen. No acute osseous abnormality is identified. Impression   1. Nonspecific pulmonary infiltrates can be seen with atypical/viral   pneumonia. 2. Calcific atherosclerosis aorta. 3. Cardiomegaly. Physical Exam  Vitals reviewed. Constitutional:       General: She is not in acute distress. Appearance: Normal appearance. She is not ill-appearing, toxic-appearing or diaphoretic. HENT:      Head: Normocephalic and atraumatic. Nose: Nose normal. No congestion or rhinorrhea. Mouth/Throat:      Mouth: Mucous membranes are moist.      Pharynx: Oropharynx is clear. No oropharyngeal exudate or posterior oropharyngeal erythema. Cardiovascular:      Rate and Rhythm: Normal rate. Pulmonary:      Effort: Pulmonary effort is normal. No respiratory distress. Breath sounds: Normal breath sounds. No stridor. No wheezing, rhonchi or rales. Chest:      Chest wall: No tenderness. Abdominal:      Palpations: Abdomen is soft. Tenderness: There is no abdominal tenderness. There is no guarding or rebound. Musculoskeletal:         General: Normal range of motion. Cervical back: Neck supple. Right lower leg: No edema. Left lower leg: No edema. Lymphadenopathy:      Cervical: No cervical adenopathy. Skin:     General: Skin is warm and dry. Capillary Refill: Capillary refill takes less than 2 seconds. Neurological:      Mental Status: She is alert and oriented to person, place, and time. Psychiatric:         Mood and Affect: Mood normal.         Behavior: Behavior normal.         Thought Content: Thought content normal.         Judgment: Judgment normal.         Assessment/Plan:     1. Moderate persistent asthma without complication  2. Obstructive sleep apnea syndrome  3. Chronic respiratory failure with hypoxia (HCC)  4. Moderate intellectual disability     [facility] summary and radiology reports reviewed. Prior pulmonary OV note, PFT report and recent chest imaging report reviewed. I have personally reviewed and summarized the old records and/or obtained further history from someone other than the patient. Reviewed present meds and side effects. Reviewed proper inhaler usage. Discussed when to call with worsening symptoms such as increased shortness of breath, productive cough, wheezing or symptoms not responding to treatment plan. She is to continue oxygen at 5 L nc at night and 2-3 l during the day as needed to keep O2 Sat > or = to 90%. Discussed LYNNETTE not controlled can put more strain on heart.            Nevin Ocampo, APRN - CNP

## 2022-08-15 NOTE — PATIENT INSTRUCTIONS
Your current pulmonary medications are controlling/treating your Asthma. Stay compliant. Reviewed present pulmonary medications and side effects. Reviewed proper inhaler usage. Try Symbicort 1 puff twice a day with spacer. Rinse mouth out after use to prevent hoarseness and thrush. Your albuterol can be uses as needed for shortness of breath, wheezing, cough. Continue oxygen at night 5 L nc and as needed during the day to keep oxygen levels above 90% as discussed. Return to office with Dr. Robby Zhu in 3 months     Call for increased Shortness of breath, sputum production, wheezing, or cough. Remember to bring a list of pulmonary medications and any CPAP or BiPAP machines to your next appointment with the office. Please keep all of your future appointments scheduled by [address], [facility] office. Out of respect for other patients and providers, you may be asked to reschedule your appointment if you arrive later than your scheduled appointment time. Appointments cancelled less than 24hrs in advance will be considered a no show. Patients with three missed appointments within 1 year or four missed appointments within 2 years can be dismissed from the practice. Please be aware that our physicians are required to work in the Intensive Care Unit at [facility].  Your appointment may need to be rescheduled if they are designated to work during your appointment time. You may receive a survey regarding the care you received during your visit. Your input is valuable to us. We encourage you to complete and return your survey. We hope you will choose us in the future for your healthcare needs. Pt instructed of all future appointment dates & times, including radiology, labs, procedures & referrals. If procedures were scheduled preparation instructions provided. Instructions on future appointments with [facility] were given.

## 2022-08-15 NOTE — PROGRESS NOTES
MA Communication:   The following orders are received by verbal communication from Bethel Vilchis     Orders include:    3 month f/u with Dr Korey Villalba

## 2022-08-16 RX ORDER — BUDESONIDE AND FORMOTEROL FUMARATE DIHYDRATE 160; 4.5 UG/1; UG/1
1 AEROSOL RESPIRATORY (INHALATION) 2 TIMES DAILY
Qty: 10.2 G | Refills: 3 | Status: SHIPPED | OUTPATIENT
Start: 2022-08-16

## 2022-10-21 ENCOUNTER — OFFICE VISIT (OUTPATIENT)
Dept: PULMONOLOGY | Age: 62
End: 2022-10-21
Payer: MEDICARE

## 2022-10-21 VITALS
WEIGHT: 151 LBS | BODY MASS INDEX: 28.51 KG/M2 | DIASTOLIC BLOOD PRESSURE: 73 MMHG | RESPIRATION RATE: 20 BRPM | HEIGHT: 61 IN | OXYGEN SATURATION: 92 % | HEART RATE: 74 BPM | TEMPERATURE: 98.8 F | SYSTOLIC BLOOD PRESSURE: 123 MMHG

## 2022-10-21 DIAGNOSIS — E66.9 CLASS 1 OBESITY WITHOUT SERIOUS COMORBIDITY WITH BODY MASS INDEX (BMI) OF 30.0 TO 30.9 IN ADULT, UNSPECIFIED OBESITY TYPE: ICD-10-CM

## 2022-10-21 DIAGNOSIS — J45.40 MODERATE PERSISTENT ASTHMA WITHOUT COMPLICATION: Primary | ICD-10-CM

## 2022-10-21 DIAGNOSIS — F71 MODERATE INTELLECTUAL DISABILITY: ICD-10-CM

## 2022-10-21 DIAGNOSIS — G47.33 OBSTRUCTIVE SLEEP APNEA SYNDROME: ICD-10-CM

## 2022-10-21 PROCEDURE — 1036F TOBACCO NON-USER: CPT | Performed by: INTERNAL MEDICINE

## 2022-10-21 PROCEDURE — G8417 CALC BMI ABV UP PARAM F/U: HCPCS | Performed by: INTERNAL MEDICINE

## 2022-10-21 PROCEDURE — 3017F COLORECTAL CA SCREEN DOC REV: CPT | Performed by: INTERNAL MEDICINE

## 2022-10-21 PROCEDURE — G8484 FLU IMMUNIZE NO ADMIN: HCPCS | Performed by: INTERNAL MEDICINE

## 2022-10-21 PROCEDURE — 99214 OFFICE O/P EST MOD 30 MIN: CPT | Performed by: INTERNAL MEDICINE

## 2022-10-21 PROCEDURE — G8427 DOCREV CUR MEDS BY ELIG CLIN: HCPCS | Performed by: INTERNAL MEDICINE

## 2022-10-21 ASSESSMENT — ENCOUNTER SYMPTOMS
RESPIRATORY NEGATIVE: 1
GASTROINTESTINAL NEGATIVE: 1
ALLERGIC/IMMUNOLOGIC NEGATIVE: 1
EYES NEGATIVE: 1

## 2022-10-21 NOTE — PROGRESS NOTES
MA Communication:   The following orders are received by verbal communication from Evangelina Saucedo MD    Orders include:  6 mo fu scheduled 4/14/23

## 2022-10-21 NOTE — PROGRESS NOTES
Linda Menjivar (:  1960) is a 58 y.o. female,Established patient, here for evaluation of the following chief complaint(s):  Sleep Apnea and Asthma         ASSESSMENT/PLAN:  1. Moderate persistent asthma without complication  2. Obstructive sleep apnea syndrome  3. Moderate intellectual disability  4. Class 1 obesity without serious comorbidity with body mass index (BMI) of 30.0 to 30.9 in adult, unspecified obesity type      Continue with:  symbicort 160/4.5 1 puff twice a day, spacer  singulair (montelukast) once a day  Rescue albuterol (ventolin) as needed  Using spacer       May have issues with symbicort and the throat, had same problem with advair    Need to continue walk and exercise         Weight was 162 and then 157 and then up to 167 then at 145 and then at 161 and then at 162 and then at 156 to 154 to 151    Sleep study showed ahi of 55, this was redone 2016  Last study showed ahi of 33  bipap titration done 10/2017  Showed bipap of 11/7     dme is [employer]    autobipap with ipap max of 20 and epap min of 13 and ps of 4  I don't have access via airview     Seen by Dr. Patience Sherman for [place] but still not a candidate for inspire      Stopped using auto BiPAP because the patient would keep pulling off the machine at night and not using it for through the night  It was becoming a struggle to try to keep her compliant    Changed her to oxygen only at night  Continue with oxygen 3 L/min at night          Was hospitalized 2017 with dx of Acute CHF  Would suggest taking Lasix (furosemide) in the day, like noon rather than at night     Was hospitalized at [facility] with [address] in 2020  She has recovered  Last cxr was done 2020    cxr done 2021     Impression   Bilateral linear opacities likely representing areas of   scarring/postinflammatory fibrosis.                  Hypoxemia and shortness of breath starting around 2022  CTPA done 2022  Impression   No pulmonary embolus is identified. Parenchymal pattern suggestive of air trapping and atelectasis. Pneumonia/pneumonitis is considered less likely. Moderate cardiomegaly. Last BNP done on 6/17/2022 was 300  Last eosinophil done on 6/17/2022 was 223    Last echo done 6/22/2022  Study Conclusions     - Left ventricle: The cavity size is normal. Wall thickness is normal. Systolic function was mildly     to moderately reduced. The estimated ejection fraction was in the range of 40% to 45%. Diffuse     hypokinesis. - Mitral valve: Mild regurgitation.   - Right ventricle: Systolic function was normal by objective interpretation. TAPSE: 2.2cm. Stress test done 9/30/22  Interpetation Summary:   The LV EF is 53%   Normal global and regional wall motion in all territories. There is a small size, fixed defect in the anteroapical wall consistent with   soft tissue attenuation. o Non-diagnostic ECG for ischemia with pharmocologic stress. o Negative nuclear stress imaging for inducible ischemia. 6-minute walk test done in the office  Patient was only able to get out about 30 seconds and started dropping O2 saturations on room air down to 76%  Stopped the walking  Will need oxygen 2 to 3 L/min with a portable oxygen concentrator    Most likely there may be a complex disorder going on at this time, a problem with her low ejection fraction along with the inability to take deep breaths which may be contributing to a hypoxic state when walking. noct pulse ox on 3 lpm at night        However options  Continue with oxygen at 3-4 lpm  Bipap, probably would not tolerate            Vaccinations  Flushot up todate  Pnuemovac  Covid no but had covid in 12/2020    RTC in 6 months              No follow-ups on file. I have personally reviewed and summarized the old records and/or obtained further history from someone other than the patient.   I have independently reviewed the images and reviewed with patient    I have reviewed the lab tests, radiology reports and medications    I have downloaded and interpreted the cpap/bipap/pap data. I have made adjustments as described    Reviewed present meds and side effects. Continue present meds. Stay compliant. Call if worsens. Reviewed proper inhaler usage          Subjective   SUBJECTIVE/OBJECTIVE:  Using new bipap  And feeling well  However still having issues of keeping it on all night    Some Headache? always complains aobut this  No bloating  No burping  No chest pain    no ear popping    Going to bed 930 am and waking up 630    Breathing well  No chest pain    symbicort bid with spacer    Cough, no    Exercising more often, doing 2 laps    Some sob at [facility]    Was hospitalized in Feb 2020  And was on oxygen but now Using only with cpap    Overall dong well           Was seen by Dr. Angelica Abdalla in regards to possible inspire placement  Seen 5/20/2021      ASSESSMENT:  1. LYNNETTE (obstructive sleep apnea)   2. BMI 35.0-35.9,adult   3. Intellectual disability     PLAN:  -We reviewed her history. She has severe LYNNETTE which seems to be refractory to CPAP therapy even when she is compliant. However, she overwhelmingly is noncompliant and has not been wearing her mask. Unfortunately, I feel that her options are fairly limited at this point. Other than positive pressure ventilation therapy, I feel that her next best option is likely weight loss which we discussed. From a surgical standpoint, I do not feel that complex pharyngeal, laryngeal, or mandibular surgery would be likely to result in any meaningful benefit and would also come with significant pain. She is edentulous and therefore cannot try an oral appliance. 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  The only consideration from my point of view would be if she wanted to have a repeat DISE to verify her previous pattern of collapse, although this would be unlikely to change. Patient's caregiver plans to discuss with patient's sister who is her guardian, and expects that she will likely contact me to discuss further. In the office with melecio  Was hospitalized in 12/2020 with covid  Not really interested in getting covid vaccine    Asthma has been well controlled  Some exe dyspnea            Last visit  Over the last week the patient has increased oxygen requirements  I had sent for a CTPA which was negative for PE  Having more issues with hypoxemia with walking  No chest pains  Asymptomatic  Does get short of breath with walking  Trying to have oxygen available all the time however right now ordered at night only  No chest pains or palpitations      When off the symbicort no further issues. Since last visit  Patient has been taking Symbicort 1 puff twice daily  Has not been using her albuterol  Able to sleep well with the oxygen but still manages to have this taken off at times  She sleeps with about 3 to 4 L of oxygen  She has been using oxygen during the daytime periodically in order to help with breathing  She is not had any issues with swelling or shortness of breath  Has not had any issues with her diuretics  She is been evaluated by cardiology and had a recent stress test  No chest pains or palpitations  Has been doing well at the centers and sister and  were with the patient  No chest pains or palpitations    Asthma  Her past medical history is significant for asthma. Review of Systems   Constitutional: Negative. HENT: Negative. Eyes: Negative. Respiratory: Negative. Cardiovascular: Negative. Gastrointestinal: Negative. Endocrine: Negative. Genitourinary: Negative. Musculoskeletal: Negative. Skin: Negative. Allergic/Immunologic: Negative. Neurological: Negative. Hematological: Negative. Psychiatric/Behavioral: Negative.         Vitals:    10/21/22 0845   BP: 123/73   Site: Left Upper Arm   Position: Sitting   Cuff Size: Medium Adult   Pulse: 74   Resp: 20   Temp: 98.8 °F (37.1 °C)   TempSrc: Temporal   SpO2: 92%   Weight: 151 lb (68.5 kg)   Height: 5' 1\" (1.549 m)       Objective   Physical Exam  Vitals and nursing note reviewed. Constitutional:       General: She is not in acute distress. Appearance: Normal appearance. She is not ill-appearing. HENT:      Head: Normocephalic and atraumatic. Right Ear: External ear normal.      Left Ear: External ear normal.      Nose: Nose normal.      Mouth/Throat:      Mouth: Mucous membranes are moist.      Pharynx: Oropharynx is clear. Comments: Mallampati 3  Eyes:      General: No scleral icterus. Extraocular Movements: Extraocular movements intact. Conjunctiva/sclera: Conjunctivae normal.      Pupils: Pupils are equal, round, and reactive to light. Cardiovascular:      Rate and Rhythm: Normal rate and regular rhythm. Pulses: Normal pulses. Heart sounds: Normal heart sounds. No murmur heard. No friction rub. Pulmonary:      Effort: Pulmonary effort is normal. No respiratory distress. Breath sounds: No stridor. Rales present. No wheezing or rhonchi. Comments: Crackles at bases b/l  Chest:      Chest wall: No tenderness. Abdominal:      General: Abdomen is flat. Bowel sounds are normal. There is no distension. Tenderness: There is no abdominal tenderness. There is no guarding. Musculoskeletal:         General: No swelling or tenderness. Normal range of motion. Cervical back: Normal range of motion and neck supple. No rigidity. Right lower leg: Edema present. Left lower leg: Edema present. Skin:     General: Skin is warm and dry. Coloration: Skin is not jaundiced. Neurological:      General: No focal deficit present. Mental Status: She is alert and oriented to person, place, and time. Mental status is at baseline.       Cranial Nerves: No cranial nerve deficit. Sensory: No sensory deficit. Motor: No weakness. Gait: Gait normal.   Psychiatric:         Mood and Affect: Mood normal.         Thought Content:  Thought content normal.         Judgment: Judgment normal.                An electronic signature was used to authenticate this note.    --Soraida Phillips MD

## 2022-10-21 NOTE — LETTER
[address] [facility]  [address] [facility] [address] [address]  Phone: [phone]  Fax: [phone]    Kendal Nolasco MD        October 21, 2022     Patient: Tayo Espinal   YOB: 1960   Date of Visit: 10/21/2022     10/21/22        Adelaide Duffy      I have seen this patient in the office today and wanted to communicate my findings and recommendations. Patient Instructions       ASSESSMENT/PLAN:  1. Moderate persistent asthma without complication  2. Obstructive sleep apnea syndrome  3. Moderate intellectual disability  4.  Class 1 obesity without serious comorbidity with body mass index (BMI) of 30.0 to 30.9 in adult, unspecified obesity type      Continue with:  symbicort 160/4.5 1 puff twice a day, spacer  singulair (montelukast) once a day  Rescue albuterol (ventolin) as needed  Using spacer       May have issues with symbicort and the throat, had same problem with advair    Need to continue walk and exercise         Weight was 162 and then 157 and then up to 167 then at 145 and then at 161 and then at 162 and then at 156 to 154 to 151    Sleep study showed ahi of 55, this was redone 7/2016  Last study showed ahi of 33  bipap titration done 10/2017  Showed bipap of 11/7     dme is [employer]    autobipap with ipap max of 20 and epap min of 13 and ps of 4  I don't have access via [employer]     Seen by Dr. Jaylen Snow for [place] but still not a candidate for inspire      Stopped using auto BiPAP because the patient would keep pulling off the machine at night and not using it for through the night  It was becoming a struggle to try to keep her compliant    Changed her to oxygen only at night  Continue with oxygen 3 L/min at night          Was hospitalized 4/2017 with dx of Acute CHF  Would suggest taking Lasix (furosemide) in the day, like noon rather than at night     Was hospitalized at [facility] with [address] in December 2020  She has recovered  Last cxr was done 12/2020    cxr done 7/7/2021     Impression   Bilateral linear opacities likely representing areas of   scarring/postinflammatory fibrosis. Hypoxemia and shortness of breath starting around 6/27/2022  CTPA done 6/30/2022  Impression   No pulmonary embolus is identified. Parenchymal pattern suggestive of air trapping and atelectasis. Pneumonia/pneumonitis is considered less likely. Moderate cardiomegaly. Last BNP done on 6/17/2022 was 300  Last eosinophil done on 6/17/2022 was 223    Last echo done 6/22/2022  Study Conclusions     - Left ventricle: The cavity size is normal. Wall thickness is normal. Systolic function was mildly     to moderately reduced. The estimated ejection fraction was in the range of 40% to 45%. Diffuse     hypokinesis. - Mitral valve: Mild regurgitation.   - Right ventricle: Systolic function was normal by objective interpretation. TAPSE: 2.2cm. Stress test done 9/30/22  Interpetation Summary:   The LV EF is 53%   Normal global and regional wall motion in all territories. There is a small size, fixed defect in the anteroapical wall consistent with   soft tissue attenuation. o Non-diagnostic ECG for ischemia with pharmocologic stress. o Negative nuclear stress imaging for inducible ischemia. 6-minute walk test done in the office  Patient was only able to get out about 30 seconds and started dropping O2 saturations on room air down to 76%  Stopped the walking  Will need oxygen 2 to 3 L/min with a portable oxygen concentrator    Most likely there may be a complex disorder going on at this time, a problem with her low ejection fraction along with the inability to take deep breaths which may be contributing to a hypoxic state when walking. noct pulse ox on 3 lpm at night        However options  1. Continue with oxygen at 3-4 lpm  2.  Bipap, probably would not tolerate            Vaccinations  Flushot up todate  Pnuemovac  Covid no but had covid in 12/2020    RTC in 6 months                   Thank you for allowing me to assist in the care of the [facility] Fco Buckner MD

## 2022-10-21 NOTE — PATIENT INSTRUCTIONS
ASSESSMENT/PLAN:  1. Moderate persistent asthma without complication  2. Obstructive sleep apnea syndrome  3. Moderate intellectual disability  4. Class 1 obesity without serious comorbidity with body mass index (BMI) of 30.0 to 30.9 in adult, unspecified obesity type      Continue with:  symbicort 160/4.5 1 puff twice a day, spacer  singulair (montelukast) once a day  Rescue albuterol (ventolin) as needed  Using spacer       May have issues with symbicort and the throat, had same problem with advair    Need to continue walk and exercise         Weight was 162 and then 157 and then up to 167 then at 145 and then at 161 and then at 162 and then at 156 to 154 to 151    Sleep study showed ahi of 55, this was redone 7/2016  Last study showed ahi of 33  bipap titration done 10/2017  Showed bipap of 11/7     dme is [employer]    autobipap with ipap max of 20 and epap min of 13 and ps of 4  I don't have access via airview     Seen by Dr. Laura Mcgill for [place] but still not a candidate for inspire      Stopped using auto BiPAP because the patient would keep pulling off the machine at night and not using it for through the night  It was becoming a struggle to try to keep her compliant    Changed her to oxygen only at night  Continue with oxygen 3 L/min at night          Was hospitalized 4/2017 with dx of Acute CHF  Would suggest taking Lasix (furosemide) in the day, like noon rather than at night     Was hospitalized at [facility] with [address] in December 2020  She has recovered  Last cxr was done 12/2020    cxr done 7/7/2021     Impression   Bilateral linear opacities likely representing areas of   scarring/postinflammatory fibrosis. Hypoxemia and shortness of breath starting around 6/27/2022  CTPA done 6/30/2022  Impression   No pulmonary embolus is identified. Parenchymal pattern suggestive of air trapping and atelectasis. Pneumonia/pneumonitis is considered less likely. Moderate cardiomegaly. Last BNP done on 6/17/2022 was 300  Last eosinophil done on 6/17/2022 was 223    Last echo done 6/22/2022  Study Conclusions     - Left ventricle: The cavity size is normal. Wall thickness is normal. Systolic function was mildly     to moderately reduced. The estimated ejection fraction was in the range of 40% to 45%. Diffuse     hypokinesis. - Mitral valve: Mild regurgitation.   - Right ventricle: Systolic function was normal by objective interpretation. TAPSE: 2.2cm. Stress test done 9/30/22  Interpetation Summary:   The LV EF is 53%   Normal global and regional wall motion in all territories. There is a small size, fixed defect in the anteroapical wall consistent with   soft tissue attenuation. o Non-diagnostic ECG for ischemia with pharmocologic stress. o Negative nuclear stress imaging for inducible ischemia. 6-minute walk test done in the office  Patient was only able to get out about 30 seconds and started dropping O2 saturations on room air down to 76%  Stopped the walking  Will need oxygen 2 to 3 L/min with a portable oxygen concentrator    Most likely there may be a complex disorder going on at this time, a problem with her low ejection fraction along with the inability to take deep breaths which may be contributing to a hypoxic state when walking. noct pulse ox on 3 lpm at night        However options  Continue with oxygen at 3-4 lpm  Bipap, probably would not tolerate            Vaccinations  Flushot up todate  Pnuemovac  Covid no but had covid in 12/2020    RTC in 6 months    Remember to bring a list of pulmonary medications and any CPAP or BiPAP machines to your next appointment with the office. Please keep all of your future appointments scheduled by [address], [facility] office. Out of respect for other patients and providers, you may be asked to reschedule your appointment if you arrive later than your scheduled appointment time. Appointments cancelled less than 24hrs in advance will be considered a no show. Patients with three missed appointments within 1 year or four missed appointments within 2 years can be dismissed from the practice. Please be aware that our physicians are required to work in the Intensive Care Unit at [facility].  Your appointment may need to be rescheduled if they are designated to work during your appointment time. You may receive a survey regarding the care you received during your visit. Your input is valuable to us. We encourage you to complete and return your survey. We hope you will choose us in the future for your healthcare needs. Pt instructed of all future appointment dates & times, including radiology, labs, procedures & referrals. If procedures were scheduled preparation instructions provided. Instructions on future appointments with [facility] were given.

## 2022-11-04 ENCOUNTER — TELEPHONE (OUTPATIENT)
Dept: PULMONOLOGY | Age: 62
End: 2022-11-04

## 2022-11-04 NOTE — TELEPHONE ENCOUNTER
This is not a current medication Dr. Fer Pop is prescribing. Please have Gin call the prescribing provider for guidance.

## 2022-11-04 NOTE — TELEPHONE ENCOUNTER
Ro Booker is calling stating that Rick Bradford is getting winded again and Dr. Patel Pretty said if she starts to get that way he could increase her lasix for a short time to get her fluid levels down, she is on 60mg at the present time. Please advise.  Thanks

## 2022-11-19 ENCOUNTER — APPOINTMENT (OUTPATIENT)
Dept: GENERAL RADIOLOGY | Age: 62
End: 2022-11-19
Payer: MEDICARE

## 2022-11-19 ENCOUNTER — HOSPITAL ENCOUNTER (EMERGENCY)
Age: 62
Discharge: HOME | End: 2022-11-19
Attending: EMERGENCY MEDICINE
Payer: MEDICARE

## 2022-11-19 VITALS
OXYGEN SATURATION: 95 % | SYSTOLIC BLOOD PRESSURE: 129 MMHG | RESPIRATION RATE: 18 BRPM | DIASTOLIC BLOOD PRESSURE: 77 MMHG | TEMPERATURE: 98.4 F | HEART RATE: 86 BPM
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
## 2022-11-19 DIAGNOSIS — S40.012A CONTUSION OF LEFT SHOULDER, INITIAL ENCOUNTER: ICD-10-CM

## 2022-11-19 DIAGNOSIS — S29.019A THORACIC MYOFASCIAL STRAIN, INITIAL ENCOUNTER: Primary | ICD-10-CM

## 2022-11-19 PROCEDURE — 6370000000 HC RX 637 (ALT 250 FOR IP): Performed by: EMERGENCY MEDICINE

## 2022-11-19 PROCEDURE — 71101 X-RAY EXAM UNILAT RIBS/CHEST: CPT

## 2022-11-19 PROCEDURE — 73030 X-RAY EXAM OF SHOULDER: CPT

## 2022-11-19 PROCEDURE — 72170 X-RAY EXAM OF PELVIS: CPT

## 2022-11-19 PROCEDURE — 99283 EMERGENCY DEPT VISIT LOW MDM: CPT

## 2022-11-19 PROCEDURE — 73080 X-RAY EXAM OF ELBOW: CPT

## 2022-11-19 RX ORDER — METHOCARBAMOL 750 MG/1
750 TABLET, FILM COATED ORAL 4 TIMES DAILY
Qty: 40 TABLET | Refills: 0 | Status: SHIPPED | OUTPATIENT
Start: 2022-11-19 | End: 2022-11-29

## 2022-11-19 RX ORDER — METHOCARBAMOL 500 MG/1
750 TABLET, FILM COATED ORAL ONCE
Status: COMPLETED | OUTPATIENT
Start: 2022-11-19 | End: 2022-11-19

## 2022-11-19 RX ORDER — LIDOCAINE 4 G/G
1 PATCH TOPICAL ONCE
Status: DISCONTINUED | OUTPATIENT
Start: 2022-11-19 | End: 2022-11-19 | Stop reason: HOSPADM

## 2022-11-19 RX ORDER — LIDOCAINE 50 MG/G
1 PATCH TOPICAL DAILY
Qty: 10 PATCH | Refills: 0 | Status: SHIPPED | OUTPATIENT
Start: 2022-11-19 | End: 2022-11-29

## 2022-11-19 RX ADMIN — METHOCARBAMOL TABLETS 750 MG: 500 TABLET, COATED ORAL at 12:47

## 2022-11-19 ASSESSMENT — ENCOUNTER SYMPTOMS
COUGH: 0
WHEEZING: 0
VOMITING: 0
DIARRHEA: 0
NAUSEA: 0
RHINORRHEA: 0
PHOTOPHOBIA: 0
BACK PAIN: 1
SHORTNESS OF BREATH: 0
ABDOMINAL DISTENTION: 0

## 2022-11-19 NOTE — DISCHARGE INSTRUCTIONS
X-rays do not show any evidence of fracture. We are prescribing you numbing patches and anti-inflammatory cream to apply to the affected areas. Take as prescribed. Follow-up with your primary doctor next week for reassessment. Return to emergency department if any severe or emergent concerns difficulty breathing chest pain loss of consciousness or other emergencies.

## 2022-11-19 NOTE — ED PROVIDER NOTES
Emergency Department Provider Note  Location: [facility] EMERGENCY DEPARTMENT  11/19/2022     Patient Identification  Jess Donnelly is a 58 y.o. female    Chief Complaint  Back Pain (Pt fell on Thursday 11/17 and had a portable oxygen tank (PRN/ at night o2) on her back when she fell. She landed on the oxygen tank. Per caregiver, patient seemed to be sore but okay until this morning. Today patient states that she has severe left upper/mid back pain and is refusing to sit or lie down.)          HPI  (History provided by patient and counselor/therapist)  Patient is a 70-year-old female with intellectual delay, asthma CHF on oxygen nightly/as needed who presents 2 days after a witnessed fall. She complains of back pain left-sided and left shoulder pain. Reportedly had her oxygen tank on her back and fell backwards after she lost her step. Landed on the oxygen tank. Staff reports that she did not strike her head there is no loss of consciousness. She is not reportedly on blood thinners or anticoagulants. She is able to get up and walk at her baseline but today she is reporting worsening pain and is refusing to sit because of pain in the back left paraspinal area going up to the shoulder. No numbness or weakness no difficulty breathing. Tolerating p.o. adequate urine output passing stool. I have reviewed the following nursing documentation:  Allergies: Allergies   Allergen Reactions    Penicillins        Past medical history:  has a past medical history of Asthma, Blind left eye, Cardiomyopathy ([address] CHF (congestive heart failure) ([address] COVID-19 (12/20/2020), Depression, Diabetes, Dizziness and giddiness, GALLAGHER (dyspnea on exertion), Fractures, Hyperthyroidism, Hypoxemia, Mental retardation, Mitral valve disorder, Mixed hyperlipidemia, Profound impairment, one eye, impairment level not further specified, Sleep apnea, Toxic uninodular goiter, and Vitamin D deficiency.     Past surgical history:  has a past surgical history that includes Hysterectomy; Cholecystectomy; Diagnostic Cardiac Cath Lab Procedure; and Colonoscopy. Home medications:   Prior to Admission medications    Medication Sig Start Date End Date Taking? Authorizing Provider   lidocaine (LIDODERM) 5 % Place 1 patch onto the skin daily for 10 days 12 hours on, 12 hours off. 11/19/22 11/29/22 Yes Comfort Baltazra MD   diclofenac sodium (VOLTAREN) 1 % GEL Apply 2 g topically 4 times daily 11/19/22  Yes Comfort Baltazar MD   methocarbamol (ROBAXIN-750) 750 MG tablet Take 1 tablet by mouth 4 times daily for 10 days 11/19/22 11/29/22 Yes Comfort Baltazar MD   budesonide-formoterol (SYMBICORT) 160-4.5 MCG/ACT AERO Inhale 1 puff into the lungs in the morning and 1 puff before bedtime. Use with spacer. Rinse mouth out after use to prevent hoarseness and thrush. . 8/16/22   LILIA Cardoza CNP   Spacer/Aero-Holding Ginger Peter 1 Device by Does not apply route daily as needed (use as needed with inhaler) 8/16/22   LILIA Cardoza CNP   estradiol [facility]) 0.025 MG/24HR  7/19/22   Historical Provider, MD   Multiple Vitamin (MULTI-DAY PO) Take by mouth    Historical Provider, MD   albuterol sulfate HFA (PROVENTIL;VENTOLIN;PROAIR) 108 (90 Base) MCG/ACT inhaler USE 2 PUFFS EVERY 6 (SIX) HOURS AS NEEDED. 6/9/22   Analia Moore MD   montelukast (SINGULAIR) 10 MG tablet TAKE 1 TABLET BY MOUTH AT BEDTIME.  5/16/22   Analia Moore MD   ADVAIR DISKUS 250-50 MCG/DOSE AEPB INHALE 1 PUFF INTO THE LUNGS 2 TIMES DAILY 1/19/22   Analia Moore MD   VITAMIN D PO Take 2 tablets by mouth daily    Historical Provider, MD   loratadine (CLARITIN) 10 MG capsule Take 10 mg by mouth daily    Historical Provider, MD   fluticasone (FLONASE) 50 MCG/ACT nasal spray fluticasone propionate 50 mcg/actuation nasal spray,suspension    Historical Provider, MD   isosorbide mononitrate (IMDUR) 30 MG extended release tablet Take 30 mg by mouth daily   Patient not taking: No sig reported    Historical Provider, MD   metoprolol succinate (TOPROL XL) 25 MG extended release tablet Take 25 mg by mouth daily    Historical Provider, MD   losartan (COZAAR) 25 MG tablet Take 50 mg by mouth daily  Patient not taking: Reported on 11/23/2021    Historical Provider, MD   levETIRAcetam (KEPPRA) 500 MG tablet Take 1 tablet by mouth 2 times daily 4/28/20   Stacy Randall MD   aspirin 81 MG chewable tablet Take 1 tablet by mouth daily 4/29/20   Stacy Randall MD   magnesium oxide (MAG-OX) 400 MG tablet Take 400 mg by mouth daily    Historical Provider, MD   hydrocortisone 1 % cream Apply topically 2 times daily as needed (apply topically for athlete's foot)    Historical Provider, MD   furosemide (LASIX) 40 MG tablet Take 40 mg by mouth 2 times daily    Historical Provider, MD   buPROPion (WELLBUTRIN SR) 200 MG extended release tablet Take 200 mg by mouth daily    Historical Provider, MD   NEXIUM 40 MG delayed release capsule Take 40 mg by mouth every morning (before breakfast)  2/20/17   Historical Provider, MD   clomiPRAMINE (ANAFRANIL) 75 MG capsule Take 150 mg by mouth nightly  2/14/17   Historical Provider, MD   atorvastatin (LIPITOR) 10 MG tablet Take 10 mg by mouth nightly     Historical Provider, MD   ARIPiprazole (ABILIFY) 10 mg tablet Take 10 mg by mouth Daily with lunch     Historical Provider, MD   Estradiol 0.025 MG/24HR PTTW Place 1 patch onto the skin once a week   Patient not taking: Reported on 10/21/2022    Historical Provider, MD   metoclopramide (REGLAN) 5 MG tablet Take 5 mg by mouth 4 times daily (before meals and nightly)     Historical Provider, MD       Social history:  reports that she has never smoked. She has never used smokeless tobacco. She reports that she does not drink alcohol and does not use drugs.     Family history:    Family History   Problem Relation Age of Onset    Ovarian Cancer Mother     Lung Cancer Father     Breast Cancer Sister     Cervical Cancer Sister ROS  Review of Systems   Constitutional:  Negative for chills and fever. HENT:  Negative for congestion and rhinorrhea. Eyes:  Negative for photophobia and visual disturbance. Respiratory:  Negative for cough, shortness of breath and wheezing. Cardiovascular:  Negative for chest pain and palpitations. Gastrointestinal:  Negative for abdominal distention, diarrhea, nausea and vomiting. Genitourinary:  Negative for dysuria and hematuria. Musculoskeletal:  Positive for arthralgias and back pain. Negative for neck pain. Skin:  Negative for rash and wound. Neurological:  Negative for syncope and weakness. Psychiatric/Behavioral:  Negative for agitation and confusion. Exam  ED Triage Vitals [11/19/22 1119]   BP Temp Temp Source Heart Rate Resp SpO2 Height Weight   129/77 98.4 °F (36.9 °C) Oral 86 18 95 % -- --       Physical Exam  Vitals and nursing note reviewed. Constitutional:       General: She is not in acute distress. Appearance: She is well-developed. HENT:      Head: Normocephalic and atraumatic. Nose: Nose normal. No congestion. Eyes:      General: No scleral icterus. Extraocular Movements: Extraocular movements intact. Cardiovascular:      Rate and Rhythm: Normal rate and regular rhythm. Heart sounds: No murmur heard. Pulmonary:      Effort: Pulmonary effort is normal.      Breath sounds: Normal breath sounds. Abdominal:      General: There is no distension. Palpations: Abdomen is soft. Tenderness: There is no abdominal tenderness. There is no guarding or rebound. Musculoskeletal:         General: No deformity. Normal range of motion. Cervical back: Normal range of motion and neck supple. No rigidity or tenderness. Comments: There is no midline spinal tenderness no step-offs abrasions or objective evidence of trauma of the back.   There is fairly diffuse paraspinal tenderness on the left side from the upper lumbar up towards the rhomboid. Mild tenderness without deformity of the left shoulder. Decreased range of motion actively and passively secondary to pain at the shoulder as well as the elbow. No deformity. Neurovascular intact distally. Moving other joints other extremities normally. Ambulatory without difficulty. Skin:     General: Skin is warm. Findings: No rash. Neurological:      Mental Status: She is alert and oriented to person, place, and time. Motor: No abnormal muscle tone. Coordination: Coordination normal.   Psychiatric:         Mood and Affect: Mood normal.         Behavior: Behavior normal.         ED Course    ED Medication Orders (From admission, onward)      Start Ordered     Status Ordering Provider    11/19/22 1230 11/19/22 1200  lidocaine 4 % external patch 1 patch  ONCE         Last MAR action: Patch Applied - by Helen Belcher on 11/19/22 at [address]    11/19/22 1230 11/19/22 1200  methocarbamol (ROBAXIN) tablet 750 mg  ONCE         Last MAR action: Given - by Helen Belcher on 11/19/22 at [address], [address] [address]              Radiology  XR RIBS LEFT INCLUDE CHEST (MIN 3 VIEWS)    Result Date: 11/19/2022  EXAMINATION: 4 XRAY VIEWS OF THE LEFT RIBS WITH FRONTAL XRAY VIEW OF THE CHEST 11/19/2022 12:09 pm COMPARISON: None. HISTORY: ORDERING SYSTEM PROVIDED HISTORY: fall TECHNOLOGIST PROVIDED HISTORY: Reason for exam:->fall Reason for Exam: fell backwards on thursday, back and left sided rib pain, left arm pain FINDINGS: Chest: Heart size at the upper limits of normal.  No acute airspace disease. No pneumothorax. No pleural effusion. Left ribs: No fracture     No acute abnormalities seen in the chest or left ribs     XR PELVIS (1-2 VIEWS)    Result Date: 11/19/2022  EXAMINATION: ONE XRAY VIEW OF THE PELVIS 11/19/2022 12:09 pm COMPARISON: None.  HISTORY: ORDERING SYSTEM PROVIDED HISTORY: fall TECHNOLOGIST PROVIDED HISTORY: Reason for exam:->fall Reason for Exam: fell backwards on thursday, back and left sided rib pain, left arm pain FINDINGS: There is no evidence of acute fracture. There is normal alignment. No acute joint abnormality. No focal osseous lesion. No focal soft tissue abnormality. No acute osseous abnormality. XR ELBOW LEFT (MIN 3 VIEWS)    Result Date: 11/19/2022  EXAMINATION: THREE XRAY VIEWS OF THE LEFT ELBOW 11/19/2022 12:09 pm COMPARISON: None. HISTORY: ORDERING SYSTEM PROVIDED HISTORY: pain TECHNOLOGIST PROVIDED HISTORY: Reason for exam:->pain Reason for Exam: fell backwards on thursday, back and left sided rib pain, left arm pain Relevant Medical/Surgical History: best images due to pt's ability to cooperate FINDINGS: There is no evidence of acute fracture. There is normal alignment. No acute joint abnormality. No focal osseous lesion. No focal soft tissue abnormality. No acute osseous abnormality. XR SHOULDER LEFT (MIN 2 VIEWS)    Result Date: 11/19/2022  EXAMINATION: THREE XRAY VIEWS OF THE LEFT SHOULDER 11/19/2022 12:09 pm COMPARISON: None. HISTORY: ORDERING SYSTEM PROVIDED HISTORY: fall TECHNOLOGIST PROVIDED HISTORY: Reason for exam:->fall Reason for Exam: fell backwards on thursday, back and left sided rib pain, left arm pain Relevant Medical/Surgical History: best images due to pt's ability to cooperate FINDINGS: Glenohumeral joint is normally aligned. No evidence of acute fracture or dislocation. No abnormal periarticular calcifications. The [facility] joint is unremarkable in appearance. Visualized lung is unremarkable. No acute abnormality. Bedside Ultrasound  No results found. Labs  No results found for this visit on 11/19/22. Procedures  Procedures      MDM  Patient seen and evaluated. Relevant records reviewed. - Patient is 58 y.o. female presented for fall as noted above  - Exam showed as noted above. Overall reassuring no acute distress ambulatory vitals within normal limits.   Fairly diffuse tenderness all paraspinal and in the left upper extremity. - Workup here is overall reassuring. Plain films do not show any skeletal trauma. Did not see indication for head CT or spine CT. Abdomen soft and nontender and do not see indication for blood work which is convenient because the patient is also declining any needle sticks or labs. .  Kirsten Mac no gross hematuria  Kidney trauma unlikely. Discussed supportive care follow-up and return precautions. - Patient treated as noted above  - I have a low concern for  other emergent process, and do not see indication for further work-up in the ER, as it is unlikely  and poses more risk than benefit. - I discussed the results, including any incidental findings, with patient and counselor/therapist. Questions answered. We agreed to discharge PCP follow-up and return precautions. Patient/family agreeable to plan and express understanding of plan. Clinical Impression:  1. Thoracic myofascial strain, initial encounter    2. Contusion of left shoulder, initial encounter    3. Fall, initial encounter          Disposition:  Discharge to home in good condition. Blood pressure 129/77, pulse 86, temperature 98.4 °F (36.9 °C), temperature source Oral, resp. rate 18, SpO2 95 %, not currently breastfeeding. Patient was given scripts for the following medications. I counseled patient how to take these medications. New Prescriptions    DICLOFENAC SODIUM (VOLTAREN) 1 % GEL    Apply 2 g topically 4 times daily    LIDOCAINE (LIDODERM) 5 %    Place 1 patch onto the skin daily for 10 days 12 hours on, 12 hours off.     METHOCARBAMOL (ROBAXIN-750) 750 MG TABLET    Take 1 tablet by mouth 4 times daily for 10 days       Disposition referral (if applicable):  Sally Morrow [address] [address] [place]    Schedule an appointment as soon as possible for a visit   As needed    Tania BRUNER, am the primary attending of record and contributed the majority of evaluation and treatment of emergent care for this encounter. Total critical care time is 0 minutes, which excludes separately billable procedures and updating family. Time spent is specifically for management of the presenting complaint and symptoms initially, direct bedside care, reevaluation, review of records, and consultation. There was a high probability of clinically significant life-threatening deterioration in the patient's condition, which required my urgent intervention. This chart was generated in part by using Dragon Dictation system and may contain errors related to that system including errors in grammar, punctuation, and spelling, as well as words and phrases that may be inappropriate. If there are any questions or concerns please feel free to contact the dictating provider for clarification.      MD Koki Miner MD  11/19/22 0734

## 2022-11-21 ENCOUNTER — HOSPITAL ENCOUNTER (OUTPATIENT)
Dept: MAMMOGRAPHY | Age: 62
Discharge: HOME | End: 2022-11-21
Payer: MEDICARE

## 2022-11-21 DIAGNOSIS — Z12.39 SCREENING BREAST EXAMINATION: ICD-10-CM

## 2022-11-21 PROCEDURE — 77067 SCR MAMMO BI INCL CAD: CPT

## 2022-11-23 ENCOUNTER — TELEPHONE (OUTPATIENT)
Dept: MAMMOGRAPHY | Age: 62
End: 2022-11-23

## 2022-11-23 NOTE — TELEPHONE ENCOUNTER
Spoke with Aspen Casey regarding Jess's screening mammogram results. Results negative.  Radiologist recommends follow up in one year for a screening mammogram.

## 2023-02-14 ENCOUNTER — TELEPHONE (OUTPATIENT)
Dept: PULMONOLOGY | Age: 63
End: 2023-02-14

## 2023-02-14 DIAGNOSIS — J45.40 MODERATE PERSISTENT ASTHMA WITHOUT COMPLICATION: Primary | ICD-10-CM

## 2023-04-10 PROBLEM — R45.86 MOOD CHANGES: Status: ACTIVE | Noted: 2022-08-23

## 2023-04-10 PROBLEM — F41.1 GAD (GENERALIZED ANXIETY DISORDER): Status: ACTIVE | Noted: 2022-08-23

## 2023-04-19 ENCOUNTER — TELEPHONE (OUTPATIENT)
Dept: PULMONOLOGY | Age: 63
End: 2023-04-19

## 2023-04-19 NOTE — TELEPHONE ENCOUNTER
She is to where oxygen continuously at 2 L nc. Use albuterol 2 puffs four times a day x 2 days then 2 puffs four times a day as needed. If continued worsening symptoms despite treatment, she is to return to the ED for further evaluation. She was just in the ED for CHF, she may be retaining more fluid.

## 2023-04-19 NOTE — TELEPHONE ENCOUNTER
Elnora Fothergill called and is concerned about Jess's oxygen that drops to the 80's either walking and setting. She would like information what she can do for her. She states that Jess is miserable and she wants to know what she can do for her. Please advise.  Thanks

## 2023-05-18 ENCOUNTER — HOSPITAL ENCOUNTER (INPATIENT)
Age: 63
LOS: 8 days | Discharge: HOME | End: 2023-05-26
Attending: EMERGENCY MEDICINE | Admitting: INTERNAL MEDICINE
Payer: MEDICARE

## 2023-05-18 ENCOUNTER — APPOINTMENT (OUTPATIENT)
Dept: GENERAL RADIOLOGY | Age: 63
End: 2023-05-18
Payer: MEDICARE

## 2023-05-18 DIAGNOSIS — R06.09 EXERTIONAL DYSPNEA: Primary | ICD-10-CM

## 2023-05-18 DIAGNOSIS — R06.00 DYSPNEA AND RESPIRATORY ABNORMALITIES: ICD-10-CM

## 2023-05-18 DIAGNOSIS — R77.8 ELEVATED TROPONIN: ICD-10-CM

## 2023-05-18 DIAGNOSIS — I51.9 SYSTOLIC DYSFUNCTION, LEFT VENTRICLE: ICD-10-CM

## 2023-05-18 DIAGNOSIS — R79.89 ELEVATED BRAIN NATRIURETIC PEPTIDE (BNP) LEVEL: ICD-10-CM

## 2023-05-18 DIAGNOSIS — R06.89 DYSPNEA AND RESPIRATORY ABNORMALITIES: ICD-10-CM

## 2023-05-18 DIAGNOSIS — I50.9 CONGESTIVE HEART FAILURE, UNSPECIFIED HF CHRONICITY, UNSPECIFIED HEART FAILURE TYPE (HCC): ICD-10-CM

## 2023-05-18 PROBLEM — I50.21 ACUTE HFREF (HEART FAILURE WITH REDUCED EJECTION FRACTION) (HCC): Status: ACTIVE | Noted: 2023-05-18

## 2023-05-18 LAB
ALBUMIN SERPL-MCNC: 3.4 G/DL (ref 3.4–5)
ALBUMIN SERPL-MCNC: 3.5 G/DL (ref 3.4–5)
ALBUMIN/GLOB SERPL: 1.1 {RATIO} (ref 1.1–2.2)
ALP SERPL-CCNC: 134 U/L (ref 40–129)
ALP SERPL-CCNC: 149 U/L (ref 40–129)
ALT SERPL-CCNC: 319 U/L (ref 10–40)
ALT SERPL-CCNC: 349 U/L (ref 10–40)
AMORPH SED URNS QL MICRO: ABNORMAL /HPF
ANION GAP SERPL CALCULATED.3IONS-SCNC: 13 MMOL/L (ref 3–16)
AST SERPL-CCNC: 303 U/L (ref 15–37)
AST SERPL-CCNC: 318 U/L (ref 15–37)
BASE EXCESS BLDV CALC-SCNC: 0.4 MMOL/L (ref -3–3)
BASOPHILS # BLD: 0 K/UL (ref 0–0.2)
BASOPHILS NFR BLD: 0.3 %
BILIRUB DIRECT SERPL-MCNC: 0.3 MG/DL (ref 0–0.3)
BILIRUB INDIRECT SERPL-MCNC: 0.3 MG/DL (ref 0–1)
BILIRUB SERPL-MCNC: 0.6 MG/DL (ref 0–1)
BILIRUB SERPL-MCNC: 0.6 MG/DL (ref 0–1)
BILIRUB UR QL STRIP.AUTO: NEGATIVE
BUN SERPL-MCNC: 20 MG/DL (ref 7–20)
CALCIUM SERPL-MCNC: 8.8 MG/DL (ref 8.3–10.6)
CHLORIDE SERPL-SCNC: 93 MMOL/L (ref 99–110)
CLARITY UR: CLEAR
CO2 BLDV-SCNC: 26 MMOL/L
CO2 SERPL-SCNC: 26 MMOL/L (ref 21–32)
COHGB MFR BLDV: 4.9 % (ref 0–1.5)
COLOR UR: YELLOW
CREAT SERPL-MCNC: 1 MG/DL (ref 0.6–1.2)
DEPRECATED RDW RBC AUTO: 17.6 % (ref 12.4–15.4)
EKG ATRIAL RATE: 84 BPM
EKG DIAGNOSIS: NORMAL
EKG P AXIS: 70 DEGREES
EKG P-R INTERVAL: 174 MS
EKG Q-T INTERVAL: 418 MS
EKG QRS DURATION: 96 MS
EKG QTC CALCULATION (BAZETT): 493 MS
EKG R AXIS: 107 DEGREES
EKG T AXIS: 5 DEGREES
EKG VENTRICULAR RATE: 84 BPM
EOSINOPHIL # BLD: 0.1 K/UL (ref 0–0.6)
EOSINOPHIL NFR BLD: 0.7 %
EPI CELLS #/AREA URNS HPF: ABNORMAL /HPF (ref 0–5)
FLUAV RNA RESP QL NAA+PROBE: NOT DETECTED
FLUBV RNA RESP QL NAA+PROBE: NOT DETECTED
GFR SERPLBLD CREATININE-BSD FMLA CKD-EPI: >60 ML/MIN/{1.73_M2}
GLUCOSE SERPL-MCNC: 121 MG/DL (ref 70–99)
GLUCOSE UR STRIP.AUTO-MCNC: NEGATIVE MG/DL
HCO3 BLDV-SCNC: 24.8 MMOL/L (ref 23–29)
HCT VFR BLD AUTO: 29.8 % (ref 36–48)
HGB BLD-MCNC: 9.7 G/DL (ref 12–16)
HGB UR QL STRIP.AUTO: NEGATIVE
HYALINE CASTS #/AREA URNS LPF: ABNORMAL /LPF (ref 0–2)
KETONES UR STRIP.AUTO-MCNC: NEGATIVE MG/DL
LACTATE BLDV-SCNC: 3.4 MMOL/L (ref 0.4–2)
LEUKOCYTE ESTERASE UR QL STRIP.AUTO: ABNORMAL
LYMPHOCYTES # BLD: 1.5 K/UL (ref 1–5.1)
LYMPHOCYTES NFR BLD: 14.7 %
MCH RBC QN AUTO: 32.6 PG (ref 26–34)
MCHC RBC AUTO-ENTMCNC: 32.8 G/DL (ref 31–36)
MCV RBC AUTO: 99.5 FL (ref 80–100)
METHGB MFR BLDV: 0.5 %
MONOCYTES # BLD: 0.9 K/UL (ref 0–1.3)
MONOCYTES NFR BLD: 8.6 %
NEUTROPHILS # BLD: 7.8 K/UL (ref 1.7–7.7)
NEUTROPHILS NFR BLD: 75.7 %
NITRITE UR QL STRIP.AUTO: NEGATIVE
NT-PROBNP SERPL-MCNC: 3764 PG/ML (ref 0–124)
O2 THERAPY: ABNORMAL
PCO2 BLDV: 38.9 MMHG (ref 40–50)
PH BLDV: 7.42 [PH] (ref 7.35–7.45)
PH UR STRIP.AUTO: 6 [PH] (ref 5–8)
PLATELET # BLD AUTO: 230 K/UL (ref 135–450)
PMV BLD AUTO: 9.2 FL (ref 5–10.5)
PO2 BLDV: 68.2 MMHG (ref 25–40)
POTASSIUM SERPL-SCNC: 4.3 MMOL/L (ref 3.5–5.1)
PROCALCITONIN SERPL IA-MCNC: 0.1 NG/ML (ref 0–0.15)
PROT SERPL-MCNC: 6.6 G/DL (ref 6.4–8.2)
PROT SERPL-MCNC: 6.6 G/DL (ref 6.4–8.2)
PROT UR STRIP.AUTO-MCNC: NEGATIVE MG/DL
RBC # BLD AUTO: 2.99 M/UL (ref 4–5.2)
RBC #/AREA URNS HPF: ABNORMAL /HPF (ref 0–4)
REASON FOR REJECTION: NORMAL
REJECTED TEST: NORMAL
RENAL EPI CELLS #/AREA UR COMP ASSIST: ABNORMAL /HPF (ref 0–1)
SAO2 % BLDV: 92 %
SARS-COV-2 RNA RESP QL NAA+PROBE: NOT DETECTED
SODIUM SERPL-SCNC: 132 MMOL/L (ref 136–145)
SP GR UR STRIP.AUTO: 1.01 (ref 1–1.03)
TROPONIN, HIGH SENSITIVITY: 31 NG/L (ref 0–14)
TROPONIN, HIGH SENSITIVITY: 32 NG/L (ref 0–14)
TROPONIN, HIGH SENSITIVITY: 33 NG/L (ref 0–14)
UA COMPLETE W REFLEX CULTURE PNL UR: ABNORMAL
UA DIPSTICK W REFLEX MICRO PNL UR: YES
URN SPEC COLLECT METH UR: ABNORMAL
UROBILINOGEN UR STRIP-ACNC: 1 E.U./DL
WBC # BLD AUTO: 10.3 K/UL (ref 4–11)
WBC #/AREA URNS HPF: ABNORMAL /HPF (ref 0–5)

## 2023-05-18 PROCEDURE — 36415 COLL VENOUS BLD VENIPUNCTURE: CPT

## 2023-05-18 PROCEDURE — 83605 ASSAY OF LACTIC ACID: CPT

## 2023-05-18 PROCEDURE — 87636 SARSCOV2 & INF A&B AMP PRB: CPT

## 2023-05-18 PROCEDURE — 84439 ASSAY OF FREE THYROXINE: CPT

## 2023-05-18 PROCEDURE — 6360000002 HC RX W HCPCS: Performed by: INTERNAL MEDICINE

## 2023-05-18 PROCEDURE — 96374 THER/PROPH/DIAG INJ IV PUSH: CPT

## 2023-05-18 PROCEDURE — 83550 IRON BINDING TEST: CPT

## 2023-05-18 PROCEDURE — 6370000000 HC RX 637 (ALT 250 FOR IP): Performed by: INTERNAL MEDICINE

## 2023-05-18 PROCEDURE — 93010 ELECTROCARDIOGRAM REPORT: CPT | Performed by: INTERNAL MEDICINE

## 2023-05-18 PROCEDURE — 80053 COMPREHEN METABOLIC PANEL: CPT

## 2023-05-18 PROCEDURE — 84145 PROCALCITONIN (PCT): CPT

## 2023-05-18 PROCEDURE — 1200000000 HC SEMI PRIVATE

## 2023-05-18 PROCEDURE — 2700000000 HC OXYGEN THERAPY PER DAY

## 2023-05-18 PROCEDURE — 81001 URINALYSIS AUTO W/SCOPE: CPT

## 2023-05-18 PROCEDURE — 84443 ASSAY THYROID STIM HORMONE: CPT

## 2023-05-18 PROCEDURE — 94761 N-INVAS EAR/PLS OXIMETRY MLT: CPT

## 2023-05-18 PROCEDURE — 99285 EMERGENCY DEPT VISIT HI MDM: CPT

## 2023-05-18 PROCEDURE — 71045 X-RAY EXAM CHEST 1 VIEW: CPT

## 2023-05-18 PROCEDURE — 6360000002 HC RX W HCPCS: Performed by: EMERGENCY MEDICINE

## 2023-05-18 PROCEDURE — 83880 ASSAY OF NATRIURETIC PEPTIDE: CPT

## 2023-05-18 PROCEDURE — 85025 COMPLETE CBC W/AUTO DIFF WBC: CPT

## 2023-05-18 PROCEDURE — 93005 ELECTROCARDIOGRAM TRACING: CPT | Performed by: EMERGENCY MEDICINE

## 2023-05-18 PROCEDURE — 82803 BLOOD GASES ANY COMBINATION: CPT

## 2023-05-18 PROCEDURE — 83036 HEMOGLOBIN GLYCOSYLATED A1C: CPT

## 2023-05-18 PROCEDURE — 83540 ASSAY OF IRON: CPT

## 2023-05-18 PROCEDURE — 2580000003 HC RX 258: Performed by: INTERNAL MEDICINE

## 2023-05-18 PROCEDURE — 84484 ASSAY OF TROPONIN QUANT: CPT

## 2023-05-18 RX ORDER — ENOXAPARIN SODIUM 100 MG/ML
40 INJECTION SUBCUTANEOUS DAILY
Status: DISCONTINUED | OUTPATIENT
Start: 2023-05-19 | End: 2023-05-26 | Stop reason: HOSPADM

## 2023-05-18 RX ORDER — METOPROLOL SUCCINATE 25 MG/1
25 TABLET, EXTENDED RELEASE ORAL DAILY
Status: DISCONTINUED | OUTPATIENT
Start: 2023-05-18 | End: 2023-05-19

## 2023-05-18 RX ORDER — MONTELUKAST SODIUM 10 MG/1
TABLET ORAL
Qty: 31 TABLET | Refills: 10 | Status: ON HOLD | OUTPATIENT
Start: 2023-05-18

## 2023-05-18 RX ORDER — ACETAMINOPHEN 650 MG/1
650 SUPPOSITORY RECTAL EVERY 6 HOURS PRN
Status: DISCONTINUED | OUTPATIENT
Start: 2023-05-18 | End: 2023-05-26 | Stop reason: HOSPADM

## 2023-05-18 RX ORDER — BUPROPION HYDROCHLORIDE 100 MG/1
200 TABLET, EXTENDED RELEASE ORAL DAILY
Status: DISCONTINUED | OUTPATIENT
Start: 2023-05-19 | End: 2023-05-26 | Stop reason: HOSPADM

## 2023-05-18 RX ORDER — PANTOPRAZOLE SODIUM 40 MG/1
40 TABLET, DELAYED RELEASE ORAL
Status: DISCONTINUED | OUTPATIENT
Start: 2023-05-19 | End: 2023-05-26 | Stop reason: HOSPADM

## 2023-05-18 RX ORDER — LANOLIN ALCOHOL/MO/W.PET/CERES
400 CREAM (GRAM) TOPICAL DAILY
Status: DISCONTINUED | OUTPATIENT
Start: 2023-05-18 | End: 2023-05-26 | Stop reason: HOSPADM

## 2023-05-18 RX ORDER — POTASSIUM CHLORIDE 7.45 MG/ML
10 INJECTION INTRAVENOUS PRN
Status: DISCONTINUED | OUTPATIENT
Start: 2023-05-18 | End: 2023-05-21

## 2023-05-18 RX ORDER — M-VIT,TX,IRON,MINS/CALC/FOLIC 27MG-0.4MG
1 TABLET ORAL DAILY
Status: DISCONTINUED | OUTPATIENT
Start: 2023-05-18 | End: 2023-05-26 | Stop reason: HOSPADM

## 2023-05-18 RX ORDER — ONDANSETRON 4 MG/1
4 TABLET, ORALLY DISINTEGRATING ORAL EVERY 8 HOURS PRN
Status: DISCONTINUED | OUTPATIENT
Start: 2023-05-18 | End: 2023-05-26 | Stop reason: HOSPADM

## 2023-05-18 RX ORDER — ALBUTEROL SULFATE 90 UG/1
1 AEROSOL, METERED RESPIRATORY (INHALATION) 2 TIMES DAILY
Status: DISCONTINUED | OUTPATIENT
Start: 2023-05-19 | End: 2023-05-26 | Stop reason: HOSPADM

## 2023-05-18 RX ORDER — FUROSEMIDE 10 MG/ML
20 INJECTION INTRAMUSCULAR; INTRAVENOUS 2 TIMES DAILY
Status: DISCONTINUED | OUTPATIENT
Start: 2023-05-18 | End: 2023-05-18

## 2023-05-18 RX ORDER — POLYETHYLENE GLYCOL 3350 17 G/17G
17 POWDER, FOR SOLUTION ORAL DAILY PRN
Status: DISCONTINUED | OUTPATIENT
Start: 2023-05-18 | End: 2023-05-26 | Stop reason: HOSPADM

## 2023-05-18 RX ORDER — ATORVASTATIN CALCIUM 10 MG/1
10 TABLET, FILM COATED ORAL DAILY
Status: DISCONTINUED | OUTPATIENT
Start: 2023-05-18 | End: 2023-05-26 | Stop reason: HOSPADM

## 2023-05-18 RX ORDER — ACETAMINOPHEN 325 MG/1
650 TABLET ORAL EVERY 6 HOURS PRN
Status: DISCONTINUED | OUTPATIENT
Start: 2023-05-18 | End: 2023-05-26 | Stop reason: HOSPADM

## 2023-05-18 RX ORDER — CLOMIPRAMINE HYDROCHLORIDE 25 MG/1
150 CAPSULE ORAL NIGHTLY
Status: DISCONTINUED | OUTPATIENT
Start: 2023-05-18 | End: 2023-05-26 | Stop reason: HOSPADM

## 2023-05-18 RX ORDER — ALBUTEROL SULFATE 90 UG/1
1 AEROSOL, METERED RESPIRATORY (INHALATION) EVERY 6 HOURS PRN
Status: DISCONTINUED | OUTPATIENT
Start: 2023-05-18 | End: 2023-05-18

## 2023-05-18 RX ORDER — INSULIN LISPRO 100 [IU]/ML
0-4 INJECTION, SOLUTION INTRAVENOUS; SUBCUTANEOUS
Status: DISCONTINUED | OUTPATIENT
Start: 2023-05-19 | End: 2023-05-26 | Stop reason: HOSPADM

## 2023-05-18 RX ORDER — LEVETIRACETAM 500 MG/1
500 TABLET ORAL 2 TIMES DAILY
Status: DISCONTINUED | OUTPATIENT
Start: 2023-05-18 | End: 2023-05-26 | Stop reason: HOSPADM

## 2023-05-18 RX ORDER — VITAMIN B COMPLEX
1000 TABLET ORAL DAILY
Status: DISCONTINUED | OUTPATIENT
Start: 2023-05-18 | End: 2023-05-26 | Stop reason: HOSPADM

## 2023-05-18 RX ORDER — POTASSIUM CHLORIDE 20 MEQ/1
40 TABLET, EXTENDED RELEASE ORAL PRN
Status: DISCONTINUED | OUTPATIENT
Start: 2023-05-18 | End: 2023-05-21

## 2023-05-18 RX ORDER — DEXTROSE MONOHYDRATE 100 MG/ML
INJECTION, SOLUTION INTRAVENOUS CONTINUOUS PRN
Status: DISCONTINUED | OUTPATIENT
Start: 2023-05-18 | End: 2023-05-26 | Stop reason: HOSPADM

## 2023-05-18 RX ORDER — MONTELUKAST SODIUM 10 MG/1
10 TABLET ORAL NIGHTLY
Status: DISCONTINUED | OUTPATIENT
Start: 2023-05-18 | End: 2023-05-26 | Stop reason: HOSPADM

## 2023-05-18 RX ORDER — SODIUM CHLORIDE 0.9 % (FLUSH) 0.9 %
5-40 SYRINGE (ML) INJECTION EVERY 12 HOURS SCHEDULED
Status: DISCONTINUED | OUTPATIENT
Start: 2023-05-18 | End: 2023-05-26 | Stop reason: HOSPADM

## 2023-05-18 RX ORDER — FUROSEMIDE 10 MG/ML
40 INJECTION INTRAMUSCULAR; INTRAVENOUS ONCE
Status: COMPLETED | OUTPATIENT
Start: 2023-05-18 | End: 2023-05-18

## 2023-05-18 RX ORDER — FLUTICASONE PROPIONATE 50 MCG
2 SPRAY, SUSPENSION (ML) NASAL DAILY
Status: DISCONTINUED | OUTPATIENT
Start: 2023-05-18 | End: 2023-05-26 | Stop reason: HOSPADM

## 2023-05-18 RX ORDER — BACITRACIN ZINC AND POLYMYXIN B SULFATE 500; 1000 [USP'U]/G; [USP'U]/G
OINTMENT TOPICAL 2 TIMES DAILY
COMMUNITY

## 2023-05-18 RX ORDER — FUROSEMIDE 10 MG/ML
20 INJECTION INTRAMUSCULAR; INTRAVENOUS 2 TIMES DAILY
Status: DISCONTINUED | OUTPATIENT
Start: 2023-05-18 | End: 2023-05-20

## 2023-05-18 RX ORDER — SODIUM CHLORIDE 0.9 % (FLUSH) 0.9 %
5-40 SYRINGE (ML) INJECTION PRN
Status: DISCONTINUED | OUTPATIENT
Start: 2023-05-18 | End: 2023-05-26 | Stop reason: HOSPADM

## 2023-05-18 RX ORDER — ONDANSETRON 2 MG/ML
4 INJECTION INTRAMUSCULAR; INTRAVENOUS EVERY 6 HOURS PRN
Status: DISCONTINUED | OUTPATIENT
Start: 2023-05-18 | End: 2023-05-26 | Stop reason: HOSPADM

## 2023-05-18 RX ORDER — INSULIN LISPRO 100 [IU]/ML
0-4 INJECTION, SOLUTION INTRAVENOUS; SUBCUTANEOUS NIGHTLY
Status: DISCONTINUED | OUTPATIENT
Start: 2023-05-18 | End: 2023-05-26 | Stop reason: HOSPADM

## 2023-05-18 RX ORDER — DIPHENHYDRAMINE HCL 25 MG
25 CAPSULE ORAL EVERY 6 HOURS PRN
COMMUNITY

## 2023-05-18 RX ORDER — BUDESONIDE AND FORMOTEROL FUMARATE DIHYDRATE 160; 4.5 UG/1; UG/1
1 AEROSOL RESPIRATORY (INHALATION) 2 TIMES DAILY
Status: DISCONTINUED | OUTPATIENT
Start: 2023-05-18 | End: 2023-05-18 | Stop reason: CLARIF

## 2023-05-18 RX ORDER — SODIUM CHLORIDE 9 MG/ML
INJECTION, SOLUTION INTRAVENOUS PRN
Status: DISCONTINUED | OUTPATIENT
Start: 2023-05-18 | End: 2023-05-19 | Stop reason: SDUPTHER

## 2023-05-18 RX ORDER — DIPHENHYDRAMINE HCL 25 MG
25 TABLET ORAL EVERY 6 HOURS PRN
Status: DISCONTINUED | OUTPATIENT
Start: 2023-05-18 | End: 2023-05-26 | Stop reason: HOSPADM

## 2023-05-18 RX ORDER — ACETAMINOPHEN 500 MG
1000 TABLET ORAL EVERY 6 HOURS PRN
COMMUNITY

## 2023-05-18 RX ORDER — METOCLOPRAMIDE 10 MG/1
5 TABLET ORAL
Status: DISCONTINUED | OUTPATIENT
Start: 2023-05-18 | End: 2023-05-26 | Stop reason: HOSPADM

## 2023-05-18 RX ORDER — ARIPIPRAZOLE 10 MG/1
15 TABLET ORAL NIGHTLY
Status: DISCONTINUED | OUTPATIENT
Start: 2023-05-18 | End: 2023-05-26 | Stop reason: HOSPADM

## 2023-05-18 RX ORDER — MAGNESIUM SULFATE IN WATER 40 MG/ML
2000 INJECTION, SOLUTION INTRAVENOUS PRN
Status: DISCONTINUED | OUTPATIENT
Start: 2023-05-18 | End: 2023-05-26 | Stop reason: HOSPADM

## 2023-05-18 RX ORDER — CETIRIZINE HYDROCHLORIDE 10 MG/1
10 TABLET ORAL DAILY
Status: DISCONTINUED | OUTPATIENT
Start: 2023-05-18 | End: 2023-05-26 | Stop reason: HOSPADM

## 2023-05-18 RX ADMIN — ARIPIPRAZOLE 15 MG: 10 TABLET ORAL at 22:04

## 2023-05-18 RX ADMIN — FUROSEMIDE 40 MG: 10 INJECTION, SOLUTION INTRAMUSCULAR; INTRAVENOUS at 15:47

## 2023-05-18 RX ADMIN — Medication 10 ML: at 22:22

## 2023-05-18 RX ADMIN — LEVETIRACETAM 500 MG: 500 TABLET, FILM COATED ORAL at 22:04

## 2023-05-18 RX ADMIN — MONTELUKAST 10 MG: 10 TABLET, FILM COATED ORAL at 22:04

## 2023-05-18 RX ADMIN — FUROSEMIDE 20 MG: 10 INJECTION, SOLUTION INTRAMUSCULAR; INTRAVENOUS at 22:19

## 2023-05-18 RX ADMIN — METOCLOPRAMIDE 5 MG: 10 TABLET ORAL at 22:04

## 2023-05-18 ASSESSMENT — PAIN - FUNCTIONAL ASSESSMENT: PAIN_FUNCTIONAL_ASSESSMENT: NONE - DENIES PAIN

## 2023-05-18 ASSESSMENT — LIFESTYLE VARIABLES: HOW OFTEN DO YOU HAVE A DRINK CONTAINING ALCOHOL: NEVER

## 2023-05-18 NOTE — TELEPHONE ENCOUNTER
Last office visit 4/10/2023     Last written 5/16/2022     Next office visit scheduled 10/12/2023    Requested Prescriptions     Pending Prescriptions Disp Refills    montelukast (SINGULAIR) 10 MG tablet [Pharmacy Med Name: MONTELUKAST 10MG] 31 tablet 10     Sig: TAKE 1 TABLET BY MOUTH AT BEDTIME.

## 2023-05-19 ENCOUNTER — APPOINTMENT (OUTPATIENT)
Dept: CARDIAC CATH/INVASIVE PROCEDURES | Age: 63
End: 2023-05-19
Payer: MEDICARE

## 2023-05-19 LAB
ANION GAP SERPL CALCULATED.3IONS-SCNC: 15 MMOL/L (ref 3–16)
BUN SERPL-MCNC: 18 MG/DL (ref 7–20)
CALCIUM SERPL-MCNC: 8.5 MG/DL (ref 8.3–10.6)
CHLORIDE SERPL-SCNC: 97 MMOL/L (ref 99–110)
CHOLEST SERPL-MCNC: 76 MG/DL (ref 0–199)
CO2 SERPL-SCNC: 24 MMOL/L (ref 21–32)
CREAT SERPL-MCNC: 1 MG/DL (ref 0.6–1.2)
DEPRECATED RDW RBC AUTO: 18.6 % (ref 12.4–15.4)
EKG ATRIAL RATE: 83 BPM
EKG DIAGNOSIS: NORMAL
EKG P AXIS: 91 DEGREES
EKG P-R INTERVAL: 172 MS
EKG Q-T INTERVAL: 396 MS
EKG QRS DURATION: 102 MS
EKG QTC CALCULATION (BAZETT): 495 MS
EKG R AXIS: -11 DEGREES
EKG T AXIS: 224 DEGREES
EKG VENTRICULAR RATE: 94 BPM
EST. AVERAGE GLUCOSE BLD GHB EST-MCNC: 111.2 MG/DL
GFR SERPLBLD CREATININE-BSD FMLA CKD-EPI: >60 ML/MIN/{1.73_M2}
GLUCOSE BLD-MCNC: 124 MG/DL (ref 70–99)
GLUCOSE BLD-MCNC: 84 MG/DL (ref 70–99)
GLUCOSE BLD-MCNC: 99 MG/DL (ref 70–99)
GLUCOSE SERPL-MCNC: 118 MG/DL (ref 70–99)
HBA1C MFR BLD: 5.5 %
HCT VFR BLD AUTO: 28.9 % (ref 36–48)
HDLC SERPL-MCNC: 25 MG/DL (ref 40–60)
HGB BLD-MCNC: 9.1 G/DL (ref 12–16)
IRON SATN MFR SERPL: 10 % (ref 15–50)
IRON SERPL-MCNC: 33 UG/DL (ref 37–145)
LDLC SERPL CALC-MCNC: 38 MG/DL
LV EF: 38 %
LVEF MODALITY: NORMAL
MAGNESIUM SERPL-MCNC: 2.3 MG/DL (ref 1.8–2.4)
MCH RBC QN AUTO: 31.9 PG (ref 26–34)
MCHC RBC AUTO-ENTMCNC: 31.6 G/DL (ref 31–36)
MCV RBC AUTO: 101.2 FL (ref 80–100)
PERFORMED ON: ABNORMAL
PERFORMED ON: NORMAL
PERFORMED ON: NORMAL
PLATELET # BLD AUTO: 156 K/UL (ref 135–450)
PMV BLD AUTO: 10.2 FL (ref 5–10.5)
POTASSIUM SERPL-SCNC: 3.8 MMOL/L (ref 3.5–5.1)
RBC # BLD AUTO: 2.86 M/UL (ref 4–5.2)
SODIUM SERPL-SCNC: 136 MMOL/L (ref 136–145)
T4 FREE SERPL-MCNC: 1.4 NG/DL (ref 0.9–1.8)
TIBC SERPL-MCNC: 328 UG/DL (ref 260–445)
TRIGL SERPL-MCNC: 64 MG/DL (ref 0–150)
TROPONIN, HIGH SENSITIVITY: 32 NG/L (ref 0–14)
TSH SERPL DL<=0.005 MIU/L-ACNC: 2.2 UIU/ML (ref 0.27–4.2)
VLDLC SERPL CALC-MCNC: 13 MG/DL
WBC # BLD AUTO: 9.5 K/UL (ref 4–11)

## 2023-05-19 PROCEDURE — 93460 R&L HRT ART/VENTRICLE ANGIO: CPT

## 2023-05-19 PROCEDURE — C1769 GUIDE WIRE: HCPCS

## 2023-05-19 PROCEDURE — 94761 N-INVAS EAR/PLS OXIMETRY MLT: CPT

## 2023-05-19 PROCEDURE — 6360000004 HC RX CONTRAST MEDICATION: Performed by: INTERNAL MEDICINE

## 2023-05-19 PROCEDURE — 6360000002 HC RX W HCPCS

## 2023-05-19 PROCEDURE — C1894 INTRO/SHEATH, NON-LASER: HCPCS

## 2023-05-19 PROCEDURE — 4A023N7 MEASUREMENT OF CARDIAC SAMPLING AND PRESSURE, LEFT HEART, PERCUTANEOUS APPROACH: ICD-10-PCS | Performed by: INTERNAL MEDICINE

## 2023-05-19 PROCEDURE — 6360000002 HC RX W HCPCS: Performed by: INTERNAL MEDICINE

## 2023-05-19 PROCEDURE — 93010 ELECTROCARDIOGRAM REPORT: CPT | Performed by: INTERNAL MEDICINE

## 2023-05-19 PROCEDURE — C8929 TTE W OR WO FOL WCON,DOPPLER: HCPCS

## 2023-05-19 PROCEDURE — 93005 ELECTROCARDIOGRAM TRACING: CPT

## 2023-05-19 PROCEDURE — B2111ZZ FLUOROSCOPY OF MULTIPLE CORONARY ARTERIES USING LOW OSMOLAR CONTRAST: ICD-10-PCS | Performed by: INTERNAL MEDICINE

## 2023-05-19 PROCEDURE — B2151ZZ FLUOROSCOPY OF LEFT HEART USING LOW OSMOLAR CONTRAST: ICD-10-PCS | Performed by: INTERNAL MEDICINE

## 2023-05-19 PROCEDURE — 2580000003 HC RX 258: Performed by: INTERNAL MEDICINE

## 2023-05-19 PROCEDURE — 2709999900 HC NON-CHARGEABLE SUPPLY

## 2023-05-19 PROCEDURE — 6370000000 HC RX 637 (ALT 250 FOR IP): Performed by: INTERNAL MEDICINE

## 2023-05-19 PROCEDURE — 2700000000 HC OXYGEN THERAPY PER DAY

## 2023-05-19 PROCEDURE — 2060000000 HC ICU INTERMEDIATE R&B

## 2023-05-19 PROCEDURE — 83735 ASSAY OF MAGNESIUM: CPT

## 2023-05-19 PROCEDURE — 80061 LIPID PANEL: CPT

## 2023-05-19 PROCEDURE — 80048 BASIC METABOLIC PNL TOTAL CA: CPT

## 2023-05-19 PROCEDURE — 94640 AIRWAY INHALATION TREATMENT: CPT

## 2023-05-19 PROCEDURE — 2500000003 HC RX 250 WO HCPCS

## 2023-05-19 PROCEDURE — 99222 1ST HOSP IP/OBS MODERATE 55: CPT | Performed by: INTERNAL MEDICINE

## 2023-05-19 PROCEDURE — 85027 COMPLETE CBC AUTOMATED: CPT

## 2023-05-19 PROCEDURE — C1887 CATHETER, GUIDING: HCPCS

## 2023-05-19 RX ORDER — SODIUM CHLORIDE 0.9 % (FLUSH) 0.9 %
5-40 SYRINGE (ML) INJECTION EVERY 12 HOURS SCHEDULED
Status: DISCONTINUED | OUTPATIENT
Start: 2023-05-19 | End: 2023-05-19 | Stop reason: SDUPTHER

## 2023-05-19 RX ORDER — FUROSEMIDE 10 MG/ML
40 INJECTION INTRAMUSCULAR; INTRAVENOUS ONCE
Status: COMPLETED | OUTPATIENT
Start: 2023-05-19 | End: 2023-05-19

## 2023-05-19 RX ORDER — FENTANYL CITRATE 50 UG/ML
INJECTION, SOLUTION INTRAMUSCULAR; INTRAVENOUS
Status: COMPLETED | OUTPATIENT
Start: 2023-05-19 | End: 2023-05-19

## 2023-05-19 RX ORDER — VALSARTAN 80 MG/1
40 TABLET ORAL DAILY
Status: DISCONTINUED | OUTPATIENT
Start: 2023-05-19 | End: 2023-05-26 | Stop reason: HOSPADM

## 2023-05-19 RX ORDER — DRONABINOL 5 MG/1
5 CAPSULE ORAL 2 TIMES DAILY
Status: DISCONTINUED | OUTPATIENT
Start: 2023-05-19 | End: 2023-05-26 | Stop reason: HOSPADM

## 2023-05-19 RX ORDER — SODIUM CHLORIDE 9 MG/ML
INJECTION, SOLUTION INTRAVENOUS PRN
Status: DISCONTINUED | OUTPATIENT
Start: 2023-05-19 | End: 2023-05-26 | Stop reason: HOSPADM

## 2023-05-19 RX ORDER — MIDAZOLAM HYDROCHLORIDE 1 MG/ML
INJECTION INTRAMUSCULAR; INTRAVENOUS
Status: COMPLETED | OUTPATIENT
Start: 2023-05-19 | End: 2023-05-19

## 2023-05-19 RX ORDER — ASPIRIN 325 MG
325 TABLET ORAL ONCE
Status: COMPLETED | OUTPATIENT
Start: 2023-05-19 | End: 2023-05-19

## 2023-05-19 RX ORDER — LORAZEPAM 0.5 MG/1
0.5 TABLET ORAL
Status: COMPLETED | OUTPATIENT
Start: 2023-05-19 | End: 2023-05-19

## 2023-05-19 RX ORDER — ACETAMINOPHEN 325 MG/1
650 TABLET ORAL EVERY 4 HOURS PRN
Status: DISCONTINUED | OUTPATIENT
Start: 2023-05-19 | End: 2023-05-22

## 2023-05-19 RX ORDER — SODIUM CHLORIDE 0.9 % (FLUSH) 0.9 %
5-40 SYRINGE (ML) INJECTION EVERY 12 HOURS SCHEDULED
Status: DISCONTINUED | OUTPATIENT
Start: 2023-05-19 | End: 2023-05-25

## 2023-05-19 RX ORDER — SODIUM CHLORIDE 0.9 % (FLUSH) 0.9 %
5-40 SYRINGE (ML) INJECTION PRN
Status: DISCONTINUED | OUTPATIENT
Start: 2023-05-19 | End: 2023-05-26 | Stop reason: HOSPADM

## 2023-05-19 RX ORDER — HEPARIN SODIUM 1000 [USP'U]/ML
INJECTION, SOLUTION INTRAVENOUS; SUBCUTANEOUS
Status: COMPLETED | OUTPATIENT
Start: 2023-05-19 | End: 2023-05-19

## 2023-05-19 RX ORDER — SODIUM CHLORIDE 0.9 % (FLUSH) 0.9 %
5-40 SYRINGE (ML) INJECTION PRN
Status: DISCONTINUED | OUTPATIENT
Start: 2023-05-19 | End: 2023-05-19 | Stop reason: SDUPTHER

## 2023-05-19 RX ORDER — ONDANSETRON 2 MG/ML
4 INJECTION INTRAMUSCULAR; INTRAVENOUS EVERY 6 HOURS PRN
Status: DISCONTINUED | OUTPATIENT
Start: 2023-05-19 | End: 2023-05-19 | Stop reason: SDUPTHER

## 2023-05-19 RX ADMIN — METOPROLOL SUCCINATE 25 MG: 25 TABLET, EXTENDED RELEASE ORAL at 10:05

## 2023-05-19 RX ADMIN — PANTOPRAZOLE SODIUM 40 MG: 40 TABLET, DELAYED RELEASE ORAL at 10:05

## 2023-05-19 RX ADMIN — Medication 1 PUFF: at 08:04

## 2023-05-19 RX ADMIN — ATORVASTATIN CALCIUM 10 MG: 10 TABLET, FILM COATED ORAL at 10:06

## 2023-05-19 RX ADMIN — HEPARIN SODIUM 4000 UNITS: 1000 INJECTION, SOLUTION INTRAVENOUS; SUBCUTANEOUS at 16:30

## 2023-05-19 RX ADMIN — METOCLOPRAMIDE 5 MG: 10 TABLET ORAL at 10:06

## 2023-05-19 RX ADMIN — FUROSEMIDE 20 MG: 10 INJECTION, SOLUTION INTRAMUSCULAR; INTRAVENOUS at 10:08

## 2023-05-19 RX ADMIN — SODIUM CHLORIDE, PRESERVATIVE FREE 10 ML: 5 INJECTION INTRAVENOUS at 21:15

## 2023-05-19 RX ADMIN — METOCLOPRAMIDE 5 MG: 10 TABLET ORAL at 20:51

## 2023-05-19 RX ADMIN — ENOXAPARIN SODIUM 40 MG: 100 INJECTION SUBCUTANEOUS at 10:07

## 2023-05-19 RX ADMIN — VALSARTAN 40 MG: 80 TABLET, FILM COATED ORAL at 14:12

## 2023-05-19 RX ADMIN — LORAZEPAM 0.5 MG: 0.5 TABLET ORAL at 14:12

## 2023-05-19 RX ADMIN — CETIRIZINE HYDROCHLORIDE 10 MG: 10 TABLET, FILM COATED ORAL at 10:06

## 2023-05-19 RX ADMIN — BUPROPION HYDROCHLORIDE 200 MG: 100 TABLET, EXTENDED RELEASE ORAL at 10:14

## 2023-05-19 RX ADMIN — LEVETIRACETAM 500 MG: 500 TABLET, FILM COATED ORAL at 20:51

## 2023-05-19 RX ADMIN — MIDAZOLAM HYDROCHLORIDE 0.5 MG: 1 INJECTION INTRAMUSCULAR; INTRAVENOUS at 16:12

## 2023-05-19 RX ADMIN — MONTELUKAST 10 MG: 10 TABLET, FILM COATED ORAL at 20:51

## 2023-05-19 RX ADMIN — DRONABINOL 5 MG: 5 CAPSULE ORAL at 20:51

## 2023-05-19 RX ADMIN — MULTIPLE VITAMINS W/ MINERALS TAB 1 TABLET: TAB at 10:05

## 2023-05-19 RX ADMIN — Medication 1000 UNITS: at 10:05

## 2023-05-19 RX ADMIN — LEVETIRACETAM 500 MG: 500 TABLET, FILM COATED ORAL at 10:05

## 2023-05-19 RX ADMIN — FENTANYL CITRATE 12.5 MCG: 50 INJECTION, SOLUTION INTRAMUSCULAR; INTRAVENOUS at 16:12

## 2023-05-19 RX ADMIN — ARIPIPRAZOLE 15 MG: 10 TABLET ORAL at 20:50

## 2023-05-19 RX ADMIN — FUROSEMIDE 20 MG: 10 INJECTION, SOLUTION INTRAMUSCULAR; INTRAVENOUS at 18:53

## 2023-05-19 RX ADMIN — ASPIRIN 325 MG: 325 TABLET ORAL at 14:12

## 2023-05-19 RX ADMIN — PERFLUTREN 1.5 ML: 6.52 INJECTION, SUSPENSION INTRAVENOUS at 11:30

## 2023-05-19 RX ADMIN — FUROSEMIDE 40 MG: 10 INJECTION, SOLUTION INTRAMUSCULAR; INTRAVENOUS at 22:22

## 2023-05-19 RX ADMIN — DICLOFENAC SODIUM 2 G: 10 GEL TOPICAL at 20:54

## 2023-05-19 RX ADMIN — Medication 2 PUFF: at 08:04

## 2023-05-19 ASSESSMENT — PAIN SCALES - GENERAL: PAINLEVEL_OUTOF10: 2

## 2023-05-19 ASSESSMENT — PAIN SCALES - WONG BAKER: WONGBAKER_NUMERICALRESPONSE: 2

## 2023-05-20 LAB
ALBUMIN SERPL-MCNC: 3.1 G/DL (ref 3.4–5)
ALP SERPL-CCNC: 130 U/L (ref 40–129)
ALT SERPL-CCNC: 248 U/L (ref 10–40)
ANION GAP SERPL CALCULATED.3IONS-SCNC: 12 MMOL/L (ref 3–16)
AST SERPL-CCNC: 145 U/L (ref 15–37)
BASOPHILS # BLD: 0 K/UL (ref 0–0.2)
BASOPHILS NFR BLD: 0.3 %
BILIRUB DIRECT SERPL-MCNC: 0.8 MG/DL (ref 0–0.3)
BILIRUB INDIRECT SERPL-MCNC: 0.7 MG/DL (ref 0–1)
BILIRUB SERPL-MCNC: 1.5 MG/DL (ref 0–1)
BUN SERPL-MCNC: 15 MG/DL (ref 7–20)
CALCIUM SERPL-MCNC: 8.7 MG/DL (ref 8.3–10.6)
CHLORIDE SERPL-SCNC: 99 MMOL/L (ref 99–110)
CO2 SERPL-SCNC: 30 MMOL/L (ref 21–32)
CREAT SERPL-MCNC: 1.1 MG/DL (ref 0.6–1.2)
DEPRECATED RDW RBC AUTO: 17.7 % (ref 12.4–15.4)
EOSINOPHIL # BLD: 0.2 K/UL (ref 0–0.6)
EOSINOPHIL NFR BLD: 1.7 %
GFR SERPLBLD CREATININE-BSD FMLA CKD-EPI: 56 ML/MIN/{1.73_M2}
GLUCOSE BLD-MCNC: 106 MG/DL (ref 70–99)
GLUCOSE BLD-MCNC: 122 MG/DL (ref 70–99)
GLUCOSE BLD-MCNC: 144 MG/DL (ref 70–99)
GLUCOSE BLD-MCNC: 148 MG/DL (ref 70–99)
GLUCOSE SERPL-MCNC: 103 MG/DL (ref 70–99)
HAV IGM SERPL QL IA: NORMAL
HBV CORE IGM SERPL QL IA: NORMAL
HBV SURFACE AG SERPL QL IA: NORMAL
HCT VFR BLD AUTO: 32.6 % (ref 36–48)
HCV AB SERPL QL IA: NORMAL
HGB BLD-MCNC: 10.5 G/DL (ref 12–16)
LYMPHOCYTES # BLD: 0.7 K/UL (ref 1–5.1)
LYMPHOCYTES NFR BLD: 7.7 %
MAGNESIUM SERPL-MCNC: 2.2 MG/DL (ref 1.8–2.4)
MCH RBC QN AUTO: 32.1 PG (ref 26–34)
MCHC RBC AUTO-ENTMCNC: 32.2 G/DL (ref 31–36)
MCV RBC AUTO: 99.6 FL (ref 80–100)
MONOCYTES # BLD: 0.6 K/UL (ref 0–1.3)
MONOCYTES NFR BLD: 7 %
NEUTROPHILS # BLD: 7.5 K/UL (ref 1.7–7.7)
NEUTROPHILS NFR BLD: 83.3 %
PERFORMED ON: ABNORMAL
PLATELET # BLD AUTO: 196 K/UL (ref 135–450)
PMV BLD AUTO: 8.6 FL (ref 5–10.5)
POTASSIUM SERPL-SCNC: 3.7 MMOL/L (ref 3.5–5.1)
PROT SERPL-MCNC: 6.2 G/DL (ref 6.4–8.2)
RBC # BLD AUTO: 3.27 M/UL (ref 4–5.2)
SODIUM SERPL-SCNC: 141 MMOL/L (ref 136–145)
WBC # BLD AUTO: 9.1 K/UL (ref 4–11)

## 2023-05-20 PROCEDURE — 94640 AIRWAY INHALATION TREATMENT: CPT

## 2023-05-20 PROCEDURE — 99233 SBSQ HOSP IP/OBS HIGH 50: CPT | Performed by: NURSE PRACTITIONER

## 2023-05-20 PROCEDURE — 86038 ANTINUCLEAR ANTIBODIES: CPT

## 2023-05-20 PROCEDURE — 2700000000 HC OXYGEN THERAPY PER DAY

## 2023-05-20 PROCEDURE — 85025 COMPLETE CBC W/AUTO DIFF WBC: CPT

## 2023-05-20 PROCEDURE — 36415 COLL VENOUS BLD VENIPUNCTURE: CPT

## 2023-05-20 PROCEDURE — 6360000002 HC RX W HCPCS

## 2023-05-20 PROCEDURE — 6360000002 HC RX W HCPCS: Performed by: NURSE PRACTITIONER

## 2023-05-20 PROCEDURE — 83735 ASSAY OF MAGNESIUM: CPT

## 2023-05-20 PROCEDURE — 80074 ACUTE HEPATITIS PANEL: CPT

## 2023-05-20 PROCEDURE — 94761 N-INVAS EAR/PLS OXIMETRY MLT: CPT

## 2023-05-20 PROCEDURE — 86015 ACTIN ANTIBODY EACH: CPT

## 2023-05-20 PROCEDURE — 83516 IMMUNOASSAY NONANTIBODY: CPT

## 2023-05-20 PROCEDURE — 80076 HEPATIC FUNCTION PANEL: CPT

## 2023-05-20 PROCEDURE — 6370000000 HC RX 637 (ALT 250 FOR IP): Performed by: INTERNAL MEDICINE

## 2023-05-20 PROCEDURE — 80048 BASIC METABOLIC PNL TOTAL CA: CPT

## 2023-05-20 PROCEDURE — 2060000000 HC ICU INTERMEDIATE R&B

## 2023-05-20 PROCEDURE — 2580000003 HC RX 258: Performed by: INTERNAL MEDICINE

## 2023-05-20 RX ORDER — FUROSEMIDE 10 MG/ML
40 INJECTION INTRAMUSCULAR; INTRAVENOUS 2 TIMES DAILY
Status: DISCONTINUED | OUTPATIENT
Start: 2023-05-20 | End: 2023-05-26

## 2023-05-20 RX ADMIN — Medication 10 ML: at 20:30

## 2023-05-20 RX ADMIN — FUROSEMIDE 40 MG: 10 INJECTION, SOLUTION INTRAMUSCULAR; INTRAVENOUS at 18:04

## 2023-05-20 RX ADMIN — PANTOPRAZOLE SODIUM 40 MG: 40 TABLET, DELAYED RELEASE ORAL at 06:26

## 2023-05-20 RX ADMIN — Medication 1 PUFF: at 19:58

## 2023-05-20 RX ADMIN — MULTIPLE VITAMINS W/ MINERALS TAB 1 TABLET: TAB at 10:40

## 2023-05-20 RX ADMIN — DRONABINOL 5 MG: 5 CAPSULE ORAL at 21:10

## 2023-05-20 RX ADMIN — Medication 2 PUFF: at 19:58

## 2023-05-20 RX ADMIN — BUPROPION HYDROCHLORIDE 200 MG: 100 TABLET, EXTENDED RELEASE ORAL at 10:40

## 2023-05-20 RX ADMIN — MONTELUKAST 10 MG: 10 TABLET, FILM COATED ORAL at 20:28

## 2023-05-20 RX ADMIN — FLUTICASONE PROPIONATE 2 SPRAY: 50 SPRAY, METERED NASAL at 10:58

## 2023-05-20 RX ADMIN — METOCLOPRAMIDE 5 MG: 10 TABLET ORAL at 20:29

## 2023-05-20 RX ADMIN — Medication 1 PUFF: at 07:44

## 2023-05-20 RX ADMIN — METOCLOPRAMIDE 5 MG: 10 TABLET ORAL at 10:42

## 2023-05-20 RX ADMIN — LEVETIRACETAM 500 MG: 500 TABLET, FILM COATED ORAL at 20:29

## 2023-05-20 RX ADMIN — ATORVASTATIN CALCIUM 10 MG: 10 TABLET, FILM COATED ORAL at 10:42

## 2023-05-20 RX ADMIN — DRONABINOL 5 MG: 5 CAPSULE ORAL at 12:40

## 2023-05-20 RX ADMIN — Medication 2 PUFF: at 07:44

## 2023-05-20 RX ADMIN — ARIPIPRAZOLE 15 MG: 10 TABLET ORAL at 20:28

## 2023-05-20 RX ADMIN — METOCLOPRAMIDE 5 MG: 10 TABLET ORAL at 06:26

## 2023-05-20 RX ADMIN — CETIRIZINE HYDROCHLORIDE 10 MG: 10 TABLET, FILM COATED ORAL at 10:42

## 2023-05-20 RX ADMIN — METOCLOPRAMIDE 5 MG: 10 TABLET ORAL at 18:03

## 2023-05-20 RX ADMIN — SODIUM CHLORIDE, PRESERVATIVE FREE 10 ML: 5 INJECTION INTRAVENOUS at 10:44

## 2023-05-20 RX ADMIN — LEVETIRACETAM 500 MG: 500 TABLET, FILM COATED ORAL at 10:41

## 2023-05-20 RX ADMIN — Medication 1000 UNITS: at 10:43

## 2023-05-20 RX ADMIN — SODIUM CHLORIDE, PRESERVATIVE FREE 10 ML: 5 INJECTION INTRAVENOUS at 20:30

## 2023-05-20 RX ADMIN — CLOMIPRAMINE HYDROCHLORIDE 150 MG: 25 CAPSULE ORAL at 20:38

## 2023-05-20 ASSESSMENT — PAIN SCALES - GENERAL
PAINLEVEL_OUTOF10: 1
PAINLEVEL_OUTOF10: 0

## 2023-05-21 LAB
ALBUMIN SERPL-MCNC: 3 G/DL (ref 3.4–5)
ALP SERPL-CCNC: 123 U/L (ref 40–129)
ALT SERPL-CCNC: 187 U/L (ref 10–40)
ANA SER QL IA: NEGATIVE
ANION GAP SERPL CALCULATED.3IONS-SCNC: 11 MMOL/L (ref 3–16)
AST SERPL-CCNC: 87 U/L (ref 15–37)
BASOPHILS # BLD: 0 K/UL (ref 0–0.2)
BASOPHILS NFR BLD: 0.4 %
BILIRUB DIRECT SERPL-MCNC: 0.5 MG/DL (ref 0–0.3)
BILIRUB INDIRECT SERPL-MCNC: 0.5 MG/DL (ref 0–1)
BILIRUB SERPL-MCNC: 1 MG/DL (ref 0–1)
BUN SERPL-MCNC: 10 MG/DL (ref 7–20)
CALCIUM SERPL-MCNC: 8.5 MG/DL (ref 8.3–10.6)
CHLORIDE SERPL-SCNC: 98 MMOL/L (ref 99–110)
CO2 SERPL-SCNC: 28 MMOL/L (ref 21–32)
CREAT SERPL-MCNC: 0.9 MG/DL (ref 0.6–1.2)
DEPRECATED RDW RBC AUTO: 17.4 % (ref 12.4–15.4)
EOSINOPHIL # BLD: 0.2 K/UL (ref 0–0.6)
EOSINOPHIL NFR BLD: 3.1 %
GFR SERPLBLD CREATININE-BSD FMLA CKD-EPI: >60 ML/MIN/{1.73_M2}
GLUCOSE BLD-MCNC: 108 MG/DL (ref 70–99)
GLUCOSE BLD-MCNC: 115 MG/DL (ref 70–99)
GLUCOSE BLD-MCNC: 121 MG/DL (ref 70–99)
GLUCOSE BLD-MCNC: 83 MG/DL (ref 70–99)
GLUCOSE SERPL-MCNC: 83 MG/DL (ref 70–99)
HCT VFR BLD AUTO: 29.4 % (ref 36–48)
HGB BLD-MCNC: 9.2 G/DL (ref 12–16)
LYMPHOCYTES # BLD: 1 K/UL (ref 1–5.1)
LYMPHOCYTES NFR BLD: 15.6 %
MAGNESIUM SERPL-MCNC: 2.1 MG/DL (ref 1.8–2.4)
MCH RBC QN AUTO: 31.3 PG (ref 26–34)
MCHC RBC AUTO-ENTMCNC: 31.4 G/DL (ref 31–36)
MCV RBC AUTO: 99.9 FL (ref 80–100)
MONOCYTES # BLD: 0.5 K/UL (ref 0–1.3)
MONOCYTES NFR BLD: 8.2 %
NEUTROPHILS # BLD: 4.7 K/UL (ref 1.7–7.7)
NEUTROPHILS NFR BLD: 72.7 %
NT-PROBNP SERPL-MCNC: 2911 PG/ML (ref 0–124)
PERFORMED ON: ABNORMAL
PERFORMED ON: NORMAL
PLATELET # BLD AUTO: 173 K/UL (ref 135–450)
PMV BLD AUTO: 8.6 FL (ref 5–10.5)
POTASSIUM SERPL-SCNC: 3.4 MMOL/L (ref 3.5–5.1)
PROT SERPL-MCNC: 6 G/DL (ref 6.4–8.2)
RBC # BLD AUTO: 2.95 M/UL (ref 4–5.2)
SODIUM SERPL-SCNC: 137 MMOL/L (ref 136–145)
WBC # BLD AUTO: 6.4 K/UL (ref 4–11)

## 2023-05-21 PROCEDURE — 6360000002 HC RX W HCPCS: Performed by: NURSE PRACTITIONER

## 2023-05-21 PROCEDURE — 99232 SBSQ HOSP IP/OBS MODERATE 35: CPT | Performed by: NURSE PRACTITIONER

## 2023-05-21 PROCEDURE — 94761 N-INVAS EAR/PLS OXIMETRY MLT: CPT

## 2023-05-21 PROCEDURE — 1200000000 HC SEMI PRIVATE

## 2023-05-21 PROCEDURE — 2700000000 HC OXYGEN THERAPY PER DAY

## 2023-05-21 PROCEDURE — 83880 ASSAY OF NATRIURETIC PEPTIDE: CPT

## 2023-05-21 PROCEDURE — 6360000002 HC RX W HCPCS: Performed by: INTERNAL MEDICINE

## 2023-05-21 PROCEDURE — 6370000000 HC RX 637 (ALT 250 FOR IP): Performed by: STUDENT IN AN ORGANIZED HEALTH CARE EDUCATION/TRAINING PROGRAM

## 2023-05-21 PROCEDURE — 6370000000 HC RX 637 (ALT 250 FOR IP): Performed by: INTERNAL MEDICINE

## 2023-05-21 PROCEDURE — 80048 BASIC METABOLIC PNL TOTAL CA: CPT

## 2023-05-21 PROCEDURE — 6360000002 HC RX W HCPCS

## 2023-05-21 PROCEDURE — 94640 AIRWAY INHALATION TREATMENT: CPT

## 2023-05-21 PROCEDURE — 83735 ASSAY OF MAGNESIUM: CPT

## 2023-05-21 PROCEDURE — 80076 HEPATIC FUNCTION PANEL: CPT

## 2023-05-21 PROCEDURE — 85025 COMPLETE CBC W/AUTO DIFF WBC: CPT

## 2023-05-21 PROCEDURE — 36415 COLL VENOUS BLD VENIPUNCTURE: CPT

## 2023-05-21 PROCEDURE — 2580000003 HC RX 258: Performed by: INTERNAL MEDICINE

## 2023-05-21 PROCEDURE — 2060000000 HC ICU INTERMEDIATE R&B

## 2023-05-21 RX ORDER — POTASSIUM CHLORIDE 20 MEQ/1
40 TABLET, EXTENDED RELEASE ORAL ONCE
Status: COMPLETED | OUTPATIENT
Start: 2023-05-21 | End: 2023-05-21

## 2023-05-21 RX ADMIN — Medication 2 PUFF: at 07:48

## 2023-05-21 RX ADMIN — FLUTICASONE PROPIONATE 2 SPRAY: 50 SPRAY, METERED NASAL at 09:23

## 2023-05-21 RX ADMIN — CLOMIPRAMINE HYDROCHLORIDE 150 MG: 25 CAPSULE ORAL at 20:35

## 2023-05-21 RX ADMIN — ARIPIPRAZOLE 15 MG: 10 TABLET ORAL at 20:33

## 2023-05-21 RX ADMIN — LEVETIRACETAM 500 MG: 500 TABLET, FILM COATED ORAL at 20:33

## 2023-05-21 RX ADMIN — SODIUM CHLORIDE, PRESERVATIVE FREE 10 ML: 5 INJECTION INTRAVENOUS at 20:34

## 2023-05-21 RX ADMIN — POTASSIUM CHLORIDE 40 MEQ: 1500 TABLET, EXTENDED RELEASE ORAL at 12:39

## 2023-05-21 RX ADMIN — METOCLOPRAMIDE 5 MG: 10 TABLET ORAL at 18:48

## 2023-05-21 RX ADMIN — Medication 2 PUFF: at 20:41

## 2023-05-21 RX ADMIN — Medication 1 PUFF: at 07:48

## 2023-05-21 RX ADMIN — FUROSEMIDE 40 MG: 10 INJECTION, SOLUTION INTRAMUSCULAR; INTRAVENOUS at 18:48

## 2023-05-21 RX ADMIN — ENOXAPARIN SODIUM 40 MG: 100 INJECTION SUBCUTANEOUS at 09:22

## 2023-05-21 RX ADMIN — Medication 1 PUFF: at 20:41

## 2023-05-21 RX ADMIN — PANTOPRAZOLE SODIUM 40 MG: 40 TABLET, DELAYED RELEASE ORAL at 06:33

## 2023-05-21 RX ADMIN — MULTIPLE VITAMINS W/ MINERALS TAB 1 TABLET: TAB at 09:22

## 2023-05-21 RX ADMIN — ATORVASTATIN CALCIUM 10 MG: 10 TABLET, FILM COATED ORAL at 09:22

## 2023-05-21 RX ADMIN — METOCLOPRAMIDE 5 MG: 10 TABLET ORAL at 09:22

## 2023-05-21 RX ADMIN — FUROSEMIDE 40 MG: 10 INJECTION, SOLUTION INTRAMUSCULAR; INTRAVENOUS at 09:21

## 2023-05-21 RX ADMIN — SODIUM CHLORIDE, PRESERVATIVE FREE 10 ML: 5 INJECTION INTRAVENOUS at 09:21

## 2023-05-21 RX ADMIN — Medication 1000 UNITS: at 09:22

## 2023-05-21 RX ADMIN — BUPROPION HYDROCHLORIDE 200 MG: 100 TABLET, EXTENDED RELEASE ORAL at 09:22

## 2023-05-21 RX ADMIN — LEVETIRACETAM 500 MG: 500 TABLET, FILM COATED ORAL at 09:22

## 2023-05-21 RX ADMIN — DRONABINOL 5 MG: 5 CAPSULE ORAL at 12:39

## 2023-05-21 RX ADMIN — CETIRIZINE HYDROCHLORIDE 10 MG: 10 TABLET, FILM COATED ORAL at 09:22

## 2023-05-21 RX ADMIN — MONTELUKAST 10 MG: 10 TABLET, FILM COATED ORAL at 20:33

## 2023-05-21 RX ADMIN — VALSARTAN 40 MG: 80 TABLET, FILM COATED ORAL at 09:22

## 2023-05-21 RX ADMIN — METOCLOPRAMIDE 5 MG: 10 TABLET ORAL at 20:33

## 2023-05-21 RX ADMIN — METOCLOPRAMIDE 5 MG: 10 TABLET ORAL at 06:33

## 2023-05-21 ASSESSMENT — PAIN SCALES - GENERAL
PAINLEVEL_OUTOF10: 0
PAINLEVEL_OUTOF10: 0

## 2023-05-22 LAB
ALBUMIN SERPL-MCNC: 3.2 G/DL (ref 3.4–5)
ALP SERPL-CCNC: 123 U/L (ref 40–129)
ALT SERPL-CCNC: 147 U/L (ref 10–40)
ANION GAP SERPL CALCULATED.3IONS-SCNC: 14 MMOL/L (ref 3–16)
AST SERPL-CCNC: 60 U/L (ref 15–37)
BASOPHILS # BLD: 0 K/UL (ref 0–0.2)
BASOPHILS NFR BLD: 0.3 %
BILIRUB DIRECT SERPL-MCNC: 0.3 MG/DL (ref 0–0.3)
BILIRUB INDIRECT SERPL-MCNC: 0.4 MG/DL (ref 0–1)
BILIRUB SERPL-MCNC: 0.7 MG/DL (ref 0–1)
BUN SERPL-MCNC: 10 MG/DL (ref 7–20)
CALCIUM SERPL-MCNC: 8.9 MG/DL (ref 8.3–10.6)
CHLORIDE SERPL-SCNC: 96 MMOL/L (ref 99–110)
CO2 SERPL-SCNC: 27 MMOL/L (ref 21–32)
CREAT SERPL-MCNC: 1 MG/DL (ref 0.6–1.2)
DEPRECATED RDW RBC AUTO: 17.4 % (ref 12.4–15.4)
EOSINOPHIL # BLD: 0.2 K/UL (ref 0–0.6)
EOSINOPHIL NFR BLD: 2.3 %
GFR SERPLBLD CREATININE-BSD FMLA CKD-EPI: >60 ML/MIN/{1.73_M2}
GLUCOSE BLD-MCNC: 102 MG/DL (ref 70–99)
GLUCOSE BLD-MCNC: 103 MG/DL (ref 70–99)
GLUCOSE BLD-MCNC: 76 MG/DL (ref 70–99)
GLUCOSE BLD-MCNC: 96 MG/DL (ref 70–99)
GLUCOSE SERPL-MCNC: 83 MG/DL (ref 70–99)
HCT VFR BLD AUTO: 29.4 % (ref 36–48)
HGB BLD-MCNC: 9.5 G/DL (ref 12–16)
LYMPHOCYTES # BLD: 1 K/UL (ref 1–5.1)
LYMPHOCYTES NFR BLD: 14 %
MAGNESIUM SERPL-MCNC: 2.1 MG/DL (ref 1.8–2.4)
MCH RBC QN AUTO: 32.1 PG (ref 26–34)
MCHC RBC AUTO-ENTMCNC: 32.4 G/DL (ref 31–36)
MCV RBC AUTO: 99.1 FL (ref 80–100)
MONOCYTES # BLD: 0.6 K/UL (ref 0–1.3)
MONOCYTES NFR BLD: 8.2 %
NEUTROPHILS # BLD: 5.5 K/UL (ref 1.7–7.7)
NEUTROPHILS NFR BLD: 75.2 %
PERFORMED ON: ABNORMAL
PERFORMED ON: ABNORMAL
PERFORMED ON: NORMAL
PERFORMED ON: NORMAL
PLATELET # BLD AUTO: 189 K/UL (ref 135–450)
PMV BLD AUTO: 8.4 FL (ref 5–10.5)
POTASSIUM SERPL-SCNC: 4.1 MMOL/L (ref 3.5–5.1)
PROT SERPL-MCNC: 6.4 G/DL (ref 6.4–8.2)
RBC # BLD AUTO: 2.97 M/UL (ref 4–5.2)
SMA IGG SER-ACNC: 14 UNITS (ref 0–19)
SODIUM SERPL-SCNC: 137 MMOL/L (ref 136–145)
WBC # BLD AUTO: 7.4 K/UL (ref 4–11)

## 2023-05-22 PROCEDURE — 6370000000 HC RX 637 (ALT 250 FOR IP): Performed by: INTERNAL MEDICINE

## 2023-05-22 PROCEDURE — 2700000000 HC OXYGEN THERAPY PER DAY

## 2023-05-22 PROCEDURE — 6360000002 HC RX W HCPCS: Performed by: NURSE PRACTITIONER

## 2023-05-22 PROCEDURE — 2580000003 HC RX 258: Performed by: INTERNAL MEDICINE

## 2023-05-22 PROCEDURE — 36415 COLL VENOUS BLD VENIPUNCTURE: CPT

## 2023-05-22 PROCEDURE — 94761 N-INVAS EAR/PLS OXIMETRY MLT: CPT

## 2023-05-22 PROCEDURE — 94640 AIRWAY INHALATION TREATMENT: CPT

## 2023-05-22 PROCEDURE — 6360000002 HC RX W HCPCS

## 2023-05-22 PROCEDURE — 99232 SBSQ HOSP IP/OBS MODERATE 35: CPT | Performed by: NURSE PRACTITIONER

## 2023-05-22 PROCEDURE — 1200000000 HC SEMI PRIVATE

## 2023-05-22 PROCEDURE — 83735 ASSAY OF MAGNESIUM: CPT

## 2023-05-22 PROCEDURE — 6360000002 HC RX W HCPCS: Performed by: INTERNAL MEDICINE

## 2023-05-22 PROCEDURE — 85025 COMPLETE CBC W/AUTO DIFF WBC: CPT

## 2023-05-22 PROCEDURE — 80076 HEPATIC FUNCTION PANEL: CPT

## 2023-05-22 PROCEDURE — 80048 BASIC METABOLIC PNL TOTAL CA: CPT

## 2023-05-22 RX ORDER — GUAIFENESIN 600 MG/1
600 TABLET, EXTENDED RELEASE ORAL 2 TIMES DAILY
Status: DISCONTINUED | OUTPATIENT
Start: 2023-05-22 | End: 2023-05-26 | Stop reason: HOSPADM

## 2023-05-22 RX ORDER — SODIUM CHLORIDE FOR INHALATION 3 %
4 VIAL, NEBULIZER (ML) INHALATION PRN
Status: DISCONTINUED | OUTPATIENT
Start: 2023-05-22 | End: 2023-05-26 | Stop reason: HOSPADM

## 2023-05-22 RX ORDER — ALBUTEROL SULFATE 2.5 MG/3ML
2.5 SOLUTION RESPIRATORY (INHALATION) ONCE
Status: COMPLETED | OUTPATIENT
Start: 2023-05-22 | End: 2023-05-22

## 2023-05-22 RX ADMIN — Medication 1000 UNITS: at 09:55

## 2023-05-22 RX ADMIN — METOCLOPRAMIDE 5 MG: 10 TABLET ORAL at 20:18

## 2023-05-22 RX ADMIN — METOCLOPRAMIDE 5 MG: 10 TABLET ORAL at 10:03

## 2023-05-22 RX ADMIN — FLUTICASONE PROPIONATE 2 SPRAY: 50 SPRAY, METERED NASAL at 09:59

## 2023-05-22 RX ADMIN — METOCLOPRAMIDE 5 MG: 10 TABLET ORAL at 05:58

## 2023-05-22 RX ADMIN — ATORVASTATIN CALCIUM 10 MG: 10 TABLET, FILM COATED ORAL at 09:55

## 2023-05-22 RX ADMIN — SODIUM CHLORIDE, PRESERVATIVE FREE 10 ML: 5 INJECTION INTRAVENOUS at 20:19

## 2023-05-22 RX ADMIN — ENOXAPARIN SODIUM 40 MG: 100 INJECTION SUBCUTANEOUS at 10:02

## 2023-05-22 RX ADMIN — Medication 2 PUFF: at 20:29

## 2023-05-22 RX ADMIN — DRONABINOL 5 MG: 5 CAPSULE ORAL at 11:21

## 2023-05-22 RX ADMIN — METOCLOPRAMIDE 5 MG: 10 TABLET ORAL at 17:34

## 2023-05-22 RX ADMIN — Medication 1 PUFF: at 20:22

## 2023-05-22 RX ADMIN — ACETAMINOPHEN 650 MG: 325 TABLET ORAL at 00:52

## 2023-05-22 RX ADMIN — LEVETIRACETAM 500 MG: 500 TABLET, FILM COATED ORAL at 20:18

## 2023-05-22 RX ADMIN — MONTELUKAST 10 MG: 10 TABLET, FILM COATED ORAL at 20:18

## 2023-05-22 RX ADMIN — GUAIFENESIN 600 MG: 600 TABLET ORAL at 20:18

## 2023-05-22 RX ADMIN — ALBUTEROL SULFATE 2.5 MG: 2.5 SOLUTION RESPIRATORY (INHALATION) at 11:20

## 2023-05-22 RX ADMIN — CETIRIZINE HYDROCHLORIDE 10 MG: 10 TABLET, FILM COATED ORAL at 09:55

## 2023-05-22 RX ADMIN — BUPROPION HYDROCHLORIDE 200 MG: 100 TABLET, EXTENDED RELEASE ORAL at 09:55

## 2023-05-22 RX ADMIN — GUAIFENESIN 600 MG: 600 TABLET ORAL at 12:01

## 2023-05-22 RX ADMIN — PANTOPRAZOLE SODIUM 40 MG: 40 TABLET, DELAYED RELEASE ORAL at 05:58

## 2023-05-22 RX ADMIN — FUROSEMIDE 40 MG: 10 INJECTION, SOLUTION INTRAMUSCULAR; INTRAVENOUS at 09:55

## 2023-05-22 RX ADMIN — CLOMIPRAMINE HYDROCHLORIDE 150 MG: 25 CAPSULE ORAL at 20:19

## 2023-05-22 RX ADMIN — LEVETIRACETAM 500 MG: 500 TABLET, FILM COATED ORAL at 09:55

## 2023-05-22 RX ADMIN — Medication 10 ML: at 09:58

## 2023-05-22 RX ADMIN — ARIPIPRAZOLE 15 MG: 10 TABLET ORAL at 20:17

## 2023-05-22 RX ADMIN — VALSARTAN 40 MG: 80 TABLET, FILM COATED ORAL at 09:55

## 2023-05-22 RX ADMIN — Medication 2 PUFF: at 11:24

## 2023-05-22 RX ADMIN — MULTIPLE VITAMINS W/ MINERALS TAB 1 TABLET: TAB at 09:55

## 2023-05-22 RX ADMIN — ACETAMINOPHEN 650 MG: 325 TABLET ORAL at 14:25

## 2023-05-22 RX ADMIN — FUROSEMIDE 40 MG: 10 INJECTION, SOLUTION INTRAMUSCULAR; INTRAVENOUS at 17:34

## 2023-05-22 ASSESSMENT — PAIN SCALES - GENERAL
PAINLEVEL_OUTOF10: 0
PAINLEVEL_OUTOF10: 2

## 2023-05-22 ASSESSMENT — PAIN DESCRIPTION - ORIENTATION: ORIENTATION: UPPER

## 2023-05-22 ASSESSMENT — PAIN - FUNCTIONAL ASSESSMENT: PAIN_FUNCTIONAL_ASSESSMENT: PREVENTS OR INTERFERES SOME ACTIVE ACTIVITIES AND ADLS

## 2023-05-22 ASSESSMENT — PAIN DESCRIPTION - LOCATION: LOCATION: HEAD

## 2023-05-22 ASSESSMENT — PAIN DESCRIPTION - DESCRIPTORS: DESCRIPTORS: ACHING

## 2023-05-22 NOTE — PLAN OF CARE
Problem: Discharge Planning  Goal: Discharge to home or other facility with appropriate resources  Outcome: Progressing     Problem: Safety - Adult  Goal: Free from fall injury  Outcome: Progressing     Problem: Chronic Conditions and Co-morbidities  Goal: Patient's chronic conditions and co-morbidity symptoms are monitored and maintained or improved  5/21/2023 2118 by Lynn Cameron RN  Outcome: Progressing  5/21/2023 0722 by Chuy Krishna RN  Outcome: Progressing     Problem: Pain  Goal: Verbalizes/displays adequate comfort level or baseline comfort level  Outcome: Progressing     Problem: Skin/Tissue Integrity  Goal: Absence of new skin breakdown  Description: 1. Monitor for areas of redness and/or skin breakdown  2. Assess vascular access sites hourly  3. Every 4-6 hours minimum:  Change oxygen saturation probe site  4. Every 4-6 hours:  If on nasal continuous positive airway pressure, respiratory therapy assess nares and determine need for appliance change or resting period.   Outcome: Progressing     Problem: ABCDS Injury Assessment  Goal: Absence of physical injury  Outcome: Progressing

## 2023-05-22 NOTE — PLAN OF CARE
Problem: Safety - Adult  Goal: Free from fall injury  5/22/2023 0935 by Az Trotter RN  Outcome: Progressing  Note: Pt high fall risk. Instructed to use call light before getting out of bed. Call light within reach. Bed in low position. Bed alarm on. Will continue to monitor. Problem: Chronic Conditions and Co-morbidities  Goal: Patient's chronic conditions and co-morbidity symptoms are monitored and maintained or improved  5/22/2023 0935 by Az Trotter RN  Outcome: Progressing  Note: Pt will have accuchecks before meals and at bedtime with sliding scale insulin in place for coverage. Will continue to monitor for signs and symptoms of hypoglycemia and hyperglycemia throughout shift. Problem: Pain  Goal: Verbalizes/displays adequate comfort level or baseline comfort level  5/22/2023 0935 by Az Trotter RN  Outcome: Progressing  Note: Pt will be satisfied with pain control. Pt uses numeric pain rating scale with reassessments after pain med administration. Will continue to monitor progression throughout shift.

## 2023-05-23 ENCOUNTER — ANESTHESIA EVENT (OUTPATIENT)
Dept: CARDIAC CATH/INVASIVE PROCEDURES | Age: 63
End: 2023-05-23
Payer: MEDICARE

## 2023-05-23 ENCOUNTER — ANESTHESIA (OUTPATIENT)
Dept: CARDIAC CATH/INVASIVE PROCEDURES | Age: 63
End: 2023-05-23
Payer: MEDICARE

## 2023-05-23 PROBLEM — I34.0 NONRHEUMATIC MITRAL VALVE REGURGITATION: Status: ACTIVE | Noted: 2023-05-23

## 2023-05-23 PROBLEM — I50.23 ACUTE ON CHRONIC SYSTOLIC (CONGESTIVE) HEART FAILURE (HCC): Status: ACTIVE | Noted: 2023-05-18

## 2023-05-23 LAB
ANION GAP SERPL CALCULATED.3IONS-SCNC: 11 MMOL/L (ref 3–16)
BASOPHILS # BLD: 0 K/UL (ref 0–0.2)
BASOPHILS NFR BLD: 0.6 %
BUN SERPL-MCNC: 7 MG/DL (ref 7–20)
CALCIUM SERPL-MCNC: 8.9 MG/DL (ref 8.3–10.6)
CHLORIDE SERPL-SCNC: 99 MMOL/L (ref 99–110)
CO2 SERPL-SCNC: 30 MMOL/L (ref 21–32)
CREAT SERPL-MCNC: 0.8 MG/DL (ref 0.6–1.2)
DEPRECATED RDW RBC AUTO: 17.6 % (ref 12.4–15.4)
EKG ATRIAL RATE: 103 BPM
EKG DIAGNOSIS: NORMAL
EKG P AXIS: 83 DEGREES
EKG P-R INTERVAL: 168 MS
EKG Q-T INTERVAL: 368 MS
EKG QRS DURATION: 96 MS
EKG QTC CALCULATION (BAZETT): 482 MS
EKG R AXIS: 1 DEGREES
EKG T AXIS: 103 DEGREES
EKG VENTRICULAR RATE: 103 BPM
EOSINOPHIL # BLD: 0.2 K/UL (ref 0–0.6)
EOSINOPHIL NFR BLD: 3.4 %
GFR SERPLBLD CREATININE-BSD FMLA CKD-EPI: >60 ML/MIN/{1.73_M2}
GLUCOSE BLD-MCNC: 106 MG/DL (ref 70–99)
GLUCOSE BLD-MCNC: 107 MG/DL (ref 70–99)
GLUCOSE BLD-MCNC: 109 MG/DL (ref 70–99)
GLUCOSE BLD-MCNC: 83 MG/DL (ref 70–99)
GLUCOSE SERPL-MCNC: 73 MG/DL (ref 70–99)
HCT VFR BLD AUTO: 31 % (ref 36–48)
HGB BLD-MCNC: 10.1 G/DL (ref 12–16)
LYMPHOCYTES # BLD: 0.9 K/UL (ref 1–5.1)
LYMPHOCYTES NFR BLD: 14.5 %
MAGNESIUM SERPL-MCNC: 2.1 MG/DL (ref 1.8–2.4)
MCH RBC QN AUTO: 32.1 PG (ref 26–34)
MCHC RBC AUTO-ENTMCNC: 32.5 G/DL (ref 31–36)
MCV RBC AUTO: 98.7 FL (ref 80–100)
MONOCYTES # BLD: 0.4 K/UL (ref 0–1.3)
MONOCYTES NFR BLD: 6.2 %
NEUTROPHILS # BLD: 4.8 K/UL (ref 1.7–7.7)
NEUTROPHILS NFR BLD: 75.3 %
PERFORMED ON: ABNORMAL
PERFORMED ON: NORMAL
PLATELET # BLD AUTO: 217 K/UL (ref 135–450)
PMV BLD AUTO: 8.2 FL (ref 5–10.5)
POTASSIUM SERPL-SCNC: 4.1 MMOL/L (ref 3.5–5.1)
RBC # BLD AUTO: 3.14 M/UL (ref 4–5.2)
SODIUM SERPL-SCNC: 140 MMOL/L (ref 136–145)
WBC # BLD AUTO: 6.4 K/UL (ref 4–11)

## 2023-05-23 PROCEDURE — 80048 BASIC METABOLIC PNL TOTAL CA: CPT

## 2023-05-23 PROCEDURE — 99232 SBSQ HOSP IP/OBS MODERATE 35: CPT | Performed by: INTERNAL MEDICINE

## 2023-05-23 PROCEDURE — 6370000000 HC RX 637 (ALT 250 FOR IP): Performed by: INTERNAL MEDICINE

## 2023-05-23 PROCEDURE — 2700000000 HC OXYGEN THERAPY PER DAY

## 2023-05-23 PROCEDURE — 97530 THERAPEUTIC ACTIVITIES: CPT

## 2023-05-23 PROCEDURE — 94640 AIRWAY INHALATION TREATMENT: CPT

## 2023-05-23 PROCEDURE — 93005 ELECTROCARDIOGRAM TRACING: CPT | Performed by: INTERNAL MEDICINE

## 2023-05-23 PROCEDURE — 6360000002 HC RX W HCPCS: Performed by: NURSE PRACTITIONER

## 2023-05-23 PROCEDURE — 94761 N-INVAS EAR/PLS OXIMETRY MLT: CPT

## 2023-05-23 PROCEDURE — 97535 SELF CARE MNGMENT TRAINING: CPT

## 2023-05-23 PROCEDURE — 36415 COLL VENOUS BLD VENIPUNCTURE: CPT

## 2023-05-23 PROCEDURE — 97166 OT EVAL MOD COMPLEX 45 MIN: CPT

## 2023-05-23 PROCEDURE — 2580000003 HC RX 258: Performed by: INTERNAL MEDICINE

## 2023-05-23 PROCEDURE — 83735 ASSAY OF MAGNESIUM: CPT

## 2023-05-23 PROCEDURE — 93010 ELECTROCARDIOGRAM REPORT: CPT | Performed by: INTERNAL MEDICINE

## 2023-05-23 PROCEDURE — 1200000000 HC SEMI PRIVATE

## 2023-05-23 PROCEDURE — 85025 COMPLETE CBC W/AUTO DIFF WBC: CPT

## 2023-05-23 PROCEDURE — 6360000002 HC RX W HCPCS: Performed by: INTERNAL MEDICINE

## 2023-05-23 PROCEDURE — 6360000002 HC RX W HCPCS

## 2023-05-23 RX ORDER — ONDANSETRON 2 MG/ML
4 INJECTION INTRAMUSCULAR; INTRAVENOUS EVERY 6 HOURS PRN
Status: DISCONTINUED | OUTPATIENT
Start: 2023-05-23 | End: 2023-05-23 | Stop reason: SDUPTHER

## 2023-05-23 RX ORDER — SODIUM CHLORIDE 0.9 % (FLUSH) 0.9 %
5-40 SYRINGE (ML) INJECTION EVERY 12 HOURS SCHEDULED
Status: DISCONTINUED | OUTPATIENT
Start: 2023-05-23 | End: 2023-05-25

## 2023-05-23 RX ORDER — SODIUM CHLORIDE 0.9 % (FLUSH) 0.9 %
5-40 SYRINGE (ML) INJECTION PRN
Status: DISCONTINUED | OUTPATIENT
Start: 2023-05-23 | End: 2023-05-26 | Stop reason: HOSPADM

## 2023-05-23 RX ORDER — SODIUM CHLORIDE 9 MG/ML
INJECTION, SOLUTION INTRAVENOUS PRN
Status: DISCONTINUED | OUTPATIENT
Start: 2023-05-23 | End: 2023-05-26 | Stop reason: HOSPADM

## 2023-05-23 RX ORDER — LORAZEPAM 0.5 MG/1
0.5 TABLET ORAL
Status: ACTIVE | OUTPATIENT
Start: 2023-05-23 | End: 2023-05-24

## 2023-05-23 RX ADMIN — CLOMIPRAMINE HYDROCHLORIDE 150 MG: 25 CAPSULE ORAL at 20:04

## 2023-05-23 RX ADMIN — CETIRIZINE HYDROCHLORIDE 10 MG: 10 TABLET, FILM COATED ORAL at 10:45

## 2023-05-23 RX ADMIN — ENOXAPARIN SODIUM 40 MG: 100 INJECTION SUBCUTANEOUS at 10:48

## 2023-05-23 RX ADMIN — LEVETIRACETAM 500 MG: 500 TABLET, FILM COATED ORAL at 20:02

## 2023-05-23 RX ADMIN — Medication 1 PUFF: at 19:49

## 2023-05-23 RX ADMIN — BUPROPION HYDROCHLORIDE 200 MG: 100 TABLET, EXTENDED RELEASE ORAL at 10:46

## 2023-05-23 RX ADMIN — PANTOPRAZOLE SODIUM 40 MG: 40 TABLET, DELAYED RELEASE ORAL at 06:20

## 2023-05-23 RX ADMIN — MULTIPLE VITAMINS W/ MINERALS TAB 1 TABLET: TAB at 11:04

## 2023-05-23 RX ADMIN — MONTELUKAST 10 MG: 10 TABLET, FILM COATED ORAL at 20:03

## 2023-05-23 RX ADMIN — LEVETIRACETAM 500 MG: 500 TABLET, FILM COATED ORAL at 10:44

## 2023-05-23 RX ADMIN — FLUTICASONE PROPIONATE 2 SPRAY: 50 SPRAY, METERED NASAL at 10:49

## 2023-05-23 RX ADMIN — Medication 10 ML: at 08:21

## 2023-05-23 RX ADMIN — Medication 2 PUFF: at 19:52

## 2023-05-23 RX ADMIN — METOCLOPRAMIDE 5 MG: 10 TABLET ORAL at 10:44

## 2023-05-23 RX ADMIN — FUROSEMIDE 40 MG: 10 INJECTION, SOLUTION INTRAMUSCULAR; INTRAVENOUS at 16:58

## 2023-05-23 RX ADMIN — GUAIFENESIN 600 MG: 600 TABLET ORAL at 10:44

## 2023-05-23 RX ADMIN — FUROSEMIDE 40 MG: 10 INJECTION, SOLUTION INTRAMUSCULAR; INTRAVENOUS at 10:46

## 2023-05-23 RX ADMIN — GUAIFENESIN 600 MG: 600 TABLET ORAL at 20:02

## 2023-05-23 RX ADMIN — VALSARTAN 40 MG: 80 TABLET, FILM COATED ORAL at 10:45

## 2023-05-23 RX ADMIN — ACETAMINOPHEN 650 MG: 325 TABLET ORAL at 14:56

## 2023-05-23 RX ADMIN — Medication 10 ML: at 20:03

## 2023-05-23 RX ADMIN — ARIPIPRAZOLE 15 MG: 10 TABLET ORAL at 20:03

## 2023-05-23 RX ADMIN — METOCLOPRAMIDE 5 MG: 10 TABLET ORAL at 20:02

## 2023-05-23 RX ADMIN — METOCLOPRAMIDE 5 MG: 10 TABLET ORAL at 16:57

## 2023-05-23 RX ADMIN — Medication 1000 UNITS: at 10:45

## 2023-05-23 RX ADMIN — METOCLOPRAMIDE 5 MG: 10 TABLET ORAL at 06:19

## 2023-05-23 ASSESSMENT — PAIN DESCRIPTION - LOCATION: LOCATION: HEAD

## 2023-05-23 ASSESSMENT — PAIN SCALES - WONG BAKER: WONGBAKER_NUMERICALRESPONSE: 0

## 2023-05-23 ASSESSMENT — PAIN SCALES - GENERAL
PAINLEVEL_OUTOF10: 0
PAINLEVEL_OUTOF10: 2

## 2023-05-23 ASSESSMENT — PAIN DESCRIPTION - DESCRIPTORS: DESCRIPTORS: ACHING

## 2023-05-23 ASSESSMENT — PAIN - FUNCTIONAL ASSESSMENT: PAIN_FUNCTIONAL_ASSESSMENT: ACTIVITIES ARE NOT PREVENTED

## 2023-05-23 ASSESSMENT — ENCOUNTER SYMPTOMS: SHORTNESS OF BREATH: 1

## 2023-05-23 ASSESSMENT — PAIN DESCRIPTION - ORIENTATION: ORIENTATION: UPPER

## 2023-05-23 NOTE — ANESTHESIA PRE PROCEDURE
Department of Anesthesiology  Preprocedure Note       Name:  Giuliano Cortez   Age:  61 y.o.  :  1960                                          MRN:  [id number]         Date:  2023      Surgeon: * Surgery not found *    Procedure:     Medications prior to admission:   Prior to Admission medications    Medication Sig Start Date End Date Taking? Authorizing Provider   acetaminophen (TYLENOL) 500 MG tablet Take 2 tablets by mouth every 6 hours as needed for Pain   Yes Historical Provider, MD   Bismuth Subsalicylate (PEPTO-BISMOL PO) Take 2 tablets by mouth as needed   Yes Historical Provider, MD   Magnesium Hydroxide (MILK OF MAGNESIA PO) Take by mouth daily as needed   Yes Historical Provider, MD   diphenhydrAMINE (BENADRYL) 25 MG capsule Take 1 capsule by mouth every 6 hours as needed for Itching   Yes Historical Provider, MD   montelukast (SINGULAIR) 10 MG tablet TAKE 1 TABLET BY MOUTH AT BEDTIME. 23   LILIA Fields CNP   bacitracin-polymyxin b (POLYSPORIN) 500-70205 UNIT/GM ointment Apply topically 2 times daily    Historical Provider, MD   furosemide (LASIX) 40 MG tablet Take 1 tablet by mouth daily for 2 days 23  Kenton Downey DO   ARIPiprazole (ABILIFY) 15 MG tablet Take 1 tablet by mouth at bedtime 3/16/23   Historical Provider, MD   furosemide (LASIX) 20 MG tablet Take 3 tablets by mouth 2 times daily 3/16/23   Historical Provider, MD   diclofenac sodium (VOLTAREN) 1 % GEL Apply 2 g topically 4 times daily 22   Emigdio Cervantes MD   budesonide-formoterol (SYMBICORT) 160-4.5 MCG/ACT AERO Inhale 1 puff into the lungs in the morning and 1 puff before bedtime. Use with spacer. Rinse mouth out after use to prevent hoarseness and thrush. . 22   LILIA Fields CNP   Spacer/Aero-Holding Anabela Ethan 1 Device by Does not apply route daily as needed (use as needed with inhaler) 22   LILIA Fields CNP   estradiol (CLIMARA) 0.025 MG/24HR once a week

## 2023-05-23 NOTE — CARE COORDINATION
Spoke with MD and RN both who state patient is getting a DUSTY tomorrow after diuresing some more. Spoke with patient at bedside for introduction. Discussed the plan. She verbalized understanding. Per past CM note, patient's sister (POA) was wanting to get home care at discharge through [employer] as patient has had them before. Placed call to [facility] with [facility] and notified of referral.  She states they are able to follow at discharge for home care needs.

## 2023-05-23 NOTE — PLAN OF CARE
Problem: Discharge Planning  Goal: Discharge to home or other facility with appropriate resources  Outcome: Progressing     Problem: Safety - Adult  Goal: Free from fall injury  5/22/2023 2040 by Bobo Rankin RN  Outcome: Progressing  5/22/2023 0935 by Randy Alvarado RN  Outcome: Progressing  Note: Pt high fall risk. Instructed to use call light before getting out of bed. Call light within reach. Bed in low position. Bed alarm on. Will continue to monitor. Problem: Chronic Conditions and Co-morbidities  Goal: Patient's chronic conditions and co-morbidity symptoms are monitored and maintained or improved  5/22/2023 2040 by Bobo Rankin RN  Outcome: Progressing  5/22/2023 0935 by Randy Alvarado RN  Outcome: Progressing  Note: Pt will have accuchecks before meals and at bedtime with sliding scale insulin in place for coverage. Will continue to monitor for signs and symptoms of hypoglycemia and hyperglycemia throughout shift. Problem: Pain  Goal: Verbalizes/displays adequate comfort level or baseline comfort level  5/22/2023 2040 by Bobo Rankin RN  Outcome: Progressing  5/22/2023 0935 by Randy Alvarado RN  Outcome: Progressing  Note: Pt will be satisfied with pain control. Pt uses numeric pain rating scale with reassessments after pain med administration. Will continue to monitor progression throughout shift. Problem: Skin/Tissue Integrity  Goal: Absence of new skin breakdown  Description: 1. Monitor for areas of redness and/or skin breakdown  2. Assess vascular access sites hourly  3. Every 4-6 hours minimum:  Change oxygen saturation probe site  4. Every 4-6 hours:  If on nasal continuous positive airway pressure, respiratory therapy assess nares and determine need for appliance change or resting period.   Outcome: Progressing     Problem: ABCDS Injury Assessment  Goal: Absence of physical injury  Outcome: Progressing

## 2023-05-23 NOTE — PLAN OF CARE
Problem: Safety - Adult  Goal: Free from fall injury  Outcome: Progressing  Note: Pt high fall risk. Instructed to use call light before getting out of bed. Call light within reach. Bed in low position. Bed alarm on. Will continue to monitor. Problem: Chronic Conditions and Co-morbidities  Goal: Patient's chronic conditions and co-morbidity symptoms are monitored and maintained or improved  Outcome: Progressing  Note: Pt will have accuchecks before meals and at bedtime with sliding scale insulin in place for coverage. Will continue to monitor for signs and symptoms of hypoglycemia and hyperglycemia throughout shift. Problem: Pain  Goal: Verbalizes/displays adequate comfort level or baseline comfort level  Outcome: Progressing  Note: Pt will be satisfied with pain control. Pt uses numeric pain rating scale with reassessments after pain med administration. Will continue to monitor progression throughout shift.

## 2023-05-23 NOTE — CONSULTS
GI NOTE:   Consult completed on 5/20, refer to Dr. Lillie Duran note. Please call with questions or concerns. [phone]. Also available via perfect serve.

## 2023-05-24 ENCOUNTER — APPOINTMENT (OUTPATIENT)
Dept: GENERAL RADIOLOGY | Age: 63
End: 2023-05-24
Payer: MEDICARE

## 2023-05-24 LAB
ANION GAP SERPL CALCULATED.3IONS-SCNC: 13 MMOL/L (ref 3–16)
BASOPHILS # BLD: 0.1 K/UL (ref 0–0.2)
BASOPHILS NFR BLD: 1.1 %
BUN SERPL-MCNC: 7 MG/DL (ref 7–20)
CALCIUM SERPL-MCNC: 9.2 MG/DL (ref 8.3–10.6)
CHLORIDE SERPL-SCNC: 96 MMOL/L (ref 99–110)
CO2 SERPL-SCNC: 27 MMOL/L (ref 21–32)
CREAT SERPL-MCNC: 0.9 MG/DL (ref 0.6–1.2)
DEPRECATED RDW RBC AUTO: 17.5 % (ref 12.4–15.4)
EOSINOPHIL # BLD: 0.2 K/UL (ref 0–0.6)
EOSINOPHIL NFR BLD: 3.2 %
GFR SERPLBLD CREATININE-BSD FMLA CKD-EPI: >60 ML/MIN/{1.73_M2}
GLUCOSE BLD-MCNC: 118 MG/DL (ref 70–99)
GLUCOSE BLD-MCNC: 131 MG/DL (ref 70–99)
GLUCOSE BLD-MCNC: 81 MG/DL (ref 70–99)
GLUCOSE BLD-MCNC: 90 MG/DL (ref 70–99)
GLUCOSE SERPL-MCNC: 94 MG/DL (ref 70–99)
HCT VFR BLD AUTO: 31.9 % (ref 36–48)
HGB BLD-MCNC: 10.2 G/DL (ref 12–16)
LYMPHOCYTES # BLD: 1.2 K/UL (ref 1–5.1)
LYMPHOCYTES NFR BLD: 15.4 %
MCH RBC QN AUTO: 31.6 PG (ref 26–34)
MCHC RBC AUTO-ENTMCNC: 32.1 G/DL (ref 31–36)
MCV RBC AUTO: 98.4 FL (ref 80–100)
MONOCYTES # BLD: 0.6 K/UL (ref 0–1.3)
MONOCYTES NFR BLD: 7.5 %
NEUTROPHILS # BLD: 5.6 K/UL (ref 1.7–7.7)
NEUTROPHILS NFR BLD: 72.8 %
NT-PROBNP SERPL-MCNC: 1713 PG/ML (ref 0–124)
PERFORMED ON: ABNORMAL
PERFORMED ON: ABNORMAL
PERFORMED ON: NORMAL
PERFORMED ON: NORMAL
PLATELET # BLD AUTO: 224 K/UL (ref 135–450)
PMV BLD AUTO: 8.3 FL (ref 5–10.5)
POTASSIUM SERPL-SCNC: 4.1 MMOL/L (ref 3.5–5.1)
RBC # BLD AUTO: 3.24 M/UL (ref 4–5.2)
SODIUM SERPL-SCNC: 136 MMOL/L (ref 136–145)
WBC # BLD AUTO: 7.8 K/UL (ref 4–11)

## 2023-05-24 PROCEDURE — 6360000002 HC RX W HCPCS: Performed by: NURSE PRACTITIONER

## 2023-05-24 PROCEDURE — 83880 ASSAY OF NATRIURETIC PEPTIDE: CPT

## 2023-05-24 PROCEDURE — 80048 BASIC METABOLIC PNL TOTAL CA: CPT

## 2023-05-24 PROCEDURE — 1200000000 HC SEMI PRIVATE

## 2023-05-24 PROCEDURE — 71046 X-RAY EXAM CHEST 2 VIEWS: CPT

## 2023-05-24 PROCEDURE — 97162 PT EVAL MOD COMPLEX 30 MIN: CPT

## 2023-05-24 PROCEDURE — 2700000000 HC OXYGEN THERAPY PER DAY

## 2023-05-24 PROCEDURE — 94640 AIRWAY INHALATION TREATMENT: CPT

## 2023-05-24 PROCEDURE — 6370000000 HC RX 637 (ALT 250 FOR IP): Performed by: NURSE PRACTITIONER

## 2023-05-24 PROCEDURE — 6370000000 HC RX 637 (ALT 250 FOR IP): Performed by: STUDENT IN AN ORGANIZED HEALTH CARE EDUCATION/TRAINING PROGRAM

## 2023-05-24 PROCEDURE — 2580000003 HC RX 258: Performed by: INTERNAL MEDICINE

## 2023-05-24 PROCEDURE — 36415 COLL VENOUS BLD VENIPUNCTURE: CPT

## 2023-05-24 PROCEDURE — 97116 GAIT TRAINING THERAPY: CPT

## 2023-05-24 PROCEDURE — 85025 COMPLETE CBC W/AUTO DIFF WBC: CPT

## 2023-05-24 PROCEDURE — 6370000000 HC RX 637 (ALT 250 FOR IP): Performed by: INTERNAL MEDICINE

## 2023-05-24 PROCEDURE — 6360000002 HC RX W HCPCS

## 2023-05-24 PROCEDURE — 99232 SBSQ HOSP IP/OBS MODERATE 35: CPT | Performed by: INTERNAL MEDICINE

## 2023-05-24 RX ORDER — AZITHROMYCIN 250 MG/1
500 TABLET, FILM COATED ORAL DAILY
Status: COMPLETED | OUTPATIENT
Start: 2023-05-24 | End: 2023-05-24

## 2023-05-24 RX ORDER — METOPROLOL SUCCINATE 25 MG/1
25 TABLET, EXTENDED RELEASE ORAL DAILY
Status: DISCONTINUED | OUTPATIENT
Start: 2023-05-24 | End: 2023-05-26 | Stop reason: HOSPADM

## 2023-05-24 RX ORDER — BENZONATATE 100 MG/1
200 CAPSULE ORAL 3 TIMES DAILY PRN
Status: DISCONTINUED | OUTPATIENT
Start: 2023-05-24 | End: 2023-05-26 | Stop reason: HOSPADM

## 2023-05-24 RX ORDER — AZITHROMYCIN 250 MG/1
250 TABLET, FILM COATED ORAL DAILY
Status: DISCONTINUED | OUTPATIENT
Start: 2023-05-25 | End: 2023-05-26 | Stop reason: HOSPADM

## 2023-05-24 RX ADMIN — METOPROLOL SUCCINATE 25 MG: 25 TABLET, EXTENDED RELEASE ORAL at 17:14

## 2023-05-24 RX ADMIN — SODIUM CHLORIDE, PRESERVATIVE FREE 10 ML: 5 INJECTION INTRAVENOUS at 21:37

## 2023-05-24 RX ADMIN — CLOMIPRAMINE HYDROCHLORIDE 150 MG: 25 CAPSULE ORAL at 21:36

## 2023-05-24 RX ADMIN — AZITHROMYCIN MONOHYDRATE 500 MG: 250 TABLET ORAL at 14:43

## 2023-05-24 RX ADMIN — SODIUM CHLORIDE, PRESERVATIVE FREE 10 ML: 5 INJECTION INTRAVENOUS at 08:45

## 2023-05-24 RX ADMIN — LEVETIRACETAM 500 MG: 500 TABLET, FILM COATED ORAL at 08:42

## 2023-05-24 RX ADMIN — ARIPIPRAZOLE 15 MG: 10 TABLET ORAL at 21:18

## 2023-05-24 RX ADMIN — FUROSEMIDE 40 MG: 10 INJECTION, SOLUTION INTRAMUSCULAR; INTRAVENOUS at 17:14

## 2023-05-24 RX ADMIN — METOCLOPRAMIDE 5 MG: 10 TABLET ORAL at 21:17

## 2023-05-24 RX ADMIN — Medication 10 ML: at 08:44

## 2023-05-24 RX ADMIN — Medication 10 ML: at 21:19

## 2023-05-24 RX ADMIN — FLUTICASONE PROPIONATE 2 SPRAY: 50 SPRAY, METERED NASAL at 08:45

## 2023-05-24 RX ADMIN — Medication 1000 UNITS: at 08:43

## 2023-05-24 RX ADMIN — FUROSEMIDE 40 MG: 10 INJECTION, SOLUTION INTRAMUSCULAR; INTRAVENOUS at 08:43

## 2023-05-24 RX ADMIN — VALSARTAN 40 MG: 80 TABLET, FILM COATED ORAL at 08:43

## 2023-05-24 RX ADMIN — CETIRIZINE HYDROCHLORIDE 10 MG: 10 TABLET, FILM COATED ORAL at 08:42

## 2023-05-24 RX ADMIN — DRONABINOL 5 MG: 5 CAPSULE ORAL at 22:14

## 2023-05-24 RX ADMIN — Medication 1 PUFF: at 20:34

## 2023-05-24 RX ADMIN — Medication 1 PUFF: at 08:38

## 2023-05-24 RX ADMIN — MONTELUKAST 10 MG: 10 TABLET, FILM COATED ORAL at 21:17

## 2023-05-24 RX ADMIN — METOCLOPRAMIDE 5 MG: 10 TABLET ORAL at 17:14

## 2023-05-24 RX ADMIN — BUPROPION HYDROCHLORIDE 200 MG: 100 TABLET, EXTENDED RELEASE ORAL at 08:42

## 2023-05-24 RX ADMIN — GUAIFENESIN 600 MG: 600 TABLET ORAL at 21:17

## 2023-05-24 RX ADMIN — GUAIFENESIN 600 MG: 600 TABLET ORAL at 08:44

## 2023-05-24 RX ADMIN — ONDANSETRON 4 MG: 4 TABLET, ORALLY DISINTEGRATING ORAL at 19:40

## 2023-05-24 RX ADMIN — BENZONATATE 200 MG: 100 CAPSULE ORAL at 02:35

## 2023-05-24 RX ADMIN — Medication 2 PUFF: at 20:35

## 2023-05-24 RX ADMIN — LEVETIRACETAM 500 MG: 500 TABLET, FILM COATED ORAL at 21:18

## 2023-05-24 RX ADMIN — Medication 2 PUFF: at 08:38

## 2023-05-24 RX ADMIN — Medication 10 ML: at 21:38

## 2023-05-24 ASSESSMENT — PAIN SCALES - GENERAL: PAINLEVEL_OUTOF10: 0

## 2023-05-24 NOTE — PLAN OF CARE
Problem: Discharge Planning  Goal: Discharge to home or other facility with appropriate resources  Outcome: Progressing     Problem: Safety - Adult  Goal: Free from fall injury  5/23/2023 2249 by Hernesto Virk RN  Outcome: Progressing  5/23/2023 1122 by Andre Bucio RN  Outcome: Progressing  Note: Pt high fall risk. Instructed to use call light before getting out of bed. Call light within reach. Bed in low position. Bed alarm on. Will continue to monitor. Problem: Chronic Conditions and Co-morbidities  Goal: Patient's chronic conditions and co-morbidity symptoms are monitored and maintained or improved  5/23/2023 2249 by Hernesto Virk RN  Outcome: Progressing  5/23/2023 1122 by Andre Bucio RN  Outcome: Progressing  Note: Pt will have accuchecks before meals and at bedtime with sliding scale insulin in place for coverage. Will continue to monitor for signs and symptoms of hypoglycemia and hyperglycemia throughout shift. Problem: Pain  Goal: Verbalizes/displays adequate comfort level or baseline comfort level  5/23/2023 2249 by Hernesto Virk RN  Outcome: Progressing  5/23/2023 1122 by Andre Bucio RN  Outcome: Progressing  Note: Pt will be satisfied with pain control. Pt uses numeric pain rating scale with reassessments after pain med administration. Will continue to monitor progression throughout shift. Problem: Skin/Tissue Integrity  Goal: Absence of new skin breakdown  Description: 1. Monitor for areas of redness and/or skin breakdown  2. Assess vascular access sites hourly  3. Every 4-6 hours minimum:  Change oxygen saturation probe site  4. Every 4-6 hours:  If on nasal continuous positive airway pressure, respiratory therapy assess nares and determine need for appliance change or resting period.   Outcome: Progressing     Problem: ABCDS Injury Assessment  Goal: Absence of physical injury  Outcome: Progressing

## 2023-05-25 LAB
ANION GAP SERPL CALCULATED.3IONS-SCNC: 11 MMOL/L (ref 3–16)
BASOPHILS # BLD: 0 K/UL (ref 0–0.2)
BASOPHILS NFR BLD: 0.7 %
BUN SERPL-MCNC: 12 MG/DL (ref 7–20)
CALCIUM SERPL-MCNC: 9.3 MG/DL (ref 8.3–10.6)
CHLORIDE SERPL-SCNC: 97 MMOL/L (ref 99–110)
CO2 SERPL-SCNC: 30 MMOL/L (ref 21–32)
CREAT SERPL-MCNC: 1 MG/DL (ref 0.6–1.2)
DEPRECATED RDW RBC AUTO: 17.5 % (ref 12.4–15.4)
EOSINOPHIL # BLD: 0.2 K/UL (ref 0–0.6)
EOSINOPHIL NFR BLD: 2.7 %
GFR SERPLBLD CREATININE-BSD FMLA CKD-EPI: >60 ML/MIN/{1.73_M2}
GLUCOSE BLD-MCNC: 131 MG/DL (ref 70–99)
GLUCOSE BLD-MCNC: 164 MG/DL (ref 70–99)
GLUCOSE BLD-MCNC: 72 MG/DL (ref 70–99)
GLUCOSE BLD-MCNC: 89 MG/DL (ref 70–99)
GLUCOSE SERPL-MCNC: 92 MG/DL (ref 70–99)
HCT VFR BLD AUTO: 31.2 % (ref 36–48)
HGB BLD-MCNC: 10.1 G/DL (ref 12–16)
LYMPHOCYTES # BLD: 1.4 K/UL (ref 1–5.1)
LYMPHOCYTES NFR BLD: 18.5 %
MCH RBC QN AUTO: 32.4 PG (ref 26–34)
MCHC RBC AUTO-ENTMCNC: 32.3 G/DL (ref 31–36)
MCV RBC AUTO: 100.4 FL (ref 80–100)
MITOCHONDRIA M2 AB SER IA-ACNC: <0.5 U/ML (ref 0–4)
MONOCYTES # BLD: 0.6 K/UL (ref 0–1.3)
MONOCYTES NFR BLD: 7.5 %
NEUTROPHILS # BLD: 5.2 K/UL (ref 1.7–7.7)
NEUTROPHILS NFR BLD: 70.6 %
PERFORMED ON: ABNORMAL
PERFORMED ON: ABNORMAL
PERFORMED ON: NORMAL
PERFORMED ON: NORMAL
PLATELET # BLD AUTO: 215 K/UL (ref 135–450)
PMV BLD AUTO: 8.4 FL (ref 5–10.5)
POTASSIUM SERPL-SCNC: 4.7 MMOL/L (ref 3.5–5.1)
RBC # BLD AUTO: 3.1 M/UL (ref 4–5.2)
SODIUM SERPL-SCNC: 138 MMOL/L (ref 136–145)
WBC # BLD AUTO: 7.4 K/UL (ref 4–11)

## 2023-05-25 PROCEDURE — 97530 THERAPEUTIC ACTIVITIES: CPT

## 2023-05-25 PROCEDURE — 2580000003 HC RX 258: Performed by: INTERNAL MEDICINE

## 2023-05-25 PROCEDURE — 6360000002 HC RX W HCPCS

## 2023-05-25 PROCEDURE — 2700000000 HC OXYGEN THERAPY PER DAY

## 2023-05-25 PROCEDURE — 85025 COMPLETE CBC W/AUTO DIFF WBC: CPT

## 2023-05-25 PROCEDURE — 94640 AIRWAY INHALATION TREATMENT: CPT

## 2023-05-25 PROCEDURE — 36415 COLL VENOUS BLD VENIPUNCTURE: CPT

## 2023-05-25 PROCEDURE — 6370000000 HC RX 637 (ALT 250 FOR IP): Performed by: STUDENT IN AN ORGANIZED HEALTH CARE EDUCATION/TRAINING PROGRAM

## 2023-05-25 PROCEDURE — 6370000000 HC RX 637 (ALT 250 FOR IP): Performed by: INTERNAL MEDICINE

## 2023-05-25 PROCEDURE — 6360000002 HC RX W HCPCS: Performed by: INTERNAL MEDICINE

## 2023-05-25 PROCEDURE — 97116 GAIT TRAINING THERAPY: CPT

## 2023-05-25 PROCEDURE — 99232 SBSQ HOSP IP/OBS MODERATE 35: CPT | Performed by: INTERNAL MEDICINE

## 2023-05-25 PROCEDURE — 80048 BASIC METABOLIC PNL TOTAL CA: CPT

## 2023-05-25 PROCEDURE — 6360000002 HC RX W HCPCS: Performed by: NURSE PRACTITIONER

## 2023-05-25 PROCEDURE — 94761 N-INVAS EAR/PLS OXIMETRY MLT: CPT

## 2023-05-25 PROCEDURE — 1200000000 HC SEMI PRIVATE

## 2023-05-25 PROCEDURE — 97110 THERAPEUTIC EXERCISES: CPT

## 2023-05-25 RX ADMIN — Medication 1 PUFF: at 08:36

## 2023-05-25 RX ADMIN — MULTIPLE VITAMINS W/ MINERALS TAB 1 TABLET: TAB at 09:20

## 2023-05-25 RX ADMIN — VALSARTAN 40 MG: 80 TABLET, FILM COATED ORAL at 09:23

## 2023-05-25 RX ADMIN — Medication 2 PUFF: at 08:36

## 2023-05-25 RX ADMIN — ARIPIPRAZOLE 15 MG: 10 TABLET ORAL at 21:05

## 2023-05-25 RX ADMIN — METOCLOPRAMIDE 5 MG: 10 TABLET ORAL at 10:33

## 2023-05-25 RX ADMIN — Medication 10 ML: at 09:25

## 2023-05-25 RX ADMIN — DRONABINOL 5 MG: 5 CAPSULE ORAL at 22:40

## 2023-05-25 RX ADMIN — ENOXAPARIN SODIUM 40 MG: 100 INJECTION SUBCUTANEOUS at 09:26

## 2023-05-25 RX ADMIN — LEVETIRACETAM 500 MG: 500 TABLET, FILM COATED ORAL at 09:23

## 2023-05-25 RX ADMIN — ACETAMINOPHEN 650 MG: 325 TABLET ORAL at 21:12

## 2023-05-25 RX ADMIN — ONDANSETRON 4 MG: 2 INJECTION INTRAMUSCULAR; INTRAVENOUS at 19:35

## 2023-05-25 RX ADMIN — CETIRIZINE HYDROCHLORIDE 10 MG: 10 TABLET, FILM COATED ORAL at 09:24

## 2023-05-25 RX ADMIN — METOCLOPRAMIDE 5 MG: 10 TABLET ORAL at 17:13

## 2023-05-25 RX ADMIN — FLUTICASONE PROPIONATE 2 SPRAY: 50 SPRAY, METERED NASAL at 09:26

## 2023-05-25 RX ADMIN — BUPROPION HYDROCHLORIDE 200 MG: 100 TABLET, EXTENDED RELEASE ORAL at 09:23

## 2023-05-25 RX ADMIN — CLOMIPRAMINE HYDROCHLORIDE 150 MG: 25 CAPSULE ORAL at 21:06

## 2023-05-25 RX ADMIN — METOPROLOL SUCCINATE 25 MG: 25 TABLET, EXTENDED RELEASE ORAL at 10:34

## 2023-05-25 RX ADMIN — AZITHROMYCIN MONOHYDRATE 250 MG: 250 TABLET ORAL at 09:19

## 2023-05-25 RX ADMIN — MONTELUKAST 10 MG: 10 TABLET, FILM COATED ORAL at 21:05

## 2023-05-25 RX ADMIN — FUROSEMIDE 40 MG: 10 INJECTION, SOLUTION INTRAMUSCULAR; INTRAVENOUS at 17:14

## 2023-05-25 RX ADMIN — METOCLOPRAMIDE 5 MG: 10 TABLET ORAL at 06:38

## 2023-05-25 RX ADMIN — Medication 1000 UNITS: at 09:19

## 2023-05-25 RX ADMIN — GUAIFENESIN 600 MG: 600 TABLET ORAL at 21:05

## 2023-05-25 RX ADMIN — Medication 1 PUFF: at 19:57

## 2023-05-25 RX ADMIN — METOCLOPRAMIDE 5 MG: 10 TABLET ORAL at 21:05

## 2023-05-25 RX ADMIN — FUROSEMIDE 40 MG: 10 INJECTION, SOLUTION INTRAMUSCULAR; INTRAVENOUS at 09:25

## 2023-05-25 RX ADMIN — PANTOPRAZOLE SODIUM 40 MG: 40 TABLET, DELAYED RELEASE ORAL at 06:39

## 2023-05-25 RX ADMIN — Medication 2 PUFF: at 20:03

## 2023-05-25 RX ADMIN — GUAIFENESIN 600 MG: 600 TABLET ORAL at 09:20

## 2023-05-25 RX ADMIN — LEVETIRACETAM 500 MG: 500 TABLET, FILM COATED ORAL at 21:05

## 2023-05-25 ASSESSMENT — PAIN DESCRIPTION - LOCATION: LOCATION: HEAD

## 2023-05-25 ASSESSMENT — PAIN SCALES - GENERAL
PAINLEVEL_OUTOF10: 0
PAINLEVEL_OUTOF10: 4

## 2023-05-25 ASSESSMENT — PAIN SCALES - WONG BAKER: WONGBAKER_NUMERICALRESPONSE: 4

## 2023-05-25 ASSESSMENT — PAIN DESCRIPTION - DESCRIPTORS: DESCRIPTORS: ACHING

## 2023-05-25 NOTE — CARE COORDINATION
Chart reviewed day #7. Per chart review and RN, [facility] was cancelled again; may do as outpatient. Patient still being diuresed and has not been seen by Cardiology yet. Patient will be discharged back to her group home with home care through [address] [place] [address].

## 2023-05-25 NOTE — PLAN OF CARE
Problem: Chronic Conditions and Co-morbidities  Goal: Patient's chronic conditions and co-morbidity symptoms are monitored and maintained or improved  5/25/2023 1213 by Dawit Alford RN  Outcome: Progressing  Note:   Patient's EF (Ejection Fraction) is less than 40%    Heart Failure Medications:  Diuretics[de-identified] Furosemide    (One of the following REQUIRED for EF </= 40%/SYSTOLIC FAILURE but MAY be used in EF% >40%/DIASTOLIC FAILURE)        ACE[de-identified] None        ARB[de-identified] Valsartan         ARNI[de-identified] None    (Beta Blockers)  NON- Evidenced Based Beta Blocker (for EF% >40%/DIASTOLIC FAILURE): None    Evidenced Based Beta Blocker::(REQUIRED for EF% <40%/SYSTOLIC FAILURE) Metoprolol SUCCinate- Toprol XL  . .................................................................................................................................................. Healthy Weight Tracking - BMI + Meds 5/20/2023 5/21/2023 5/21/2023 5/22/2023 5/23/2023 5/24/2023 5/25/2023   Weight 149 lb 3.2 oz 160 lb 15 oz 150 lb 149 lb 4.8 oz 145 lb 3.2 oz 144 lb 144 lb 3.2 oz   Height - - - - - - -   Body Mass Index 28.19 kg/m2 30.41 kg/m2 28.34 kg/m2 28.21 kg/m2 27.44 kg/m2 27.21 kg/m2 27.25 kg/m2   Some recent data might be hidden         Patient's weights and intake/output reviewed: Yes    Daily Weight log at bedside and being used: \"yes    Patient's Last Weight: 144 lbs obtained by standing scale. Difference of 0 lbs     than last documented weight.       Intake/Output Summary (Last 24 hours) at 5/25/2023 1213  Last data filed at 5/25/2023 1002  Gross per 24 hour   Intake 1080 ml   Output 2250 ml   Net -1170 ml       Education Booklet Provided: yes    Comorbidities Reviewed Yes    Patient has a past medical history of Asthma, Blind left eye, Cardiomyopathy ([address] CHF (congestive heart failure) ([address] COVID-19, Depression, Diabetes, Dizziness and giddiness, GALLAGHER (dyspnea on exertion), Fractures, Hyperthyroidism, Hypoxemia, Mental retardation, Mitral valve

## 2023-05-25 NOTE — DISCHARGE INSTR - COC
Score:  Readmission Risk              Risk of Unplanned Readmission:  25           Discharging to Facility/ Agency   Name: [facility]   Address:  Phone:  Fax:    Dialysis Facility (if applicable)   Name:  Address:  Dialysis Schedule:  Phone:  Fax:    / signature: Electronically signed by Titi Agarwal RN on 5/26/23 at 12:39 PM EDT    PHYSICIAN SECTION    Prognosis: Good    Condition at Discharge: Stable    Rehab Potential (if transferring to Rehab): Good    Recommended Labs or Other Treatments After Discharge: N/A    Physician Certification: I certify the above information and transfer of Vashti Andrews  is necessary for the continuing treatment of the diagnosis listed and that she requires Intermediate/Mental Retardation/Developmental Disabilities Care for greater 30 days.      Update Admission H&P: No change in H&P    PHYSICIAN SIGNATURE:  Electronically signed by Janet Henry DO on 5/25/23 at 11:23 AM EDT

## 2023-05-26 VITALS
SYSTOLIC BLOOD PRESSURE: 122 MMHG | WEIGHT: 143.8 LBS | TEMPERATURE: 98.1 F | RESPIRATION RATE: 18 BRPM | DIASTOLIC BLOOD PRESSURE: 94 MMHG | HEIGHT: 61 IN | HEART RATE: 81 BPM | OXYGEN SATURATION: 100 % | BODY MASS INDEX: 27.15 KG/M2

## 2023-05-26 LAB
ANION GAP SERPL CALCULATED.3IONS-SCNC: 13 MMOL/L (ref 3–16)
BASOPHILS # BLD: 0.1 K/UL (ref 0–0.2)
BASOPHILS NFR BLD: 0.9 %
BUN SERPL-MCNC: 18 MG/DL (ref 7–20)
CALCIUM SERPL-MCNC: 9.1 MG/DL (ref 8.3–10.6)
CHLORIDE SERPL-SCNC: 91 MMOL/L (ref 99–110)
CO2 SERPL-SCNC: 27 MMOL/L (ref 21–32)
CREAT SERPL-MCNC: 1.3 MG/DL (ref 0.6–1.2)
DEPRECATED RDW RBC AUTO: 17.9 % (ref 12.4–15.4)
EOSINOPHIL # BLD: 0.2 K/UL (ref 0–0.6)
EOSINOPHIL NFR BLD: 2.3 %
GFR SERPLBLD CREATININE-BSD FMLA CKD-EPI: 46 ML/MIN/{1.73_M2}
GLUCOSE BLD-MCNC: 94 MG/DL (ref 70–99)
GLUCOSE BLD-MCNC: 94 MG/DL (ref 70–99)
GLUCOSE SERPL-MCNC: 101 MG/DL (ref 70–99)
HCT VFR BLD AUTO: 30.3 % (ref 36–48)
HGB BLD-MCNC: 9.8 G/DL (ref 12–16)
LYMPHOCYTES # BLD: 1.6 K/UL (ref 1–5.1)
LYMPHOCYTES NFR BLD: 19.2 %
MCH RBC QN AUTO: 32.2 PG (ref 26–34)
MCHC RBC AUTO-ENTMCNC: 32.2 G/DL (ref 31–36)
MCV RBC AUTO: 99.8 FL (ref 80–100)
MONOCYTES # BLD: 0.6 K/UL (ref 0–1.3)
MONOCYTES NFR BLD: 7.3 %
NEUTROPHILS # BLD: 5.9 K/UL (ref 1.7–7.7)
NEUTROPHILS NFR BLD: 70.3 %
PERFORMED ON: NORMAL
PERFORMED ON: NORMAL
PLATELET # BLD AUTO: 217 K/UL (ref 135–450)
PMV BLD AUTO: 9 FL (ref 5–10.5)
POTASSIUM SERPL-SCNC: 4.2 MMOL/L (ref 3.5–5.1)
RBC # BLD AUTO: 3.03 M/UL (ref 4–5.2)
SODIUM SERPL-SCNC: 131 MMOL/L (ref 136–145)
WBC # BLD AUTO: 8.5 K/UL (ref 4–11)

## 2023-05-26 PROCEDURE — 6360000002 HC RX W HCPCS: Performed by: INTERNAL MEDICINE

## 2023-05-26 PROCEDURE — 80048 BASIC METABOLIC PNL TOTAL CA: CPT

## 2023-05-26 PROCEDURE — 36415 COLL VENOUS BLD VENIPUNCTURE: CPT

## 2023-05-26 PROCEDURE — 85025 COMPLETE CBC W/AUTO DIFF WBC: CPT

## 2023-05-26 PROCEDURE — 94640 AIRWAY INHALATION TREATMENT: CPT

## 2023-05-26 PROCEDURE — 6370000000 HC RX 637 (ALT 250 FOR IP): Performed by: INTERNAL MEDICINE

## 2023-05-26 PROCEDURE — 6360000002 HC RX W HCPCS

## 2023-05-26 PROCEDURE — 2580000003 HC RX 258: Performed by: INTERNAL MEDICINE

## 2023-05-26 PROCEDURE — 6360000002 HC RX W HCPCS: Performed by: NURSE PRACTITIONER

## 2023-05-26 PROCEDURE — 94761 N-INVAS EAR/PLS OXIMETRY MLT: CPT

## 2023-05-26 PROCEDURE — 99232 SBSQ HOSP IP/OBS MODERATE 35: CPT | Performed by: INTERNAL MEDICINE

## 2023-05-26 PROCEDURE — 6370000000 HC RX 637 (ALT 250 FOR IP): Performed by: STUDENT IN AN ORGANIZED HEALTH CARE EDUCATION/TRAINING PROGRAM

## 2023-05-26 PROCEDURE — 2700000000 HC OXYGEN THERAPY PER DAY

## 2023-05-26 RX ORDER — VALSARTAN 40 MG/1
40 TABLET ORAL DAILY
Qty: 30 TABLET | Refills: 0 | Status: SHIPPED | OUTPATIENT
Start: 2023-05-26

## 2023-05-26 RX ORDER — BENZONATATE 200 MG/1
200 CAPSULE ORAL 3 TIMES DAILY PRN
Qty: 30 CAPSULE | Refills: 0 | Status: SHIPPED | OUTPATIENT
Start: 2023-05-26 | End: 2023-06-05

## 2023-05-26 RX ORDER — GUAIFENESIN 600 MG/1
600 TABLET, EXTENDED RELEASE ORAL 2 TIMES DAILY
Qty: 14 TABLET | Refills: 0 | Status: SHIPPED | OUTPATIENT
Start: 2023-05-26 | End: 2023-06-02

## 2023-05-26 RX ORDER — ASPIRIN 81 MG/1
81 TABLET ORAL DAILY
Qty: 30 TABLET | Refills: 0 | Status: SHIPPED | OUTPATIENT
Start: 2023-05-26

## 2023-05-26 RX ORDER — AZITHROMYCIN 250 MG/1
250 TABLET, FILM COATED ORAL DAILY
Qty: 3 TABLET | Refills: 0 | Status: SHIPPED | OUTPATIENT
Start: 2023-05-26 | End: 2023-05-29

## 2023-05-26 RX ADMIN — ONDANSETRON 4 MG: 2 INJECTION INTRAMUSCULAR; INTRAVENOUS at 09:47

## 2023-05-26 RX ADMIN — Medication 1 PUFF: at 08:12

## 2023-05-26 RX ADMIN — Medication 2 PUFF: at 08:13

## 2023-05-26 RX ADMIN — CETIRIZINE HYDROCHLORIDE 10 MG: 10 TABLET, FILM COATED ORAL at 08:02

## 2023-05-26 RX ADMIN — GUAIFENESIN 600 MG: 600 TABLET ORAL at 08:03

## 2023-05-26 RX ADMIN — DRONABINOL 5 MG: 5 CAPSULE ORAL at 09:02

## 2023-05-26 RX ADMIN — MULTIPLE VITAMINS W/ MINERALS TAB 1 TABLET: TAB at 08:03

## 2023-05-26 RX ADMIN — Medication 10 ML: at 08:10

## 2023-05-26 RX ADMIN — AZITHROMYCIN MONOHYDRATE 250 MG: 250 TABLET ORAL at 08:02

## 2023-05-26 RX ADMIN — METOCLOPRAMIDE 5 MG: 10 TABLET ORAL at 11:49

## 2023-05-26 RX ADMIN — METOCLOPRAMIDE 5 MG: 10 TABLET ORAL at 08:04

## 2023-05-26 RX ADMIN — Medication 1000 UNITS: at 08:04

## 2023-05-26 RX ADMIN — FUROSEMIDE 40 MG: 10 INJECTION, SOLUTION INTRAMUSCULAR; INTRAVENOUS at 08:04

## 2023-05-26 RX ADMIN — BUPROPION HYDROCHLORIDE 200 MG: 100 TABLET, EXTENDED RELEASE ORAL at 08:10

## 2023-05-26 RX ADMIN — VALSARTAN 40 MG: 80 TABLET, FILM COATED ORAL at 08:02

## 2023-05-26 RX ADMIN — ENOXAPARIN SODIUM 40 MG: 100 INJECTION SUBCUTANEOUS at 08:01

## 2023-05-26 RX ADMIN — METOPROLOL SUCCINATE 25 MG: 25 TABLET, EXTENDED RELEASE ORAL at 08:04

## 2023-05-26 RX ADMIN — LEVETIRACETAM 500 MG: 500 TABLET, FILM COATED ORAL at 08:03

## 2023-05-26 RX ADMIN — FLUTICASONE PROPIONATE 2 SPRAY: 50 SPRAY, METERED NASAL at 08:04

## 2023-05-26 ASSESSMENT — PAIN SCALES - GENERAL
PAINLEVEL_OUTOF10: 0
PAINLEVEL_OUTOF10: 0

## 2023-05-26 NOTE — DISCHARGE SUMMARY
V2.0  Discharge Summary    Name:  David Means /Age/Sex: 1960 (61 y.o. female)   Admit Date: 2023  Discharge Date: 23    MRN & CSN:  [id number] & [id number] Encounter Date and Time 23 9:41 AM EDT    Attending:  Georgie Sánchez MD Discharging Provider: Natalio Marsh, [facility] Course:     Brief HPI:   David Means is a 61 y.o. female with pmh of CHF (40% 2020), diabetes, HLD, Hyperthyroidism, developmental delay, sleep apnea who presents with Acute on chronic systolic (congestive) heart failure ([facility] [address] Brief Problem Based Course:   Acute exacerbation of HFrEF with Non-Ischemic Cardiomyopathy  - TTE from [address] [place] [address] on 5/3/2023 with EF of 30-35%  -  TTE showed moderate MR with EF of 35-40%   -  LHC showed normal coronaries. Severe volume overload. Acute decompensated HF.  - Hemoglobin A1c 5.5%, Lipid Panel with LDL at 38.   - Holding Atorvastatin for now. Restarted 1 week after discharge. - Referral to  heart failure clinic on discharge per family wishes  - Cardiology following. Their recommendations appreciated:               - Lasix holiday today given renal function. Back on home PO Lasix 40-60 mg daily tomorrow. - Continue Valsartan               - Continue on BB per Cardiology. - [facility]     Dsypnea  - Likely, 2/2 Acute Decompensated CHF, but could also be 2/2 to hx of Asthma   - Repeat CXR on 23 showed no significant change in mild pulmonary edema.   - On baseline 2L NC O2 at home. - Lasix holiday today, resume tomorrow AM.   - Albuterol, Dulera, and Singulair.   - Continue Mucinex PRN at discharge. - Continue Tessalon PRN for cough at discharge. - Complete Azithromycin pack after discharge. Malnutrition  - Chronic over 1 month 2/2 to decreased PO intake   - Received Marinol and Reglan with good symptomatic improvement. Developmental Disability  - Likely, at baseline today.    - Continue with

## 2023-05-26 NOTE — PLAN OF CARE
Problem: Discharge Planning  Goal: Discharge to home or other facility with appropriate resources  Outcome: Progressing     Problem: Safety - Adult  Goal: Free from fall injury  Outcome: Progressing     Problem: Chronic Conditions and Co-morbidities  Goal: Patient's chronic conditions and co-morbidity symptoms are monitored and maintained or improved  Outcome: Progressing     Problem: Pain  Goal: Verbalizes/displays adequate comfort level or baseline comfort level  Outcome: Progressing     Problem: Skin/Tissue Integrity  Goal: Absence of new skin breakdown  Description: 1. Monitor for areas of redness and/or skin breakdown  2. Assess vascular access sites hourly  3. Every 4-6 hours minimum:  Change oxygen saturation probe site  4. Every 4-6 hours:  If on nasal continuous positive airway pressure, respiratory therapy assess nares and determine need for appliance change or resting period.   Outcome: Progressing     Problem: ABCDS Injury Assessment  Goal: Absence of physical injury  Outcome: Progressing     Problem: Nutrition Deficit:  Goal: Optimize nutritional status  Outcome: Progressing

## 2023-05-26 NOTE — CARE COORDINATION
CASE MANAGEMENT DISCHARGE SUMMARY      Discharge to: home ([facility]) with [facility]     IMM given: (date) 5/26/23    Transportation: sister     Confirmed discharge plan with:patient/sister/RN/Eulalia with [facility]      Patient: yes     Family:  yes    Name:Duane Contact number:[phone]     RN, name: Donna Lemon    Note: Discharging nurse to complete DONOVAN, reconcile AVS, and place final copy with patient's discharge packet.

## 2023-05-26 NOTE — PROGRESS NOTES
[address] - Daily Progress/Follow-up Note      Admit Date:  5/18/2023    CHIEF COMPLAINT  Congestive heart failure, cardiomyopathy, mitral regurgitation      INTERVAL HISTORY:  Ms. Simran Kim continues to do better with gentle diuresis since admission. She is getting 40 mg IV Lasix twice daily. She is net approximately 3.5 L negative since admission. She reports improved dyspnea as well as orthopnea. Patient denies chest pain/heaviness/pressure, palpitations, edema, lightheadedness, syncope. REVIEW OF SYSTEMS  10 point ROS done and negative other than above      CARDIAC MEDICATIONS  Lasix 40 mg IV twice daily  Lipitor 10  Valsartan 40    Family, medical and social history reviewed and updated as necessary.     VITALS  /77   Pulse (!) 109   Temp 98.8 °F (37.1 °C)   Resp 20   Ht 5' 1\" (1.549 m)   Wt 145 lb 3.2 oz (65.9 kg)   SpO2 100%   BMI 27.44 kg/m²     Intake/Output Summary (Last 24 hours) at 5/23/2023 1014  Last data filed at 5/23/2023 6702  Gross per 24 hour   Intake 1145 ml   Output 1900 ml   Net -755 ml       TELEMETRY: No significant arrhythmias noted overnight    General appearance -lethargic but awake, cooperative, no distress, appears stated age  Head - Normocephalic, without obvious abnormality, atraumatic  Eyes - PERRL, conjunctiva/corneas clear  Nose - Nares normal, no drainage or sinus tenderness  Throat - Lips, mucosa, and tongue normal  Neck - Supple, symmetrical, trachea midline, no adenopathy, thyroid: not enlarged, symmetric, no tenderness/mass/nodules, no carotid bruit, +JVD  Lungs - diffuse rales up to mid lung fields bilaterally, respirations unlabored  Chest wall - No tenderness or deformity  Heart - Regular rate and rhythm, S1, S2 normal, no murmur, no rub or gallop  Abdomen - Soft, non-tender, bowel sounds active all four quadrants,  no masses, no organomegaly  Extremities - Extremities normal, atraumatic, no cyanosis or edema  Pulses - 2+ and symmetric upper
[facility] [place]      Name:  Barby Kirk /Age/Sex: 1960  (61 y.o. female)   MRN & CSN:  [id number] & [id number] Encounter Date/Time: 2023 8:27 AM EDT   Location:   PCP: Amairani Rosales, [address] Day: 6    Assessment and Recommendations   Barby Kirk is a 61 y.o. female with pmh of CHF (40% 2020), diabetes, HLD, Hyperthyroidism, developmental delay, sleep apnea who presents with Acute HFrEF (heart failure with reduced ejection fraction) ([facility]          Patient attempted to complete DUSTY this AM. Procedure cancelled as patient thought to be volume overloaded still. Plans to attempt tomorrow. Patient seen at bedside this PM. She is sitting in chair resting comfortably. Notes improvement in cough since yesterday. But, endorses that cough happens equally at night and in the morning. Continues to deny CP, abdominal pain, nausea, vomiting, or diarrhea. Cardiology following. Plan:   Acute exacerbation of HFrEF with Non-Ischemic Cardiomyopathy  - TTE from [address] [place] [address] on 5/3/2023 with EF of 30-35%  -  TTE showed moderate MR with EF of 35-40%   -  LHC showed normal coronaries. Severe volume overload. Acute decompensated HF.  - Hemoglobin A1c 5.5%, Lipid Panel with LDL at 38.   - Holding Atorvastatin for now. - Down 7 lbs since admission.   - Referral to  heart failure clinic on discharge per family wishes  - Cardiology following. Their recommendations appreciated:    - Continue IV Lasix    - Continue Valsartan    - Hold BB at this time. - DUSTY tomorrow. NPO tonight. - Fluid and Sodium Restricted Diet. Consider Ortega. - Daily weight and strict I/O's     Dsypnea  - Likely, 2/2 Acute Decompensated CHF, but could also be 2/2 to hx of Asthma   - On baseline NC O2 at home. - Continue with IV Lasix as above. - Albuterol, Dulera, and Singulair as scheduled.    - Continue nebulizer with 3% saline.   - Continue Mucinex given
[address] - Daily Progress/Follow-up Note      Admit Date:  5/18/2023    CHIEF COMPLAINT  Congestive heart failure, cardiomyopathy, mitral regurgitation      INTERVAL HISTORY:  Ms. Anna Griffith continues to do better with diuresis since admission. She is getting 40 mg IV Lasix twice daily with good urine output response. She is net approximately 5.3 L negative since admission. She reports improved dyspnea as well as orthopnea. Patient denies chest pain/heaviness/pressure, palpitations, edema, lightheadedness, syncope. REVIEW OF SYSTEMS  10 point ROS done and negative other than above      CARDIAC MEDICATIONS  Lasix 40 mg IV twice daily  Lipitor 10  Toprol-XL 25  Valsartan 40    Family, medical and social history reviewed and updated as necessary.     VITALS  BP (!) 122/94   Pulse 81   Temp 98.1 °F (36.7 °C)   Resp 18   Ht 5' 1\" (1.549 m)   Wt 143 lb 12.8 oz (65.2 kg)   SpO2 100%   BMI 27.17 kg/m²     Intake/Output Summary (Last 24 hours) at 5/26/2023 1241  Last data filed at 5/26/2023 0000  Gross per 24 hour   Intake 840 ml   Output 900 ml   Net -60 ml       TELEMETRY: No significant arrhythmias noted overnight    General appearance - awake and alert, cooperative, no distress, appears stated age  Head - Normocephalic, without obvious abnormality, atraumatic  Eyes - PERRL, conjunctiva/corneas clear  Nose - Nares normal, no drainage or sinus tenderness  Throat - Lips, mucosa, and tongue normal  Neck - Supple, symmetrical, trachea midline, no adenopathy, thyroid: not enlarged, symmetric, no tenderness/mass/nodules, no carotid bruit, +JVD  Lungs - diffuse rales up to mid lung fields bilaterally, respirations unlabored  Chest wall - No tenderness or deformity  Heart - Regular rate and rhythm, S1, S2 normal, no murmur, no rub or gallop  Abdomen - Soft, non-tender, bowel sounds active all four quadrants,  no masses, no organomegaly  Extremities - Extremities normal, atraumatic, no cyanosis or
CMU notified writer of rhythm change- trigeminy. Pt's vitals stable. Order for STAT EKG placed. Pt alert and asymptomatic. Cardiology made aware.
Comprehensive Nutrition Assessment    Type and Reason for Visit:  Initial, RD Nutrition Re-Screen/LOS    Nutrition Recommendations/Plan:   Continue regular 2 g Na; 1800 ml fl restricted diet  Monitor nutrition adequacy, pertinent labs, bowel habits, wt changes, and clinical progress     Malnutrition Assessment:  Malnutrition Status:  Insufficient data (05/25/23 1323)      Nutrition Assessment:    LOS: 62 yo F w PMH CHF, DM, developmental delay admitted with CHF. S/p LHC and TTE 5/19. Pt on regular 2 g Na diet with majority % PO intakes. Pt reports having an appetite and being able to eat most of her meals. No family present at time of visit. Per nurse, pt has been eating well. Encouraged continued good PO intakes. Will monitor. Nutrition Related Findings:    BG  x24 hrs. -5.3 L since admit. BM 5/23. +1 generalized, BLE edema. Wound Type: None       Current Nutrition Intake & Therapies:    Average Meal Intake: %, 51-75%, 26-50%  Average Supplements Intake: None Ordered  ADULT DIET; Regular; Low Sodium (2 gm); 1800 ml    Anthropometric Measures:  Height: 5' 1\" (154.9 cm)  Ideal Body Weight (IBW): 105 lbs (48 kg)       Current Body Weight: 144 lb 2.9 oz (65.4 kg),   IBW. Weight Source: Standing Scale  Current BMI (kg/m2): 27.3  Usual Body Weight: 147 lb 14.9 oz (67.1 kg) (4/24)  % Weight Change (Calculated): -2.5  Weight Adjustment For: No Adjustment                 BMI Categories: Overweight (BMI 25.0-29. 9)    Estimated Daily Nutrient Needs:  Energy Requirements Based On: Kcal/kg (25-30)  Weight Used for Energy Requirements: Ideal  Energy (kcal/day): 5735-7419 kcals  Weight Used for Protein Requirements: Ideal (1-1.2)  Protein (g/day): 48-58 g  Method Used for Fluid Requirements: 1 ml/kcal  Fluid (ml/day): 7343-0074 ml    Nutrition Diagnosis:   Inadequate oral intake related to inadequate protein-energy intake as evidenced by weight loss, intake 26-50%, intake 51-75%    Nutrition Interventions:
[facility]      Name:  Harvey Singh /Age/Sex: 1960  (61 y.o. female)   MRN & CSN:  [id number] & [id number] Encounter Date/Time: 2023 8:27 AM EDT   Location:   PCP: Soy Canas MD       Hospital Day: 5    Assessment and Recommendations   Harvey Singh is a 61 y.o. female with pmh of CHF (40% 2020), diabetes, HLD, Hyperthyroidism, developmental delay, sleep apnea who presents with Acute HFrEF (heart failure with reduced ejection fraction) ([facility] [address]        Patient seen at bedside with sisters present. No acute events reported overnight. Patient having more intense productive coughing this AM compared to yesterday. Family notes that patient prefers to hold breath in while attempting to prevent coughing. Educated patient on therapies for productive cough, which she was agreeable to try today. Continues to deny CP, abdominal pain, nausea, vomiting, or diarrhea. Cardiology and GI following. Plan:   Acute exacerbation of HFrEF with Non-Ischemic Cardiomyopathy  - TTE from [address] [place] [address] on 5/3/2023 with EF of 30-35%  -  TTE showed moderate MR with EF of 35-40%   -  LHC showed normal coronaries. Severe volume overload. Acute decompensated HF.  - Hemoglobin A1c 5.5%, Lipid Panel with LDL at 38.   - Holding Atorvastatin for now. - Referral to  heart failure clinic on discharge per family wishes  - Cardiology following. Their recommendations appreciated:    - Continue IV Lasix    - Continue Valsartan    - Hold BB at this time. - DUSTY tomorrow. NPO tonight. - Fluid and Sodium Restricted Diet. Consider Ortega. - Daily weight and strict I/O's     Dsypnea  - Likely, 2/2 Acute Decompensated CHF, but could also be 2/2 to hx of Asthma   - On baseline NC O2 at home. - Continue with IV Lasix as above. - Albuterol, Dulera, and Singulair as scheduled.    - Added nebulizer with 3% saline.   - Added Mucinex given nature of productive cough   -
[address] - Daily Progress/Follow-up Note      Admit Date:  5/18/2023    CHIEF COMPLAINT  Congestive heart failure, cardiomyopathy, mitral regurgitation      INTERVAL HISTORY:  Ms. Simran Kim continues to do better with gentle diuresis since admission. She is getting 40 mg IV Lasix twice daily. She is net approximately 4.5 L negative since admission. She reports improved dyspnea as well as orthopnea. She is complaining of new cough. Patient denies chest pain/heaviness/pressure, palpitations, edema, lightheadedness, syncope. REVIEW OF SYSTEMS  10 point ROS done and negative other than above      CARDIAC MEDICATIONS  Lasix 40 mg IV twice daily  Lipitor 10  Valsartan 40    Family, medical and social history reviewed and updated as necessary.     VITALS  /86   Pulse 84   Temp 96.8 °F (36 °C) (Oral)   Resp 18   Ht 5' 1\" (1.549 m)   Wt 144 lb (65.3 kg)   SpO2 97%   BMI 27.21 kg/m²     Intake/Output Summary (Last 24 hours) at 5/24/2023 1136  Last data filed at 5/23/2023 2000  Gross per 24 hour   Intake 1015 ml   Output 2000 ml   Net -985 ml       TELEMETRY: No significant arrhythmias noted overnight    General appearance -lethargic but awake, cooperative, no distress, appears stated age  Head - Normocephalic, without obvious abnormality, atraumatic  Eyes - PERRL, conjunctiva/corneas clear  Nose - Nares normal, no drainage or sinus tenderness  Throat - Lips, mucosa, and tongue normal  Neck - Supple, symmetrical, trachea midline, no adenopathy, thyroid: not enlarged, symmetric, no tenderness/mass/nodules, no carotid bruit, +JVD  Lungs - diffuse rales up to mid lung fields bilaterally, respirations unlabored  Chest wall - No tenderness or deformity  Heart - Regular rate and rhythm, S1, S2 normal, no murmur, no rub or gallop  Abdomen - Soft, non-tender, bowel sounds active all four quadrants,  no masses, no organomegaly  Extremities - Extremities normal, atraumatic, no cyanosis or edema  Pulses
[facility] [place]      Name:  Serge Pride /Age/Sex: 1960  (61 y.o. female)   MRN & CSN:  [id number] & [id number] Encounter Date/Time: 2023 8:27 AM EDT   Location:   PCP: Enrique Mancia, [address] Day: 8    Assessment and Recommendations   Serge Pride is a 61 y.o. female with pmh of CHF (40% 2020), diabetes, HLD, Hyperthyroidism, developmental delay, sleep apnea who presents with Acute on chronic systolic (congestive) heart failure ([facility] [address]            Patient seen at bedside this AM without family present. No acute events reported overnight, aside from hypotension 1x at 90/47. DUSTY also cancelled yesterday likely secondary to volume status. Notes that while she still has persistent cough, she has noted improvement since yesterday. Continues to be on IV diuretics without significant weight change. Continues to deny CP, abdominal pain, nausea, vomiting, or diarrhea. Plan:   Acute exacerbation of HFrEF with Non-Ischemic Cardiomyopathy  - TTE from [address] [place] [address] on 5/3/2023 with EF of 30-35%  -  TTE showed moderate MR with EF of 35-40%   -  LHC showed normal coronaries. Severe volume overload. Acute decompensated HF.  - Hemoglobin A1c 5.5%, Lipid Panel with LDL at 38.   - Holding Atorvastatin for now. - Down 8 lbs since admission.   - Referral to  heart failure clinic on discharge per family wishes  - Cardiology following. Their recommendations appreciated:    - Continue IV Lasix    - Continue Valsartan    - Restarted on BB per Cardiology. - DUSTY cancelled yesterday again. (Unsure if rescheduled)   - Fluid and Sodium Restricted Diet. Consider Ortega. - Daily weight and strict I/O's     Dsypnea  - Likely, 2/2 Acute Decompensated CHF, but could also be 2/2 to hx of Asthma   - On baseline 2L NC O2 at home. - Continue with IV Lasix as above. - Albuterol, Dulera, and Singulair as scheduled.    - Continue nebulizer with 3%
NP paged, \"pt is complaining of a dry cough. Is there anyway she can have cough syrup ordered? thank you! \"
Patient's EF (Ejection Fraction) is less than 40%    Heart Failure Medications:  Diuretics[de-identified] Furosemide    (One of the following REQUIRED for EF </= 40%/SYSTOLIC FAILURE but MAY be used in EF% >40%/DIASTOLIC FAILURE)        ACE[de-identified] None        ARB[de-identified] Valsartan         ARNI[de-identified] None    (Beta Blockers)  NON- Evidenced Based Beta Blocker (for EF% >40%/DIASTOLIC FAILURE): None    Evidenced Based Beta Blocker::(REQUIRED for EF% <40%/SYSTOLIC FAILURE) Metoprolol SUCCinate- Toprol XL  . .................................................................................................................................................. Healthy Weight Tracking - BMI + Meds 5/21/2023 5/21/2023 5/22/2023 5/23/2023 5/24/2023 5/25/2023 5/26/2023   Weight 160 lb 15 oz 150 lb 149 lb 4.8 oz 145 lb 3.2 oz 144 lb 144 lb 3.2 oz 143 lb 12.8 oz   Height - - - - - - -   Body Mass Index 30.41 kg/m2 28.34 kg/m2 28.21 kg/m2 27.44 kg/m2 27.21 kg/m2 27.25 kg/m2 27.17 kg/m2   Some recent data might be hidden         Patient's weights and intake/output reviewed: Yes    Daily Weight log at bedside and being used: \"yes    Patient's Last Weight: 144   lbs obtained by standing scale. Intake/Output Summary (Last 24 hours) at 5/26/2023 0606  Last data filed at 5/26/2023 0000  Gross per 24 hour   Intake 1200 ml   Output 1150 ml   Net 50 ml       Education Booklet Provided: yes    Comorbidities Reviewed Yes    Patient has a past medical history of Asthma, Blind left eye, Cardiomyopathy ([address] CHF (congestive heart failure) ([address] COVID-19, Depression, Diabetes, Dizziness and giddiness, GALLAGHER (dyspnea on exertion), Fractures, Hyperthyroidism, Hypoxemia, Mental retardation, Mitral valve disorder, Mixed hyperlipidemia, Profound impairment, one eye, impairment level not further specified, Sleep apnea, Toxic uninodular goiter, and Vitamin D deficiency.      >>For CHF and Comorbidity documentation on Education Time and Topics, please see Education
Patient's EF (Ejection Fraction) is less than 40%    Patient has a past medical history of Asthma, Blind left eye, Cardiomyopathy ([address] CHF (congestive heart failure) ([address] COVID-19, Depression, Diabetes, Dizziness and giddiness, GALLAGHER (dyspnea on exertion), Fractures, Hyperthyroidism, Hypoxemia, Mental retardation, Mitral valve disorder, Mixed hyperlipidemia, Profound impairment, one eye, impairment level not further specified, Sleep apnea, Toxic uninodular goiter, and Vitamin D deficiency. Comorbidities reviewed and education provided. Patient and family's stated goal of care: reduce shortness of breath prior to discharge    Patient's current functional capacity:  Slight limitation of physical activity. Comfortable at rest. Ordinary physical activity results in fatigue, palpitation, dyspnea. Pt sitting in bed at this time on 2L O2. Pt denies shortness of breath. Pt with nonpitting lower extremity edema. Patient's last three weights and intake/output reviewed:    Patient Vitals for the past 96 hrs (Last 3 readings):   Weight   05/22/23 0352 149 lb 4.8 oz (67.7 kg)   05/21/23 1022 150 lb (68 kg)   05/21/23 0518 160 lb 15 oz (73 kg)       Intake/Output Summary (Last 24 hours) at 5/22/2023 0420  Last data filed at 5/21/2023 2000  Gross per 24 hour   Intake 240 ml   Output 1750 ml   Net -1510 ml     Patient provided with education on CHF signs/symptoms, causes, discharge medications, daily weights, low sodium diet, activity, and follow-up. Notified patient to call the doctor post discharge if patient experiences shortness of breath, chest pain, swelling, cough, or weight gain of three pounds in a day/five pounds in a week. Also notified patient to call the doctor with dizziness, increased fatigue, decreased or difficulty urinating. Pt with no evidence of learning. No additional questions at this time.     Education Time: 5 Minutes
Per pt chart, pt never a smoker. No smoking cessation education given.
Physical Therapy  Facility/Department: [facility] A2 CARD TELEMETRY  Physical Therapy Initial Assessment/Treatment    Name: Lyndon Dawn  : 1960  MRN: [id number]  Date of Service: 2023    Discharge Recommendations:  24 hour supervision or assist   PT Equipment Recommendations  Equipment Needed: No      Patient Diagnosis(es): The primary encounter diagnosis was Exertional dyspnea. Diagnoses of Dyspnea and respiratory abnormalities, Congestive heart failure, unspecified HF chronicity, unspecified heart failure type ([address] Elevated troponin, and Elevated brain natriuretic peptide (BNP) level were also pertinent to this visit. Past Medical History:  has a past medical history of Asthma, Blind left eye, Cardiomyopathy ([address] CHF (congestive heart failure) ([address] COVID-19, Depression, Diabetes, Dizziness and giddiness, GALLAGHER (dyspnea on exertion), Fractures, Hyperthyroidism, Hypoxemia, Mental retardation, Mitral valve disorder, Mixed hyperlipidemia, Profound impairment, one eye, impairment level not further specified, Sleep apnea, Toxic uninodular goiter, and Vitamin D deficiency. Past Surgical History:  has a past surgical history that includes Hysterectomy; Cholecystectomy; Diagnostic Cardiac Cath Lab Procedure; and Colonoscopy. Assessment   Body Structures, Functions, Activity Limitations Requiring Skilled Therapeutic Intervention: Decreased functional mobility ; Decreased endurance;Decreased balance;Decreased strength;Decreased safe awareness  Assessment: Pt [facility] to [facility] with acute on chronic heart failure. PTA, pt lives in group home and has assist for ADLs and performs functional mobility without AD. Pt currently functioning mildly below baseline level requiring up to min(A) for ambulation in hallway due to several LOB. Pt would benefit from continued skilled PT to address current deficits.  Recommend home with 24hr supervision upon d/c  Treatment Diagnosis: impaired functional mobility  Therapy
Physical Therapy  Order received, chart reviewed, RN requesting hold due to vtach when ambulating with OT, pt endorsing \"heart runs\", will follow up as pt condition and schedule allow  No charge  Thank you  Bella Stanford, PT
Physician Progress Note      Nancy Rojas  CSN #:                  [id number]  :                       1960  ADMIT DATE:       2023 12:45 PM  [address] [place] DATE:  RESPONDING  PROVIDER #:        Mary Garcia MD          QUERY TEXT:    Pt admitted with acute on chronic HFrEF secondary to nonischemic   cardiomyopathy. Pt noted to also have HTN. If possible, please document in   progress notes and discharge summary the etiology of CHF, if able to be   determined. The medical record reflects the following:  Risk Factors: CHF, nonischemic cardiomyopathy, HTN  Clinical Indicators:  PN -  \" Heart failure with reduced ejection fraction. Secondary to nonischemic   cardiomyopathy. Previous echocardiogram from 5/3/2023 demonstrated EF of 30   to 35%. Heart failure order set placed. Will diurese with Lasix 20 mg IV   twice daily. Consult cardiology. Otherwise patient will follow-up with her   primary cardiologist on discharge. Monitor strict I/O's. Blood pressure soft   this AM.  We will hold this morning diuretic. Plan for DUSTY to evaluate for   mitral regurg. Hypertension. Continue ARB and hold BB due to hypotension. \"  Treatment: IV Lasix, cardiology consult, ARB, ECHO, serial labs, and   supportive care    Thank you,  Eugenie Morales, RN, BSN, CRCR  Options provided:  -- CHF due to Hypertensive Heart Disease and nonischemic cardiomyopathy  -- CHF not due to Hypertension but due to nonischemic cardiomyopathy  -- Other - I will add my own diagnosis  -- Disagree - Not applicable / Not valid  -- Disagree - Clinically unable to determine / Unknown  -- Refer to Clinical Documentation Reviewer    PROVIDER RESPONSE TEXT:    CHF due to hypertensive heart disease    Query created by: Kelley Arguello on 2023 8:55 AM      Electronically signed by:  Mary Garcia MD 2023 2:59 PM
Progress Note    Patient Boris Torres  MRN: [id number]  YOB: 1960 Age: 61 y.o. Sex: female  Room: [address] [place] [address]       Admitting Physician: No admitting provider for patient encounter. Date of Admission: 5/18/2023 12:45 PM   Primary Care Physician: MICHELL Tena     Subjective:  Jess Carlson Mt was seen and examined. We are following for abn LFTs. -- No acute events overnight  -- Overall feeling well this afternoon  -- Denies abdominal pain or nausea/vomiting     ROS:  Constitutional: Denies fever, no change in appetite  Respiratory: Denies cough or shortness of breath  Cardiovascular: Denies chest pain or edema    Objective:  Vital Signs:   Vitals:    05/22/23 0758   BP: (!) 125/92   Pulse: 100   Resp: 20   Temp: 97.7 °F (36.5 °C)   SpO2: 95%         Physical Exam:  Constitutional: Alert and oriented x 4. No acute distress. Respiratory: Respirations nonlabored, no crepitus  GI: Abdomen nondistended, soft, and nontender. Neurological: No focal deficits noted. No asterixis.     Intake/Output:    Intake/Output Summary (Last 24 hours) at 5/22/2023 1005  Last data filed at 5/22/2023 0857  Gross per 24 hour   Intake 240 ml   Output 1400 ml   Net -1160 ml        Current Medications:  Current Facility-Administered Medications   Medication Dose Route Frequency Provider Last Rate Last Admin    furosemide (LASIX) injection 40 mg  40 mg IntraVENous BID LILIA Loja - CNP   40 mg at 05/22/23 0955    dronabinol (MARINOL) capsule 5 mg  5 mg Oral BID Jocelyne Monroy DO   5 mg at 05/21/23 1239    0.9 % sodium chloride infusion   IntraVENous PRN Jean Paul Evans MD        valsartan (DIOVAN) tablet 40 mg  40 mg Oral Daily Jean Paul Evans MD   40 mg at 05/22/23 0955    sodium chloride flush 0.9 % injection 5-40 mL  5-40 mL IntraVENous 2 times per day Jean Paul Evans MD   10 mL at 05/21/23 2034    sodium chloride flush 0.9 % injection 5-40 mL  5-40 mL IntraVENous PRN Jean Paul Evans MD        0.9 % sodium chloride
Pt transported to cath lab at this time for DUSTY. Consent obtained and sent with pt. [phone] [place] notified.
[address]   Daily Progress Note      Admit Date:  5/18/2023    Reason for follow up visit: CHF    CC: \"I feel better today. I think I'm breathing better. \"    62 y/o female with PMH significant for asthma, COPD on chronic 2L O2,diabetes mellitus, sleep apnea, cardiomyopathy, CHF who lives in a group home and  was admitted to [facility] [place] after presenting with SOB and CHF. She has been diuresed and underwent L and R heart cath given her cardiomyopathy and demonstrated moderate MR. Plan is for DUSTY to further evaluate mitral valve tomorrow (5/23). Interval History:  Pt. seen and examined; records reviewed  BP stable  Net diuresis -2.5L; remains on IV lasix  Remains on O2 @ 2L  + increased cough this AM (nonproductive)  + SOB improving  Denies chest pain    Subjective:  Pt with no acute overnight cardiac events. Denies chest pain, palpitations or dizziness  + SOB improving; + nonproductive cough  Poor historian    Objective:   BP (!) 125/92   Pulse 100   Temp 97.7 °F (36.5 °C) (Oral)   Resp 20   Ht 5' 1\" (1.549 m)   Wt 149 lb 4.8 oz (67.7 kg)   SpO2 95%   BMI 28.21 kg/m²     Intake/Output Summary (Last 24 hours) at 5/22/2023 0857  Last data filed at 5/22/2023 0847  Gross per 24 hour   Intake 240 ml   Output 2050 ml   Net -1810 ml     Wt Readings from Last 3 Encounters:   05/22/23 149 lb 4.8 oz (67.7 kg)   04/10/23 151 lb (68.5 kg)   10/21/22 151 lb (68.5 kg)       Physical Exam:  General: In no acute distress. Awake, alert, and oriented X3. Resting in bed  HEENT: Sclerae anicteric. Normocephalic  Skin:  Warm and dry. No new appearing rashes or lesions. Neck:  Supple. No JVD or carotid bruit appreciated. Chest: Lungs with decreased breath sounds bilaterally. No wheezes/rhonchi/rales  Cardiovascular:  RRR. Normal S1 and S2. I/VI systolic murmur  Abdomen:  soft, nontender, nondistended, +bowel sounds. Extremities:  No LE edema. No clubbing or cyanosis. 2+ bilateral radial/carotid/DP pulses.  Cap refill
Occupational Therapy Plan  Times Per Week: 1x only     Restrictions  Restrictions/Precautions  Restrictions/Precautions: General Precautions, Up as Tolerated  Position Activity Restriction  Other position/activity restrictions: Tele; 2L of O2    Subjective   General  Chart Reviewed: Yes  Patient assessed for rehabilitation services?: Yes  Family / Caregiver Present: No  Diagnosis: CHF  Subjective  Subjective: Pt in chair, agreeable to OT evaluation. General Comment  Comments: RN ngozi for therapy     Social/Functional History  Social/Functional History  Lives With: Other (comment) (Group Home)  Type of Home: House  Home Layout: Able to Live on Main level with bedroom/bathroom, Multi-level  Home Access: Ramped entrance  Bathroom Shower/Tub: Walk-in shower, Shower chair with back  Bathroom Toilet: [address] [place] [address] 83: Grab bars around toilet  Home Equipment: Mary Shari (2L at baseline)  ADL Assistance: [address] [place] [address] Avenue: Needs assistance (\"Margie\" who runs the group home assists with all IADLs)  Ambulation Assistance: Independent (pt states she normally uses the w/c for mobility in home; but able to walk if needed)  Transfer Assistance: [address] [place] [address]: [employer]  Additional Comments: Pt is a questionable historian. Pt states she normally uses the wheelchair, but states she has been walking the halls with staff. Objective   Pulse: (!) 103  Heart Rate Source: Monitor  BP: (!) 115/91  BP Location: Right upper arm  BP Method: Automatic  Patient Position: Sitting  MAP (Calculated): 99  Respirations: 20  SpO2: 100 %  O2 Device: Nasal cannula             Safety Devices  Type of Devices: All fall risk precautions in place;Call light within reach; Chair alarm in place; Heels elevated for pressure relief;Left in bed;Left in chair;Patient at risk for falls  Bed Mobility Training  Bed Mobility Training: No (pt in chair at start and end of session)  Balance  Sitting:
rest breaks between last two bouts of amb))  Assistive Device: Gait belt; Other (comment) (using std. walker during 1st bout, without AD during 2nd bout)    PT Exercises  Exercise Treatment: x 15 BLE: Ankle pumps. x 10 BLE: LAQ, marching. Safety Devices  Type of Devices: All fall risk precautions in place;Call light within reach; Chair alarm in place; Heels elevated for pressure relief;Left in chair;Patient at risk for falls;Gait belt;Nurse notified       Goals  Short Term Goals  Time Frame for Short Term Goals: 7 days (5/31/23) unless otherwise stated  Short Term Goal 1: Pt will perform bed mobility with supervision - MET 5/25/23 for scooting  Short Term Goal 2: Pt will ambulate 150 ft with SBA with no LOB and LRAD - MET 5/25/23  Short Term Goal 3: Pt will perform 10 reps of BLE exercises by 5/26/23 - MET 5/25/23  Patient Goals   Patient Goals : \"to walk in hallway\"--5/24 GOAL MET    Education  Patient Education  Education Given To: Patient  Education Provided: Role of Therapy;Plan of Care;Transfer Training;Home Exercise Program;Precautions; Equipment; Fall Prevention Strategies  Education Method: Demonstration;Verbal  Barriers to Learning: Cognition  Education Outcome: Verbalized understanding;Demonstrated understanding    Attempted Therapy Heart Failure Education this date. Pt inappropriate for education at this time due to :  [x]Cognition                             []Medical Status        [x]Readiness to learn              []Refusal   []Language barrier                []Decreased vision/hearing    []No family present     CHF education N/A - Pt is a group home resident at baseline; group home staff manages diet and pt's medications/other medical needs. Should this change during treatment, education will be initiated. If family/pt's support system is present at subsequent therapy session, will attempt to initiate education.       Therapy Time   Individual Concurrent Group Co-treatment   Time In 6198
(36.4 °C)  96.8 °F (36 °C)    TempSrc: Axillary  Oral    SpO2: 95%   97%   Weight:  144 lb (65.3 kg)     Height:             Physical Exam:      General appearance: No apparent distress, appears stated age and cooperative. HEENT: Normal cephalic, atraumatic without obvious deformity. Conjunctivae/corneas clear. Neck: No jugular venous distention. Respiratory:  Increased respiratory effort with improving productive cough. Mild rales bilaterally without Wheezes/Rhonchi. Cardiovascular: Regular rate and rhythm without rubs or gallops. Mild systolic murmur heard. Abdomen: Soft, non-tender, non-distended with normal bowel sounds. Musculoskeletal: No clubbing, cyanosis or edema bilaterally. Full range of motion without deformity. Skin: Skin color, texture, turgor normal.  No rashes or lesions. Neurologic:  Baseline developmental delay.  Occasional mumbled speech  Psychiatric: Mood and affect normal and congruent      Medications:   Medications:    sodium chloride flush  5-40 mL IntraVENous 2 times per day    guaiFENesin  600 mg Oral BID    furosemide  40 mg IntraVENous BID    dronabinol  5 mg Oral BID    valsartan  40 mg Oral Daily    sodium chloride flush  5-40 mL IntraVENous 2 times per day    sodium chloride flush  5-40 mL IntraVENous 2 times per day    enoxaparin  40 mg SubCUTAneous Daily    ARIPiprazole  15 mg Oral Nightly    [Held by provider] atorvastatin  10 mg Oral Daily    buPROPion  200 mg Oral Daily    clomiPRAMINE  150 mg Oral Nightly    fluticasone  2 spray Each Nostril Daily    levETIRAcetam  500 mg Oral BID    cetirizine  10 mg Oral Daily    [Held by provider] magnesium oxide  400 mg Oral Daily    metoclopramide  5 mg Oral 4x Daily AC & HS    montelukast  10 mg Oral Nightly    therapeutic multivitamin-minerals  1 tablet Oral Daily    Vitamin D  1,000 Units Oral Daily    insulin lispro  0-4 Units SubCUTAneous TID WC    insulin lispro  0-4 Units SubCUTAneous Nightly    mometasone-formoterol  2 puff
coronaries  Frequent PVCs - no sustained arrhythmias     - Continue IV Lasix 40mg twice daily with strict I's/O's and daily renal panel and goal net 1.5-2 L negative daily  - Continue low-dose metoprolol  - Keep electrolytes corrected with active diuresis  - Continue low-dose valsartan, tolerating well  - DUSTY later this admission versus outpatient once she is more euvolemic to assess MR severity and etiology  - Referral to  heart failure clinic on discharge per family wishes      All questions and concerns were addressed to the patient/family. Alternatives to my treatment as well as risks and benefits of proposed treatment were discussed and understood by patient/family. Thank you for the consult, please call with questions.     Sarahi Chacon MD, [facility] [place] [address] Cardiology  [facility]  [phone] (c)  5/25/2023       Inadvertent computerized transcription errors may be present

## 2023-05-26 NOTE — DISCHARGE SUMMARY
[facility] care here to  patient  IV and tele removed  Medications picked up from pharmacy that were not over the counter  Pt DC to front lobby

## 2023-05-30 ENCOUNTER — TELEPHONE (OUTPATIENT)
Dept: CARDIOLOGY | Age: 63
End: 2023-05-30

## 2023-05-30 DIAGNOSIS — I50.23 ACUTE ON CHRONIC SYSTOLIC (CONGESTIVE) HEART FAILURE (HCC): ICD-10-CM

## 2023-05-30 DIAGNOSIS — I51.9 SYSTOLIC DYSFUNCTION, LEFT VENTRICLE: Primary | ICD-10-CM

## 2023-05-30 DIAGNOSIS — I34.0 NONRHEUMATIC MITRAL VALVE REGURGITATION: ICD-10-CM

## 2023-05-30 DIAGNOSIS — I42.8 OTHER CARDIOMYOPATHY (HCC): ICD-10-CM

## 2023-06-05 NOTE — TELEPHONE ENCOUNTER
Spoke with Trinidad Dhillon (nurse) per hippa. Relayed procedure direction and medication directions regarding what pt needs to hold and take. Belen Reeves and will call back if questions needed.

## 2023-06-09 RX ORDER — LORAZEPAM 0.5 MG/1
0.5 TABLET ORAL
OUTPATIENT
Start: 2023-06-09 | End: 2023-06-10

## 2023-06-09 RX ORDER — SODIUM CHLORIDE 0.9 % (FLUSH) 0.9 %
5-40 SYRINGE (ML) INJECTION EVERY 12 HOURS SCHEDULED
OUTPATIENT
Start: 2023-06-09

## 2023-06-09 RX ORDER — SODIUM CHLORIDE 9 MG/ML
INJECTION, SOLUTION INTRAVENOUS PRN
OUTPATIENT
Start: 2023-06-09

## 2023-06-09 RX ORDER — ONDANSETRON 2 MG/ML
4 INJECTION INTRAMUSCULAR; INTRAVENOUS EVERY 6 HOURS PRN
OUTPATIENT
Start: 2023-06-09

## 2023-06-09 RX ORDER — SODIUM CHLORIDE 0.9 % (FLUSH) 0.9 %
5-40 SYRINGE (ML) INJECTION PRN
OUTPATIENT
Start: 2023-06-09

## 2023-06-09 RX ORDER — ASPIRIN 325 MG
325 TABLET ORAL ONCE
OUTPATIENT
Start: 2023-06-09 | End: 2023-06-09

## 2023-06-16 ENCOUNTER — HOSPITAL ENCOUNTER (OUTPATIENT)
Dept: CARDIAC CATH/INVASIVE PROCEDURES | Age: 63
Discharge: HOME | End: 2023-06-16

## 2023-07-07 ENCOUNTER — HOSPITAL ENCOUNTER (OUTPATIENT)
Dept: VASCULAR LAB | Age: 63
Discharge: HOME | End: 2023-07-07
Attending: PSYCHIATRY & NEUROLOGY
Payer: MEDICARE

## 2023-07-07 ENCOUNTER — HOSPITAL ENCOUNTER (OUTPATIENT)
Dept: CT IMAGING | Age: 63
Discharge: HOME | End: 2023-07-07
Attending: PSYCHIATRY & NEUROLOGY
Payer: MEDICARE

## 2023-07-07 ENCOUNTER — HOSPITAL ENCOUNTER (OUTPATIENT)
Age: 63
Discharge: HOME | End: 2023-07-07
Attending: PSYCHIATRY & NEUROLOGY
Payer: MEDICARE

## 2023-07-07 DIAGNOSIS — R56.9 SEIZURES (HCC): ICD-10-CM

## 2023-07-07 DIAGNOSIS — D32.9 MENINGIOMA (HCC): ICD-10-CM

## 2023-07-07 DIAGNOSIS — Z86.73 HISTORY OF STROKE: ICD-10-CM

## 2023-07-07 DIAGNOSIS — I65.29 OCCLUSION AND STENOSIS OF UNSPECIFIED CAROTID ARTERY: ICD-10-CM

## 2023-07-07 LAB
ALBUMIN SERPL-MCNC: 3.6 G/DL (ref 3.4–5)
ANION GAP SERPL CALCULATED.3IONS-SCNC: 20 MMOL/L (ref 3–16)
BUN SERPL-MCNC: 11 MG/DL (ref 7–20)
CALCIUM SERPL-MCNC: 9.9 MG/DL (ref 8.3–10.6)
CHLORIDE SERPL-SCNC: 97 MMOL/L (ref 99–110)
CO2 SERPL-SCNC: 19 MMOL/L (ref 21–32)
CREAT SERPL-MCNC: 0.9 MG/DL (ref 0.6–1.2)
GFR SERPLBLD CREATININE-BSD FMLA CKD-EPI: >60 ML/MIN/{1.73_M2}
GLUCOSE SERPL-MCNC: 97 MG/DL (ref 70–99)
MAGNESIUM SERPL-MCNC: 2.8 MG/DL (ref 1.8–2.4)
PHOSPHATE SERPL-MCNC: 4.3 MG/DL (ref 2.5–4.9)
POTASSIUM SERPL-SCNC: 5.4 MMOL/L (ref 3.5–5.1)
SODIUM SERPL-SCNC: 136 MMOL/L (ref 136–145)

## 2023-07-07 PROCEDURE — 70450 CT HEAD/BRAIN W/O DYE: CPT

## 2023-07-07 PROCEDURE — 36415 COLL VENOUS BLD VENIPUNCTURE: CPT

## 2023-07-07 PROCEDURE — 80069 RENAL FUNCTION PANEL: CPT

## 2023-07-07 PROCEDURE — 93880 EXTRACRANIAL BILAT STUDY: CPT

## 2023-07-07 PROCEDURE — 83735 ASSAY OF MAGNESIUM: CPT

## 2023-07-14 ENCOUNTER — HOSPITAL ENCOUNTER (OUTPATIENT)
Age: 63
Discharge: HOME | End: 2023-07-14
Payer: MEDICARE

## 2023-07-14 LAB
ALBUMIN SERPL-MCNC: 3.6 G/DL (ref 3.4–5)
ANION GAP SERPL CALCULATED.3IONS-SCNC: 10 MMOL/L (ref 3–16)
BUN SERPL-MCNC: 13 MG/DL (ref 7–20)
CALCIUM SERPL-MCNC: 9.2 MG/DL (ref 8.3–10.6)
CHLORIDE SERPL-SCNC: 96 MMOL/L (ref 99–110)
CO2 SERPL-SCNC: 28 MMOL/L (ref 21–32)
CREAT SERPL-MCNC: 1 MG/DL (ref 0.6–1.2)
GFR SERPLBLD CREATININE-BSD FMLA CKD-EPI: >60 ML/MIN/{1.73_M2}
GLUCOSE SERPL-MCNC: 83 MG/DL (ref 70–99)
MAGNESIUM SERPL-MCNC: 2.5 MG/DL (ref 1.8–2.4)
PHOSPHATE SERPL-MCNC: 3.7 MG/DL (ref 2.5–4.9)
POTASSIUM SERPL-SCNC: 4.1 MMOL/L (ref 3.5–5.1)
SODIUM SERPL-SCNC: 134 MMOL/L (ref 136–145)

## 2023-07-14 PROCEDURE — 36415 COLL VENOUS BLD VENIPUNCTURE: CPT

## 2023-07-14 PROCEDURE — 83735 ASSAY OF MAGNESIUM: CPT

## 2023-07-14 PROCEDURE — 80069 RENAL FUNCTION PANEL: CPT

## 2023-08-16 ENCOUNTER — HOSPITAL ENCOUNTER (OUTPATIENT)
Age: 63
Discharge: HOME | End: 2023-08-16
Payer: MEDICARE

## 2023-08-16 PROCEDURE — 87536 HIV-1 QUANT&REVRSE TRNSCRPJ: CPT

## 2023-08-17 ENCOUNTER — PATIENT MESSAGE (OUTPATIENT)
Dept: PULMONOLOGY | Age: 63
End: 2023-08-17

## 2023-08-18 ENCOUNTER — TELEPHONE (OUTPATIENT)
Dept: PULMONOLOGY | Age: 63
End: 2023-08-18

## 2023-08-18 DIAGNOSIS — J44.9 CHRONIC OBSTRUCTIVE PULMONARY DISEASE, UNSPECIFIED COPD TYPE (HCC): ICD-10-CM

## 2023-08-18 DIAGNOSIS — J96.11 CHRONIC RESPIRATORY FAILURE WITH HYPOXIA (HCC): Primary | ICD-10-CM

## 2023-08-18 NOTE — TELEPHONE ENCOUNTER
From: Miquel Lowe  To: Dr. Alexey Daniel: 8/17/2023 9:28 PM EDT  Subject: Lul Sweet has been using more oxygen tanks. Could you please send an order for a portable concentrator? It is my understanding, that when Hillary Mane was in hospital a ( or rather, I think she was), said Medicare would approve for one if she continued using the oxygen tanks through June. It would be so much easier for outings with family and for her day center.   Thank you in advance for looking into this   Naidadajuan Villaseñor ( sister&guardian)

## 2023-08-19 LAB
HIV-1 QNT LOG, IU/ML: NOT DETECTED LOG CPY/ML
HIV-1 QNT, IU/ML: NOT DETECTED CPY/ML
INTERPRETATION: NOT DETECTED

## 2023-09-11 ENCOUNTER — TELEPHONE (OUTPATIENT)
Dept: PULMONOLOGY | Age: 63
End: 2023-09-11

## 2023-11-01 ENCOUNTER — OFFICE VISIT (OUTPATIENT)
Dept: PULMONOLOGY | Age: 63
End: 2023-11-01
Payer: MEDICARE

## 2023-11-01 VITALS
RESPIRATION RATE: 18 BRPM | BODY MASS INDEX: 26.62 KG/M2 | DIASTOLIC BLOOD PRESSURE: 69 MMHG | OXYGEN SATURATION: 98 % | HEART RATE: 64 BPM | HEIGHT: 61 IN | WEIGHT: 141 LBS | SYSTOLIC BLOOD PRESSURE: 111 MMHG

## 2023-11-01 DIAGNOSIS — F71 MODERATE INTELLECTUAL DISABILITY: ICD-10-CM

## 2023-11-01 DIAGNOSIS — J45.40 MODERATE PERSISTENT ASTHMA WITHOUT COMPLICATION: ICD-10-CM

## 2023-11-01 DIAGNOSIS — J96.11 CHRONIC RESPIRATORY FAILURE WITH HYPOXIA (HCC): Primary | ICD-10-CM

## 2023-11-01 DIAGNOSIS — E66.9 CLASS 1 OBESITY WITHOUT SERIOUS COMORBIDITY WITH BODY MASS INDEX (BMI) OF 30.0 TO 30.9 IN ADULT, UNSPECIFIED OBESITY TYPE: ICD-10-CM

## 2023-11-01 PROCEDURE — 1036F TOBACCO NON-USER: CPT | Performed by: INTERNAL MEDICINE

## 2023-11-01 PROCEDURE — 3078F DIAST BP <80 MM HG: CPT | Performed by: INTERNAL MEDICINE

## 2023-11-01 PROCEDURE — G8484 FLU IMMUNIZE NO ADMIN: HCPCS | Performed by: INTERNAL MEDICINE

## 2023-11-01 PROCEDURE — 3074F SYST BP LT 130 MM HG: CPT | Performed by: INTERNAL MEDICINE

## 2023-11-01 PROCEDURE — 3017F COLORECTAL CA SCREEN DOC REV: CPT | Performed by: INTERNAL MEDICINE

## 2023-11-01 PROCEDURE — G8417 CALC BMI ABV UP PARAM F/U: HCPCS | Performed by: INTERNAL MEDICINE

## 2023-11-01 PROCEDURE — G8427 DOCREV CUR MEDS BY ELIG CLIN: HCPCS | Performed by: INTERNAL MEDICINE

## 2023-11-01 PROCEDURE — 99214 OFFICE O/P EST MOD 30 MIN: CPT | Performed by: INTERNAL MEDICINE

## 2023-11-01 RX ORDER — TORSEMIDE 20 MG/1
TABLET ORAL
COMMUNITY
Start: 2023-10-20

## 2023-11-01 RX ORDER — SPIRONOLACTONE 25 MG/1
TABLET ORAL
COMMUNITY
Start: 2023-10-18

## 2023-11-01 RX ORDER — EMPAGLIFLOZIN 25 MG/1
TABLET, FILM COATED ORAL
COMMUNITY
Start: 2023-10-18

## 2023-11-01 RX ORDER — ONDANSETRON 4 MG/1
TABLET, ORALLY DISINTEGRATING ORAL
COMMUNITY
Start: 2023-06-28

## 2023-11-01 RX ORDER — AMMONIUM LACTATE 12 G/100G
LOTION TOPICAL
COMMUNITY
Start: 2023-08-22

## 2023-11-01 ASSESSMENT — ENCOUNTER SYMPTOMS
RESPIRATORY NEGATIVE: 1
EYES NEGATIVE: 1
GASTROINTESTINAL NEGATIVE: 1
ALLERGIC/IMMUNOLOGIC NEGATIVE: 1

## 2023-11-01 NOTE — PROGRESS NOTES
Shant Marks (:  1960) is a 61 y.o. female,Established patient, here for evaluation of the following chief complaint(s):  Follow-up (6 month) and Asthma         ASSESSMENT/PLAN:  1. Chronic respiratory failure with hypoxia (HCC)  2. Moderate intellectual disability  3. Moderate persistent asthma without complication  4. Class 1 obesity without serious comorbidity with body mass index (BMI) of 30.0 to 30.9 in adult, unspecified obesity type       Asthma  Continue with:  symbicort 160/4.5 1 puff twice a day, spacer  singulair (montelukast) once a day  Rescue albuterol (ventolin) as needed  Using spacer       May have issues with symbicort and the throat, had same problem with advair    Need to continue walk and exercise       Obesity  Weight was 162 and then 157 and then up to 167 then at 145 and then at 161 and then at 162 and then at 156 to 154 to 151 to 141        LYNNETTE  Sleep study showed ahi of 55, this was redone 2016  Last study showed ahi of 33  bipap titration done 10/2017  Showed bipap of      dme is [employer]    autobipap with ipap max of 20 and epap min of 13 and ps of 4  I don't have access via [employer]     Seen by Dr. Reece Aguayo for [place] but still not a candidate for inspire      Stopped using auto BiPAP because the patient would keep pulling off the machine at night and not using it for through the night  It was becoming a struggle to try to keep her compliant    Changed her to oxygen only at night  Continue with oxygen 3 L/min at night          Was hospitalized 2017 with dx of Acute CHF  Would suggest taking Lasix (furosemide) in the day, like noon rather than at night     Was hospitalized at [facility] in 2020  She has recovered  Last cxr was done 2020    cxr done 2021     Impression   Bilateral linear opacities likely representing areas of   scarring/postinflammatory fibrosis.                Chronic  resp failure, hypoxemia    Hypoxemia and shortness of

## 2023-11-01 NOTE — PROGRESS NOTES
MA Communication:   The following orders are received by verbal communication from Klarissa Poole MD    Orders include:  6 month f/u, work on getting POC

## 2023-11-01 NOTE — PATIENT INSTRUCTIONS
ASSESSMENT/PLAN:  1. Chronic respiratory failure with hypoxia (HCC)  2. Moderate intellectual disability  3. Moderate persistent asthma without complication  4. Class 1 obesity without serious comorbidity with body mass index (BMI) of 30.0 to 30.9 in adult, unspecified obesity type       Asthma  Continue with:  symbicort 160/4.5 1 puff twice a day, spacer  singulair (montelukast) once a day  Rescue albuterol (ventolin) as needed  Using spacer       May have issues with symbicort and the throat, had same problem with advair    Need to continue walk and exercise       Obesity  Weight was 162 and then 157 and then up to 167 then at 145 and then at 161 and then at 162 and then at 156 to 154 to 151 to 141        LYNNETTE  Sleep study showed ahi of 55, this was redone 7/2016  Last study showed ahi of 33  bipap titration done 10/2017  Showed bipap of 11/7     dme is [employer]    autobipap with ipap max of 20 and epap min of 13 and ps of 4  I don't have access via [employer]     Seen by Dr. Branden Yi for [place] but still not a candidate for inspire      Stopped using auto BiPAP because the patient would keep pulling off the machine at night and not using it for through the night  It was becoming a struggle to try to keep her compliant    Changed her to oxygen only at night  Continue with oxygen 3 L/min at night          Was hospitalized 4/2017 with dx of Acute CHF  Would suggest taking Lasix (furosemide) in the day, like noon rather than at night     Was hospitalized at [facility] in December 2020  She has recovered  Last cxr was done 12/2020    cxr done 7/7/2021     Impression   Bilateral linear opacities likely representing areas of   scarring/postinflammatory fibrosis. Chronic  resp failure, hypoxemia    Hypoxemia and shortness of breath starting around 6/27/2022  CTPA done 6/30/2022  Impression   No pulmonary embolus is identified.        Parenchymal pattern suggestive of air trapping and

## 2023-11-27 ENCOUNTER — HOSPITAL ENCOUNTER (OUTPATIENT)
Dept: MAMMOGRAPHY | Age: 63
Discharge: HOME | End: 2023-11-27
Payer: MEDICARE

## 2023-11-27 DIAGNOSIS — Z12.39 SCREENING BREAST EXAMINATION: ICD-10-CM

## 2023-11-27 PROCEDURE — 77063 BREAST TOMOSYNTHESIS BI: CPT

## 2024-02-27 DIAGNOSIS — J45.40 MODERATE PERSISTENT ASTHMA WITHOUT COMPLICATION: Primary | ICD-10-CM

## 2024-02-27 RX ORDER — BUDESONIDE AND FORMOTEROL FUMARATE DIHYDRATE 160; 4.5 UG/1; UG/1
2 AEROSOL RESPIRATORY (INHALATION) 2 TIMES DAILY
Qty: 10.2 G | Refills: 5 | Status: SHIPPED | OUTPATIENT
Start: 2024-02-27

## 2024-02-27 RX ORDER — BUDESONIDE AND FORMOTEROL FUMARATE DIHYDRATE 160; 4.5 UG/1; UG/1
AEROSOL RESPIRATORY (INHALATION)
Qty: 11 G | Refills: 0 | OUTPATIENT
Start: 2024-02-27

## 2024-02-27 RX ORDER — BUDESONIDE AND FORMOTEROL FUMARATE DIHYDRATE 160; 4.5 UG/1; UG/1
2 AEROSOL RESPIRATORY (INHALATION) 2 TIMES DAILY
Qty: 10.2 G | Refills: 4 | OUTPATIENT
Start: 2024-02-27

## 2024-02-27 NOTE — TELEPHONE ENCOUNTER
Last office visit 11/1/2023     Last written 8/16/22     Next office visit scheduled 5/1/2024    Requested Prescriptions     Pending Prescriptions Disp Refills    SYMBICORT 160-4.5 MCG/ACT AERO [Pharmacy Med Name: SYMBICORT -4.5] 11 g 0     Sig: INHALE 1 PUFF INTO THE LUNGS IN THE MORNING AND 1 PUFF BEFORE BEDTIME. USE WITH SPACER. RINSE MOUTH OUT AFTER USE TO PREVENT HOARSENESS AND THRUSH..

## 2024-02-27 NOTE — TELEPHONE ENCOUNTER
Family will need to call insurance and see if Dulera is covered under plan or something else.  She has tried Advair in the past and can't take powdered inhalers due to throat irritation.

## 2024-02-27 NOTE — TELEPHONE ENCOUNTER
I have sent both brand and generic Symbicort to see which is covered. She is to use with spacer. Rinse mouth out after use to prevent hoarseness and thrush.    Can't take powdered inhalers due to throat irritation.

## 2024-02-28 NOTE — TELEPHONE ENCOUNTER
Gin from [facility] in [place] called back to ask why another symbicort script had been sent when neither generic or brand will be covered by pt's ins, even with the additional comment that substitution is not allowed by provider. I relayed that we were waiting on pt to call her insurance to see if Dulera will be covered. Gin said she does not think pt is capable of calling insurance since she is in a group home. Gin will put in a test claim for Dulera and call back to let us know whether it will be covered by ins.

## 2024-02-29 NOTE — TELEPHONE ENCOUNTER
Dulera sent to pharmacy. 2 puffs twice a day Rinse mouth out after use to prevent hoarseness and thrush. Please let family ( group home) know.  This should be used with a spacer that she already has also.

## 2024-03-13 ENCOUNTER — TELEPHONE (OUTPATIENT)
Dept: PULMONOLOGY | Age: 64
End: 2024-03-13

## 2024-03-14 NOTE — TELEPHONE ENCOUNTER
Submitted PA for DULERA  Via [facility] Key: [id number]  STATUS: PENDING.    Follow up done daily; if no decision with in three days we will refax.  If another three days goes by with no decision will call the insurance for status.

## 2024-04-10 RX ORDER — MONTELUKAST SODIUM 10 MG/1
TABLET ORAL
Qty: 31 TABLET | Refills: 9 | Status: SHIPPED | OUTPATIENT
Start: 2024-04-10

## 2024-05-01 ENCOUNTER — OFFICE VISIT (OUTPATIENT)
Dept: PULMONOLOGY | Age: 64
End: 2024-05-01
Payer: MEDICARE

## 2024-05-01 VITALS
HEART RATE: 50 BPM | RESPIRATION RATE: 16 BRPM | BODY MASS INDEX: 28.4 KG/M2 | SYSTOLIC BLOOD PRESSURE: 116 MMHG | OXYGEN SATURATION: 95 % | HEIGHT: 61 IN | DIASTOLIC BLOOD PRESSURE: 65 MMHG | WEIGHT: 150.4 LBS | TEMPERATURE: 98.7 F

## 2024-05-01 DIAGNOSIS — J96.11 CHRONIC RESPIRATORY FAILURE WITH HYPOXIA (HCC): ICD-10-CM

## 2024-05-01 DIAGNOSIS — I50.42 CHRONIC COMBINED SYSTOLIC AND DIASTOLIC HEART FAILURE (HCC): ICD-10-CM

## 2024-05-01 DIAGNOSIS — G47.33 OBSTRUCTIVE SLEEP APNEA SYNDROME: ICD-10-CM

## 2024-05-01 DIAGNOSIS — J45.40 MODERATE PERSISTENT ASTHMA WITHOUT COMPLICATION: Primary | ICD-10-CM

## 2024-05-01 DIAGNOSIS — R62.50 DEVELOPMENTAL DELAY: ICD-10-CM

## 2024-05-01 PROBLEM — I50.22 CHRONIC SYSTOLIC HEART FAILURE (HCC): Status: ACTIVE | Noted: 2023-05-18

## 2024-05-01 PROCEDURE — 3078F DIAST BP <80 MM HG: CPT | Performed by: NURSE PRACTITIONER

## 2024-05-01 PROCEDURE — G8427 DOCREV CUR MEDS BY ELIG CLIN: HCPCS | Performed by: NURSE PRACTITIONER

## 2024-05-01 PROCEDURE — 1036F TOBACCO NON-USER: CPT | Performed by: NURSE PRACTITIONER

## 2024-05-01 PROCEDURE — G8417 CALC BMI ABV UP PARAM F/U: HCPCS | Performed by: NURSE PRACTITIONER

## 2024-05-01 PROCEDURE — 3074F SYST BP LT 130 MM HG: CPT | Performed by: NURSE PRACTITIONER

## 2024-05-01 PROCEDURE — 99214 OFFICE O/P EST MOD 30 MIN: CPT | Performed by: NURSE PRACTITIONER

## 2024-05-01 PROCEDURE — 3017F COLORECTAL CA SCREEN DOC REV: CPT | Performed by: NURSE PRACTITIONER

## 2024-05-01 RX ORDER — LORATADINE 10 MG/1
TABLET ORAL
COMMUNITY
Start: 2024-04-11

## 2024-05-01 RX ORDER — METOPROLOL SUCCINATE 50 MG/1
TABLET, EXTENDED RELEASE ORAL
COMMUNITY
Start: 2024-04-26

## 2024-05-01 RX ORDER — LANOLIN ALCOHOL/MO/W.PET/CERES
CREAM (GRAM) TOPICAL
COMMUNITY
Start: 2024-04-11

## 2024-05-01 ASSESSMENT — ENCOUNTER SYMPTOMS
SORE THROAT: 1
SHORTNESS OF BREATH: 1
COUGH: 0
EYE PAIN: 0
WHEEZING: 0
RHINORRHEA: 0
ABDOMINAL PAIN: 0
CHEST TIGHTNESS: 0

## 2024-05-01 NOTE — PROGRESS NOTES
Jess Duffy is a 64 y.o. who comes in today for Follow-up (Patient is here for pulmonary follow up. )   She continues Dulera with spacer, Singulair, Claritin,  and using albuterol sporadically. She does have known severe LYNNETTE however she has not been able to tolerate CPAP and now just on oxygen therapy.  She is on continuous oxygen therapy at 2 L nc.  She is not an Inspire candidate.  States her breathing  is doing well. She is here with her caregiver.   Caregiver states she is only using albuterol sporadically however spacer is not working correctly for her Dulera.  She continues daily weights. Not having weight changes.She states she does have some foot swelling at times.             Past Medical History:   Diagnosis Date    Asthma     Blind left eye     Cardiomyopathy (HCC)     CHF (congestive heart failure) (HCC)     COVID-19 12/20/2020    Depression     Diabetes     Dizziness and giddiness     GALLAGHER (dyspnea on exertion)     Fractures     Hyperthyroidism     Hypoxemia     Mental retardation     Mitral valve disorder     Mixed hyperlipidemia     Profound impairment, one eye, impairment level not further specified     Sleep apnea     Toxic uninodular goiter     Vitamin D deficiency         Past Surgical History:   Procedure Laterality Date    CHOLECYSTECTOMY      COLONOSCOPY      DIAGNOSTIC CARDIAC CATH LAB PROCEDURE      HYSTERECTOMY (CERVIX STATUS UNKNOWN)          Family History   Problem Relation Age of Onset    Ovarian Cancer Mother     Lung Cancer Father     Breast Cancer Sister     Cervical Cancer Sister         Allergies   Allergen Reactions    Penicillins        Current Outpatient Medications   Medication Sig Dispense Refill    loratadine (CLARITIN) 10 MG tablet       magnesium oxide (MAG-OX) 400 (240 Mg) MG tablet       metoprolol succinate (TOPROL XL) 50 MG extended release tablet       Spacer/Aero-Holding Chambers ABRAHAM 1 Device by Does not apply route daily as needed (shortness of breath) 1 each 0

## 2024-05-01 NOTE — PATIENT INSTRUCTIONS
Call with worsening symptoms such as increased shortness of breath, productive cough, wheezing or symptoms not responding to treatment plan.     Your current pulmonary medications are controlling/treating your Asthma .  Stay compliant.  Reviewed present pulmonary medications and side effects.  Reviewed proper inhaler usage.    Return to clinic in 6 months with Dr. Alvarado  Remember to bring a list of pulmonary medications and any CPAP or BiPAP machines to your next appointment with the office.     Please keep all of your future appointments scheduled by [facility] [place] [facility] office. Out of respect for other patients and providers, you may be asked to reschedule your appointment if you arrive later than your scheduled appointment time. Appointments cancelled less than 24hrs in advance will be considered a no show. Patients with three missed appointments within 1 year or four missed appointments within 2 years can be dismissed from the practice.     Please be aware that our physicians are required to work in the Intensive Care Unit at [facility].  Your appointment may need to be rescheduled if they are designated to work during your appointment time.      You may receive a survey regarding the care you received during your visit.  Your input is valuable to us.  We encourage you to complete and return your survey.  We hope you will choose us in the future for your healthcare needs.     Pt instructed of all future appointment dates & times, including radiology, labs, procedures & referrals. If procedures were scheduled preparation instructions provided. Instructions on future appointments with [facility]monary were given.      In the next few weeks, you will be receiving a survey from [facility] regarding your visit today.  We would greatly appreciate it if you would take just a few minutes to fill that out.  It is very important to us that our patients receive top notch care

## 2024-05-01 NOTE — PROGRESS NOTES
MA Communication:  The following orders are received by verbal communication from Mariel Neal CNP    Orders include:  6 mon with Jenny

## 2024-05-03 ENCOUNTER — TELEPHONE (OUTPATIENT)
Dept: PULMONOLOGY | Age: 64
End: 2024-05-03

## 2024-05-03 NOTE — TELEPHONE ENCOUNTER
I have spoken with patient's caregiver and our rep at [facility], Freddy, about being able to provide the patient with a rollator walker, a POC, and/or a carrying case for her current tanks. I'm expecting Freddy to call on Monday to see what she qualifies for and what is required from us to get her what she needs.

## 2024-06-12 ENCOUNTER — PATIENT MESSAGE (OUTPATIENT)
Dept: PULMONOLOGY | Age: 64
End: 2024-06-12

## 2024-06-12 DIAGNOSIS — J96.11 CHRONIC RESPIRATORY FAILURE WITH HYPOXIA (HCC): Primary | ICD-10-CM

## 2024-06-13 NOTE — TELEPHONE ENCOUNTER
Order faxed to [facility] via [employer] to [phone].     Notified pt via "[employer]

## 2024-06-18 ENCOUNTER — TELEPHONE (OUTPATIENT)
Dept: PULMONOLOGY | Age: 64
End: 2024-06-18

## 2024-06-18 DIAGNOSIS — J96.11 CHRONIC RESPIRATORY FAILURE WITH HYPOXIA (HCC): Primary | ICD-10-CM

## 2024-06-18 DIAGNOSIS — J45.40 MODERATE PERSISTENT ASTHMA WITHOUT COMPLICATION: ICD-10-CM

## 2024-06-18 NOTE — TELEPHONE ENCOUNTER
Álvaro called stating Jses will need a 6 minute walk test. Order placed. Please let Jess's caregivers know and schedule.

## 2024-06-19 NOTE — TELEPHONE ENCOUNTER
Detail Level: Generalized I called and s/w Gin at Group Home.  She scheduled 6MW @ [facility] on 7/22/24 @ 2 PM.   Detail Level: Zone Detail Level: Simple Detail Level: Detailed

## 2024-07-22 ENCOUNTER — HOSPITAL ENCOUNTER (OUTPATIENT)
Dept: PULMONOLOGY | Age: 64
Discharge: HOME | End: 2024-07-22
Payer: MEDICARE

## 2024-07-22 DIAGNOSIS — J45.40 MODERATE PERSISTENT ASTHMA WITHOUT COMPLICATION: ICD-10-CM

## 2024-07-22 DIAGNOSIS — J96.11 CHRONIC RESPIRATORY FAILURE WITH HYPOXIA (HCC): ICD-10-CM

## 2024-07-22 PROCEDURE — 94618 PULMONARY STRESS TESTING: CPT

## 2024-07-22 NOTE — PROGRESS NOTES
[facility] Pulmonary Function Lab - Six Minute Walk    Test Performed on:   Room Air__X____   Oxygen at _____ lpm via N/C- continuous Oxygen at _____ lpm via N/C- OCD  Assist Device Used During Test:  None__X____ Cane________ Walker_________    Modified Robetr's Scale  0 Nothing at all 5 Strong    0.5 Just noticeable 6 Stronger (Hard)    1 Very Light 7 Very Strong   2 Light 8 Very Very Strong   3 Moderate 9 Extremely Strong   4 Somewhat Strong 10 Maximum All out      Time SpO2 Heart Rate Respiratory Rate Dyspnea-  Modified Robert’s Scale Fatigue- Modified Robert’s Scale Other Symptoms   Baseline                       97% RA 64 18 0       1 minute                     95% RA 69 18 3     2 minutes                       94% RA 83 22 4       3 minutes                     92% RA 87 24 5     4 minutes                     92% RA 91 24 5     5 minutes                     91% RA 95 26 7     6 minutes                     88% RA 99 26 10     Recovery x 1 minute                     93% RA 92 22 5     Recovery x 2 Minute                     96% RA 88 18 0      Number of Laps_____8____ X 120 feet + _________ additional feet = Total Distance __960_____feet     Total expected 6 MW distance is _____1536__feet. Patient achieved ____61__% of expected distance.   Pre Blood Pressure:  Post Blood Pressure: 117/72  Other symptoms at the end of exercise: Spo2 dropped to 88% on room air art the end of 6 minutes walking. Spo2 improved to 90% on room air within seconds of sitting.

## 2024-07-23 PROCEDURE — 94618 PULMONARY STRESS TESTING: CPT | Performed by: INTERNAL MEDICINE

## 2024-07-23 NOTE — PROCEDURES
[address] [address]                           PULMONARY FUNCTION      PATIENT NAME: MAR JI               : 1960  MED REC NO: [id number]                      ROOM:   ACCOUNT NO: [id number]                       ADMIT DATE: 2024  PROVIDER: Mason Durham MD      DATE OF PROCEDURE: 2024    STUDY:  6-minute walk test interpretation for chronic respiratory failure.    The patient's baseline oxygen saturation was 97% on room air at rest with a heart rate of 64, respiratory rate of 18, dyspnea modified Robert scale of 0.  The patient had some drop in oxygen saturation to as low as 88%, but not below that.  On this PFT, the patient's total distance walked was 950 feet.  The patient achieved 51% of the expected distance, which was 1568 feet.  The patient on the basis of the 6-minute walk test had some exertional hypoxemia, but not to the point that the patient is qualifying for supplemental oxygen.  Please correlate clinically.          MASON DURHAM MD      D:  2024 12:14:28     T:  2024 13:51:25     SK/KATHARINE  Job #:  [id number]     Doc#:  [id number]

## 2024-11-29 ENCOUNTER — HOSPITAL ENCOUNTER (OUTPATIENT)
Dept: MAMMOGRAPHY | Age: 64
Discharge: HOME | End: 2024-11-29
Payer: MEDICARE

## 2024-11-29 VITALS — BODY MASS INDEX: 31.91 KG/M2 | HEIGHT: 58 IN | WEIGHT: 152 LBS

## 2024-11-29 DIAGNOSIS — Z12.31 OTHER SCREENING MAMMOGRAM: ICD-10-CM

## 2024-11-29 PROCEDURE — 77063 BREAST TOMOSYNTHESIS BI: CPT

## 2024-12-03 ENCOUNTER — OFFICE VISIT (OUTPATIENT)
Dept: PULMONOLOGY | Age: 64
End: 2024-12-03
Payer: MEDICARE

## 2024-12-03 VITALS
RESPIRATION RATE: 16 BRPM | WEIGHT: 152 LBS | SYSTOLIC BLOOD PRESSURE: 107 MMHG | DIASTOLIC BLOOD PRESSURE: 71 MMHG | TEMPERATURE: 98.7 F | HEIGHT: 59 IN | HEART RATE: 68 BPM | BODY MASS INDEX: 30.64 KG/M2 | OXYGEN SATURATION: 91 %

## 2024-12-03 DIAGNOSIS — J96.11 CHRONIC RESPIRATORY FAILURE WITH HYPOXIA: ICD-10-CM

## 2024-12-03 DIAGNOSIS — G47.33 OBSTRUCTIVE SLEEP APNEA SYNDROME: ICD-10-CM

## 2024-12-03 DIAGNOSIS — J45.40 MODERATE PERSISTENT ASTHMA WITHOUT COMPLICATION: Primary | ICD-10-CM

## 2024-12-03 PROCEDURE — G8417 CALC BMI ABV UP PARAM F/U: HCPCS | Performed by: STUDENT IN AN ORGANIZED HEALTH CARE EDUCATION/TRAINING PROGRAM

## 2024-12-03 PROCEDURE — G8484 FLU IMMUNIZE NO ADMIN: HCPCS | Performed by: STUDENT IN AN ORGANIZED HEALTH CARE EDUCATION/TRAINING PROGRAM

## 2024-12-03 PROCEDURE — 1036F TOBACCO NON-USER: CPT | Performed by: STUDENT IN AN ORGANIZED HEALTH CARE EDUCATION/TRAINING PROGRAM

## 2024-12-03 PROCEDURE — 3074F SYST BP LT 130 MM HG: CPT | Performed by: STUDENT IN AN ORGANIZED HEALTH CARE EDUCATION/TRAINING PROGRAM

## 2024-12-03 PROCEDURE — 99214 OFFICE O/P EST MOD 30 MIN: CPT | Performed by: STUDENT IN AN ORGANIZED HEALTH CARE EDUCATION/TRAINING PROGRAM

## 2024-12-03 PROCEDURE — G8428 CUR MEDS NOT DOCUMENT: HCPCS | Performed by: STUDENT IN AN ORGANIZED HEALTH CARE EDUCATION/TRAINING PROGRAM

## 2024-12-03 PROCEDURE — 3078F DIAST BP <80 MM HG: CPT | Performed by: STUDENT IN AN ORGANIZED HEALTH CARE EDUCATION/TRAINING PROGRAM

## 2024-12-03 PROCEDURE — 3017F COLORECTAL CA SCREEN DOC REV: CPT | Performed by: STUDENT IN AN ORGANIZED HEALTH CARE EDUCATION/TRAINING PROGRAM

## 2024-12-03 RX ORDER — ALBUTEROL SULFATE 90 UG/1
1 INHALANT RESPIRATORY (INHALATION) EVERY 4 HOURS PRN
Qty: 18 G | Refills: 10 | Status: SHIPPED | OUTPATIENT
Start: 2024-12-03

## 2024-12-03 RX ORDER — GUAIFENESIN 600 MG/1
1200 TABLET, EXTENDED RELEASE ORAL 2 TIMES DAILY
COMMUNITY

## 2024-12-03 ASSESSMENT — ENCOUNTER SYMPTOMS
DIARRHEA: 0
NAUSEA: 0
STRIDOR: 0
EYE ITCHING: 0
SHORTNESS OF BREATH: 0
TROUBLE SWALLOWING: 0
COLOR CHANGE: 0
COUGH: 0
WHEEZING: 0
EYE DISCHARGE: 0
VOMITING: 0
EYE REDNESS: 0
BACK PAIN: 0
SORE THROAT: 0
CONSTIPATION: 0
EYE PAIN: 0
ABDOMINAL DISTENTION: 0
ABDOMINAL PAIN: 0

## 2024-12-03 NOTE — PATIENT INSTRUCTIONS
Remember to bring a list of pulmonary medications and any CPAP or BiPAP machines to your next appointment with the office.     Please keep all of your future appointments scheduled by [facility] office. Out of respect for other patients and providers, you may be asked to reschedule your appointment if you arrive later than your scheduled appointment time. Appointments cancelled less than 24hrs in advance will be considered a no show. Patients with three missed appointments within 1 year or four missed appointments within 2 years can be dismissed from the practice.     Please be aware that our physicians are required to work in the Intensive Care Unit at [facility].  Your appointment may need to be rescheduled if they are designated to work during your appointment time.      You may receive a survey regarding the care you received during your visit.  Your input is valuable to us.  We encourage you to complete and return your survey.  We hope you will choose us in the future for your healthcare needs.     Pt instructed of all future appointment dates & times, including radiology, labs, procedures & referrals. If procedures were scheduled preparation instructions provided. Instructions on future appointments with [facility] were given.

## 2024-12-03 NOTE — PROGRESS NOTES
[facility] Follow-up  [address], [place] [id number] [phone]        Jess Duffy (: 1960 ) is a 64 y.o. female here for an evaluation of   Chief Complaint   Patient presents with    Follow-up     6 mo    Asthma         SUBJECTIVE/OBJECTIVE:  Patient is 64-year-old female with significant past medical history of asthma that presents to [facility] for follow-up visit.  Patient has no complaints.  She denies any fever, chills, chest pain, cough, nausea, vomit, abdominal pain.  She is asking for refills of medications.  She has not needed to go to the hospital for breathing issues.      Review of Systems   Constitutional:  Negative for activity change, appetite change, chills, diaphoresis and fatigue.   HENT:  Negative for congestion, sore throat and trouble swallowing.    Eyes:  Negative for pain, discharge, redness and itching.   Respiratory:  Negative for cough, shortness of breath, wheezing and stridor.    Cardiovascular:  Negative for chest pain, palpitations and leg swelling.   Gastrointestinal:  Negative for abdominal distention, abdominal pain, constipation, diarrhea, nausea and vomiting.   Endocrine: Negative for polydipsia, polyphagia and polyuria.   Genitourinary:  Negative for difficulty urinating.   Musculoskeletal:  Negative for back pain, myalgias and neck pain.   Skin:  Negative for color change.   Neurological:  Negative for dizziness, weakness and light-headedness.   Psychiatric/Behavioral:  Negative for agitation and behavioral problems.          Vitals:    24 1005   BP: 107/71   Pulse: 68   Resp: 16   Temp: 98.7 °F (37.1 °C)   TempSrc: Temporal   SpO2: 91%   Weight: 68.9 kg (152 lb)   Height: 1.499 m (4' 11\")        Physical Exam  Constitutional:       General: She is not in acute distress.     Appearance: She is not toxic-appearing.   HENT:      Head: Normocephalic and atraumatic.      Nose: Nose normal.      Mouth/Throat:      Pharynx: No oropharyngeal

## 2024-12-03 NOTE — PROGRESS NOTES
MA Communication:  The following orders are received by verbal communication from Sj Alvarado MD    Orders include:    1 yr lungs  Pt requesting [employer]    Order faxed 12/3/24

## 2025-02-18 RX ORDER — MONTELUKAST SODIUM 10 MG/1
TABLET ORAL
Qty: 31 TABLET | Refills: 8 | Status: SHIPPED | OUTPATIENT
Start: 2025-02-18

## 2025-04-07 ENCOUNTER — HOSPITAL ENCOUNTER (OUTPATIENT)
Age: 65
Discharge: HOME | End: 2025-04-07
Payer: MEDICARE

## 2025-04-07 LAB
BASOPHILS # BLD: 0 K/UL (ref 0–0.2)
BASOPHILS NFR BLD: 0.5 %
DEPRECATED RDW RBC AUTO: 14.1 % (ref 12.4–15.4)
EOSINOPHIL # BLD: 0.1 K/UL (ref 0–0.6)
EOSINOPHIL NFR BLD: 2.1 %
HCT VFR BLD AUTO: 39 % (ref 36–48)
HGB BLD-MCNC: 13 G/DL (ref 12–16)
LYMPHOCYTES # BLD: 1.5 K/UL (ref 1–5.1)
LYMPHOCYTES NFR BLD: 21 %
MCH RBC QN AUTO: 32.5 PG (ref 26–34)
MCHC RBC AUTO-ENTMCNC: 33.3 G/DL (ref 31–36)
MCV RBC AUTO: 97.4 FL (ref 80–100)
MONOCYTES # BLD: 0.5 K/UL (ref 0–1.3)
MONOCYTES NFR BLD: 6.7 %
NEUTROPHILS # BLD: 4.9 K/UL (ref 1.7–7.7)
NEUTROPHILS NFR BLD: 69.7 %
PLATELET # BLD AUTO: 192 K/UL (ref 135–450)
PMV BLD AUTO: 8.4 FL (ref 5–10.5)
RBC # BLD AUTO: 4 M/UL (ref 4–5.2)
WBC # BLD AUTO: 7 K/UL (ref 4–11)

## 2025-04-07 PROCEDURE — 85025 COMPLETE CBC W/AUTO DIFF WBC: CPT

## 2025-04-07 PROCEDURE — 36415 COLL VENOUS BLD VENIPUNCTURE: CPT

## 2025-07-14 ENCOUNTER — PATIENT MESSAGE (OUTPATIENT)
Dept: PULMONOLOGY | Age: 65
End: 2025-07-14